# Patient Record
Sex: MALE | Race: WHITE | Employment: FULL TIME | URBAN - METROPOLITAN AREA
[De-identification: names, ages, dates, MRNs, and addresses within clinical notes are randomized per-mention and may not be internally consistent; named-entity substitution may affect disease eponyms.]

---

## 2017-01-03 ENCOUNTER — OFFICE VISIT (OUTPATIENT)
Dept: INTERNAL MEDICINE CLINIC | Age: 52
End: 2017-01-03

## 2017-01-03 VITALS
WEIGHT: 246 LBS | BODY MASS INDEX: 29.05 KG/M2 | HEIGHT: 77 IN | SYSTOLIC BLOOD PRESSURE: 130 MMHG | HEART RATE: 125 BPM | DIASTOLIC BLOOD PRESSURE: 90 MMHG | OXYGEN SATURATION: 97 %

## 2017-01-03 DIAGNOSIS — R07.9 CHEST PAIN, UNSPECIFIED TYPE: Primary | ICD-10-CM

## 2017-01-03 DIAGNOSIS — F51.01 PRIMARY INSOMNIA: ICD-10-CM

## 2017-01-03 PROCEDURE — 99213 OFFICE O/P EST LOW 20 MIN: CPT | Performed by: INTERNAL MEDICINE

## 2017-01-03 ASSESSMENT — ENCOUNTER SYMPTOMS
SHORTNESS OF BREATH: 1
COUGH: 1

## 2017-01-13 ENCOUNTER — OFFICE VISIT (OUTPATIENT)
Dept: INTERNAL MEDICINE CLINIC | Age: 52
End: 2017-01-13

## 2017-01-13 VITALS
BODY MASS INDEX: 28.69 KG/M2 | HEIGHT: 77 IN | OXYGEN SATURATION: 98 % | DIASTOLIC BLOOD PRESSURE: 80 MMHG | SYSTOLIC BLOOD PRESSURE: 120 MMHG | WEIGHT: 243 LBS | HEART RATE: 92 BPM

## 2017-01-13 DIAGNOSIS — I26.90 ACUTE SEPTIC PULMONARY EMBOLISM WITHOUT ACUTE COR PULMONALE (HCC): Primary | ICD-10-CM

## 2017-01-13 DIAGNOSIS — Z12.5 SCREENING PSA (PROSTATE SPECIFIC ANTIGEN): ICD-10-CM

## 2017-01-13 DIAGNOSIS — J18.9 PNEUMONIA DUE TO INFECTIOUS ORGANISM, UNSPECIFIED LATERALITY, UNSPECIFIED PART OF LUNG: ICD-10-CM

## 2017-01-13 DIAGNOSIS — F51.01 PRIMARY INSOMNIA: ICD-10-CM

## 2017-01-13 DIAGNOSIS — R91.1 PULMONARY NODULE: ICD-10-CM

## 2017-01-13 LAB
BASOPHILS ABSOLUTE: 0.1 K/UL (ref 0–0.2)
BASOPHILS RELATIVE PERCENT: 0.8 %
EOSINOPHILS ABSOLUTE: 0.5 K/UL (ref 0–0.6)
EOSINOPHILS RELATIVE PERCENT: 4.3 %
HCT VFR BLD CALC: 45.3 % (ref 40.5–52.5)
HEMOGLOBIN: 15.4 G/DL (ref 13.5–17.5)
LYMPHOCYTES ABSOLUTE: 2.1 K/UL (ref 1–5.1)
LYMPHOCYTES RELATIVE PERCENT: 19.6 %
MCH RBC QN AUTO: 29.6 PG (ref 26–34)
MCHC RBC AUTO-ENTMCNC: 34 G/DL (ref 31–36)
MCV RBC AUTO: 87.1 FL (ref 80–100)
MONOCYTES ABSOLUTE: 0.5 K/UL (ref 0–1.3)
MONOCYTES RELATIVE PERCENT: 4.8 %
NEUTROPHILS ABSOLUTE: 7.7 K/UL (ref 1.7–7.7)
NEUTROPHILS RELATIVE PERCENT: 70.5 %
PDW BLD-RTO: 12.6 % (ref 12.4–15.4)
PLATELET # BLD: 403 K/UL (ref 135–450)
PMV BLD AUTO: 7.9 FL (ref 5–10.5)
PROSTATE SPECIFIC ANTIGEN: 0.46 NG/ML (ref 0–4)
RBC # BLD: 5.2 M/UL (ref 4.2–5.9)
WBC # BLD: 10.9 K/UL (ref 4–11)

## 2017-01-13 PROCEDURE — 99214 OFFICE O/P EST MOD 30 MIN: CPT | Performed by: INTERNAL MEDICINE

## 2017-01-13 ASSESSMENT — ENCOUNTER SYMPTOMS
ALLERGIC/IMMUNOLOGIC NEGATIVE: 1
RESPIRATORY NEGATIVE: 1
EYES NEGATIVE: 1

## 2017-01-16 ENCOUNTER — TELEPHONE (OUTPATIENT)
Dept: INTERNAL MEDICINE CLINIC | Age: 52
End: 2017-01-16

## 2017-01-16 DIAGNOSIS — I26.90 ACUTE SEPTIC PULMONARY EMBOLISM WITHOUT ACUTE COR PULMONALE (HCC): Primary | ICD-10-CM

## 2017-01-30 ENCOUNTER — TELEPHONE (OUTPATIENT)
Dept: INTERNAL MEDICINE CLINIC | Age: 52
End: 2017-01-30

## 2017-01-30 DIAGNOSIS — I26.90 ACUTE SEPTIC PULMONARY EMBOLISM WITHOUT ACUTE COR PULMONALE (HCC): Primary | ICD-10-CM

## 2017-01-30 DIAGNOSIS — R93.89 ABNORMAL CXR (CHEST X-RAY): Primary | ICD-10-CM

## 2017-01-31 ENCOUNTER — TELEPHONE (OUTPATIENT)
Dept: PULMONOLOGY | Age: 52
End: 2017-01-31

## 2017-02-07 ENCOUNTER — TELEPHONE (OUTPATIENT)
Dept: INTERNAL MEDICINE CLINIC | Age: 52
End: 2017-02-07

## 2017-02-10 ENCOUNTER — TELEPHONE (OUTPATIENT)
Dept: INTERNAL MEDICINE CLINIC | Age: 52
End: 2017-02-10

## 2017-02-14 ENCOUNTER — OFFICE VISIT (OUTPATIENT)
Dept: PULMONOLOGY | Age: 52
End: 2017-02-14

## 2017-02-14 VITALS
BODY MASS INDEX: 28.64 KG/M2 | HEIGHT: 77 IN | RESPIRATION RATE: 16 BRPM | SYSTOLIC BLOOD PRESSURE: 110 MMHG | DIASTOLIC BLOOD PRESSURE: 84 MMHG | OXYGEN SATURATION: 97 % | TEMPERATURE: 98.6 F | HEART RATE: 109 BPM | WEIGHT: 242.6 LBS

## 2017-02-14 DIAGNOSIS — I26.99 OTHER ACUTE PULMONARY EMBOLISM WITHOUT ACUTE COR PULMONALE (HCC): ICD-10-CM

## 2017-02-14 DIAGNOSIS — R93.89 ABNORMAL CHEST CT: Primary | ICD-10-CM

## 2017-02-14 PROCEDURE — 99205 OFFICE O/P NEW HI 60 MIN: CPT | Performed by: INTERNAL MEDICINE

## 2017-03-15 ENCOUNTER — HOSPITAL ENCOUNTER (OUTPATIENT)
Dept: NON INVASIVE DIAGNOSTICS | Age: 52
Discharge: HOME | End: 2017-03-15
Attending: INTERNAL MEDICINE | Admitting: INTERNAL MEDICINE

## 2017-03-15 DIAGNOSIS — I26.99 OTHER PULMONARY EMBOLISM WITHOUT ACUTE COR PULMONALE (HCC): ICD-10-CM

## 2017-03-15 LAB
LV EF: 58 %
LVEF MODALITY: NORMAL

## 2017-03-16 ENCOUNTER — TELEPHONE (OUTPATIENT)
Dept: PULMONOLOGY | Age: 52
End: 2017-03-16

## 2017-03-16 DIAGNOSIS — R93.89 ABNORMAL CHEST CT: Primary | ICD-10-CM

## 2017-05-08 ENCOUNTER — HOSPITAL ENCOUNTER (OUTPATIENT)
Dept: CT IMAGING | Age: 52
Discharge: HOME | End: 2017-05-08
Attending: INTERNAL MEDICINE | Admitting: INTERNAL MEDICINE

## 2017-05-08 DIAGNOSIS — R93.89 ABNORMAL CHEST CT: ICD-10-CM

## 2017-05-08 DIAGNOSIS — R93.89 ABNORMAL FINDINGS ON DIAGNOSTIC IMAGING OF OTHER SPECIFIED BODY STRUCTURES: ICD-10-CM

## 2017-05-16 ENCOUNTER — TELEPHONE (OUTPATIENT)
Dept: PULMONOLOGY | Age: 52
End: 2017-05-16

## 2017-05-17 ENCOUNTER — OFFICE VISIT (OUTPATIENT)
Dept: PULMONOLOGY | Age: 52
End: 2017-05-17

## 2017-05-17 VITALS
DIASTOLIC BLOOD PRESSURE: 82 MMHG | RESPIRATION RATE: 16 BRPM | BODY MASS INDEX: 29.28 KG/M2 | HEIGHT: 77 IN | OXYGEN SATURATION: 96 % | TEMPERATURE: 98 F | HEART RATE: 70 BPM | SYSTOLIC BLOOD PRESSURE: 132 MMHG | WEIGHT: 248 LBS

## 2017-05-17 DIAGNOSIS — R93.89 ABNORMAL CHEST CT: Primary | ICD-10-CM

## 2017-05-17 PROCEDURE — 99213 OFFICE O/P EST LOW 20 MIN: CPT | Performed by: INTERNAL MEDICINE

## 2017-12-18 ENCOUNTER — HOSPITAL ENCOUNTER (OUTPATIENT)
Dept: CT IMAGING | Age: 52
Discharge: HOME | End: 2017-12-18
Attending: INTERNAL MEDICINE | Admitting: INTERNAL MEDICINE

## 2017-12-18 DIAGNOSIS — R93.89 ABNORMAL FINDINGS ON DIAGNOSTIC IMAGING OF OTHER SPECIFIED BODY STRUCTURES: ICD-10-CM

## 2017-12-18 DIAGNOSIS — R93.89 ABNORMAL CHEST CT: ICD-10-CM

## 2017-12-20 ENCOUNTER — OFFICE VISIT (OUTPATIENT)
Dept: PULMONOLOGY | Age: 52
End: 2017-12-20

## 2017-12-20 VITALS
RESPIRATION RATE: 16 BRPM | SYSTOLIC BLOOD PRESSURE: 122 MMHG | BODY MASS INDEX: 29.16 KG/M2 | DIASTOLIC BLOOD PRESSURE: 78 MMHG | HEIGHT: 77 IN | HEART RATE: 98 BPM | WEIGHT: 247 LBS | OXYGEN SATURATION: 97 %

## 2017-12-20 DIAGNOSIS — R93.89 ABNORMAL CHEST CT: Primary | ICD-10-CM

## 2017-12-20 PROCEDURE — 3017F COLORECTAL CA SCREEN DOC REV: CPT | Performed by: INTERNAL MEDICINE

## 2017-12-20 PROCEDURE — 1036F TOBACCO NON-USER: CPT | Performed by: INTERNAL MEDICINE

## 2017-12-20 PROCEDURE — G8482 FLU IMMUNIZE ORDER/ADMIN: HCPCS | Performed by: INTERNAL MEDICINE

## 2017-12-20 PROCEDURE — G8419 CALC BMI OUT NRM PARAM NOF/U: HCPCS | Performed by: INTERNAL MEDICINE

## 2017-12-20 PROCEDURE — 99212 OFFICE O/P EST SF 10 MIN: CPT | Performed by: INTERNAL MEDICINE

## 2017-12-20 PROCEDURE — G8427 DOCREV CUR MEDS BY ELIG CLIN: HCPCS | Performed by: INTERNAL MEDICINE

## 2017-12-20 NOTE — PROGRESS NOTES
Chief complaint  This is a 47y.o. year old male  who presents with a chief complaint of   Chief Complaint   Patient presents with    Results     ct chest       HPI  58-year-old man presents for follow-up of abnormal chest CT. He has no complaints. He denies cough or shortness of breath. He continues to play volleyball. Past history:  He was diagnosed with bilateral pulmonary emboli in January 2017. He was admitted to [facility] for 2 days. Chest CT at that time showed bilateral areas of consolidation. Past Medical History:   Diagnosis Date    Hemochromatosis     Insomnia     Pulmonary emboli (HCC)        Past Surgical History:   Procedure Laterality Date    APPENDECTOMY      COLONOSCOPY  6/1/2015    dr Jag Cevallos       Current Outpatient Prescriptions   Medication Sig Dispense Refill    amitriptyline (ELAVIL) 10 MG tablet TAKE TWO TABLETS BY MOUTH ONCE NIGHTLY 60 tablet 1    apixaban (ELIQUIS) 5 MG TABS tablet Take 1 tablet by mouth 2 times daily 60 tablet 4    Multiple Vitamin (MULTI VITAMIN PO) Take by mouth nightly       No current facility-administered medications for this visit. Allergies   Allergen Reactions    Codeine        Family History   Problem Relation Age of Onset    Asthma Mother     High Blood Pressure Father     Bleeding Prob Father     Anxiety Disorder Other     Learning Disabilities Other      Non Verbal   Father had blood clots in his 35s and 62s    Social History     Social History    Marital status:      Spouse name: N/A    Number of children: N/A    Years of education: N/A     Occupational History    Not on file.      Social History Main Topics    Smoking status: Never Smoker    Smokeless tobacco: Never Used    Alcohol use Yes      Comment: socially    Drug use: No    Sexual activity: Yes     Partners: Female     Other Topics Concern    Not on file     Social History Narrative    No narrative on file       Immunization History Administered Date(s) Administered    Influenza Virus Vaccine 11/01/2017    Td 10/01/2003    Tdap (Boostrix, Adacel) 01/09/2015       ROS:  GENERAL:  No fevers, no chills, no weight loss  RESPIRATORY:  No shortness of breath, no wheezing, no cough      PHYSICAL EXAM:  Vitals:    12/20/17 1402   BP: 122/78   Pulse: 98   Resp: 16   SpO2: 97%   on RA    Gen: Well developed; well nourished  Eyes: No scleral icterus. No conjunctival injection. ENT:  Oropharynx clear. External appearance of ears and nose normal.  Neck: Trachea midline. No obvious mass. No visible thyroid enlargement    Respiratory: Clear to auscultation bilaterally, no accessory muscle use  Cardiovascular: Regular rate and rhythm, no appreciable murmurs. No edema. Gastrointestinal: Soft, non-tender. No hernia  Skin: Warm and dry. No rashes or ulcers on visible areas. Normal texture and turgor  Lymphatic: No cervical LAD. No supraclavicular LAD. Musculoskeletal: No cyanosis, clubbing or joint deformity. Psychiatric: Normal mood and affect; exhibits normal insight and judgement     Images and reports of chest imaging were reviewed by me. My interpretation is:  CXR (10/2/15): Clear lung fields  Chest CT (2/9/17- CD from [place] [id number]): No LAD; bi-apical scarring; atelectasis at the right base; consolidation at the left base; atelectasis in the lingula and left lower lobe  Chest CT (5/8/17): ; Bi-apical scarring; atelectasis at the right base; atelectasis in the lingula; left base nodular density (compared to the studies in January and February 2017 there has been progressive improvement in bilateral lower lobe and lingular consolidation)  Chest CT (12/18/17):  No LAD; bi-apical scarring; bibasilar scarring; atelectasis in the lingula (studies have shown progressive improvement in lower lobe areas of consolidation)     ECHO (3/15/17)  Summary   Left ventricle size is normal.   Normal left ventricular wall thickness.   Ejection fraction is visually estimated to be 55-60 %.   Trivial mitral regurgitation is present.   Trivial aortic regurgitation is present.  Eugenia Wright is trivial tricuspid regurgitation with RVSP estimated at 26 mmHg. Lab Results   Component Value Date    WBC 9.6 01/23/2017    HGB 15.9 01/23/2017    HCT 48.6 01/23/2017    MCV 92.2 01/23/2017     01/23/2017       No results found for: BNP    Lab Results   Component Value Date    CREATININE 1.2 01/23/2017    BUN 16 01/23/2017     01/23/2017    K 4.7 01/23/2017     01/23/2017    CO2 30 01/23/2017         Assessment/Plan:47y.o. year old male presents for follow up. Abnormal chest CT- Chest CT scans have shown progressive improvement in the areas of consolidation in the lower lobes. He now has small areas of scarring at the bases. No further follow-up is needed for this. Pulmonary emboli- Currently on Eliquis and follows with hematology. He tells me he will complete his one year of anticoagulation in a few weeks. Discussed right eighth rib abnormality with patient. Advised that he discuss this with his primary physician. He may return for follow-up as needed.       Estephanie Cruz MD  [facility] Pulmonology, Critical Care and Sleep

## 2017-12-29 ENCOUNTER — OFFICE VISIT (OUTPATIENT)
Dept: INTERNAL MEDICINE CLINIC | Age: 52
End: 2017-12-29

## 2017-12-29 VITALS
SYSTOLIC BLOOD PRESSURE: 136 MMHG | TEMPERATURE: 97.9 F | BODY MASS INDEX: 29.99 KG/M2 | HEIGHT: 77 IN | RESPIRATION RATE: 16 BRPM | HEART RATE: 82 BPM | DIASTOLIC BLOOD PRESSURE: 88 MMHG | OXYGEN SATURATION: 99 % | WEIGHT: 254 LBS

## 2017-12-29 DIAGNOSIS — Z12.5 SCREENING PSA (PROSTATE SPECIFIC ANTIGEN): ICD-10-CM

## 2017-12-29 DIAGNOSIS — I26.90 ACUTE SEPTIC PULMONARY EMBOLISM WITHOUT ACUTE COR PULMONALE (HCC): Primary | ICD-10-CM

## 2017-12-29 DIAGNOSIS — M89.9 BONE LESION: ICD-10-CM

## 2017-12-29 DIAGNOSIS — Z13.220 SCREENING, LIPID: ICD-10-CM

## 2017-12-29 LAB
A/G RATIO: 2.1 (ref 1.1–2.2)
ALBUMIN SERPL-MCNC: 4.7 G/DL (ref 3.4–5)
ALP BLD-CCNC: 48 U/L (ref 40–129)
ALT SERPL-CCNC: 27 U/L (ref 10–40)
ANION GAP SERPL CALCULATED.3IONS-SCNC: 14 MMOL/L (ref 3–16)
AST SERPL-CCNC: 21 U/L (ref 15–37)
BILIRUB SERPL-MCNC: 0.9 MG/DL (ref 0–1)
BUN BLDV-MCNC: 19 MG/DL (ref 7–20)
CALCIUM SERPL-MCNC: 9.6 MG/DL (ref 8.3–10.6)
CHLORIDE BLD-SCNC: 102 MMOL/L (ref 99–110)
CHOLESTEROL, TOTAL: 137 MG/DL (ref 0–199)
CO2: 26 MMOL/L (ref 21–32)
CREAT SERPL-MCNC: 1.1 MG/DL (ref 0.9–1.3)
GFR AFRICAN AMERICAN: >60
GFR NON-AFRICAN AMERICAN: >60
GLOBULIN: 2.2 G/DL
GLUCOSE BLD-MCNC: 97 MG/DL (ref 70–99)
HDLC SERPL-MCNC: 59 MG/DL (ref 40–60)
HEPATITIS C ANTIBODY INTERPRETATION: NORMAL
LDL CHOLESTEROL CALCULATED: 62 MG/DL
POTASSIUM SERPL-SCNC: 4.1 MMOL/L (ref 3.5–5.1)
PROSTATE SPECIFIC ANTIGEN: 0.34 NG/ML (ref 0–4)
PROTEIN PROTEIN: 0.04 G/DL
PROTEIN PROTEIN: 40 MG/DL
SEDIMENTATION RATE, ERYTHROCYTE: 7 MM/HR (ref 0–20)
SODIUM BLD-SCNC: 142 MMOL/L (ref 136–145)
TOTAL PROTEIN: 6.9 G/DL (ref 6.4–8.2)
TRIGL SERPL-MCNC: 80 MG/DL (ref 0–150)
VLDLC SERPL CALC-MCNC: 16 MG/DL

## 2017-12-29 PROCEDURE — G8482 FLU IMMUNIZE ORDER/ADMIN: HCPCS | Performed by: INTERNAL MEDICINE

## 2017-12-29 PROCEDURE — 3017F COLORECTAL CA SCREEN DOC REV: CPT | Performed by: INTERNAL MEDICINE

## 2017-12-29 PROCEDURE — G8417 CALC BMI ABV UP PARAM F/U: HCPCS | Performed by: INTERNAL MEDICINE

## 2017-12-29 PROCEDURE — 99213 OFFICE O/P EST LOW 20 MIN: CPT | Performed by: INTERNAL MEDICINE

## 2017-12-29 PROCEDURE — 1036F TOBACCO NON-USER: CPT | Performed by: INTERNAL MEDICINE

## 2017-12-29 PROCEDURE — G8427 DOCREV CUR MEDS BY ELIG CLIN: HCPCS | Performed by: INTERNAL MEDICINE

## 2017-12-29 ASSESSMENT — ENCOUNTER SYMPTOMS
EYES NEGATIVE: 1
GASTROINTESTINAL NEGATIVE: 1
RESPIRATORY NEGATIVE: 1

## 2017-12-29 NOTE — PROGRESS NOTES
Subjective:      Patient ID: Carolina Palm is a 47 y.o. male. HPI  Was due last year    Stopped eliquis    No longer seeing  Dr Trevor Zimmerman       No pain      He does not want to pursue    Wants a lot of screen          Review of Systems   Constitutional: Negative. Eyes: Negative. Respiratory: Negative. Cardiovascular: Negative. Gastrointestinal: Negative. Endocrine: Negative. Genitourinary: Negative. Musculoskeletal: Negative. Neurological: Negative. Objective:   Physical Exam   Constitutional: He appears well-developed and well-nourished. HENT:   Head: Normocephalic and atraumatic. Eyes: Conjunctivae and EOM are normal.   Cardiovascular: Normal rate, regular rhythm and normal heart sounds. Pulmonary/Chest: Breath sounds normal. No respiratory distress. He has no wheezes. He has no rales. Musculoskeletal: He exhibits no edema. Vitals reviewed. Assessment:      Janae Freedman was seen today for lesion(s). Diagnoses and all orders for this visit:    Acute septic pulmonary embolism without acute cor pulmonale (HCC)    DONE WITH  Ohc/pULM  Off eliquis   Screening PSA (prostate specific antigen)    NO S/S  Will do screen  Bone lesion    AS ABOVE SEE PHILIPPE  Refuses  Bone scan will see  Ortho first  Screening, lipid    DO LABS   psa and hep c  Has had hiv done        Plan: Yuly Calderón

## 2018-01-01 LAB
ALBUMIN SERPL-MCNC: 4 G/DL (ref 3.1–4.9)
ALPHA-1-GLOBULIN: 0.2 G/DL (ref 0.2–0.4)
ALPHA-2-GLOBULIN: 0.6 G/DL (ref 0.4–1.1)
BETA GLOBULIN: 1.1 G/DL (ref 0.9–1.6)
GAMMA GLOBULIN: 1 G/DL (ref 0.6–1.8)

## 2018-01-02 ENCOUNTER — TELEPHONE (OUTPATIENT)
Dept: INTERNAL MEDICINE CLINIC | Age: 53
End: 2018-01-02

## 2018-01-02 LAB
SPE/IFE INTERPRETATION: NORMAL
URINE ELECTROPHORESIS INTERP: NORMAL

## 2018-01-05 ENCOUNTER — TELEPHONE (OUTPATIENT)
Dept: INTERNAL MEDICINE CLINIC | Age: 53
End: 2018-01-05

## 2018-01-05 DIAGNOSIS — M89.9 BONE LESION: Primary | ICD-10-CM

## 2018-06-11 DIAGNOSIS — F51.01 PRIMARY INSOMNIA: ICD-10-CM

## 2018-06-11 RX ORDER — AMITRIPTYLINE HYDROCHLORIDE 10 MG/1
TABLET, FILM COATED ORAL
Qty: 60 TABLET | Refills: 2 | Status: SHIPPED | OUTPATIENT
Start: 2018-06-11 | End: 2018-09-09 | Stop reason: SDUPTHER

## 2018-07-06 ENCOUNTER — HOSPITAL ENCOUNTER (OUTPATIENT)
Dept: VASCULAR LAB | Age: 53
Discharge: HOME | End: 2018-07-06
Attending: INTERNAL MEDICINE | Admitting: INTERNAL MEDICINE

## 2018-07-06 ENCOUNTER — OFFICE VISIT (OUTPATIENT)
Dept: INTERNAL MEDICINE CLINIC | Age: 53
End: 2018-07-06

## 2018-07-06 VITALS
SYSTOLIC BLOOD PRESSURE: 130 MMHG | BODY MASS INDEX: 29.64 KG/M2 | WEIGHT: 251 LBS | HEIGHT: 77 IN | HEART RATE: 98 BPM | DIASTOLIC BLOOD PRESSURE: 70 MMHG | RESPIRATION RATE: 16 BRPM

## 2018-07-06 DIAGNOSIS — M79.605 LEFT LEG PAIN: Primary | ICD-10-CM

## 2018-07-06 DIAGNOSIS — I82.4Z2 ACUTE DEEP VEIN THROMBOSIS (DVT) OF DISTAL VEIN OF LEFT LOWER EXTREMITY (HCC): ICD-10-CM

## 2018-07-06 DIAGNOSIS — M79.605 PAIN OF LEFT LEG: ICD-10-CM

## 2018-07-06 LAB
ANION GAP SERPL CALCULATED.3IONS-SCNC: 13 MMOL/L (ref 3–16)
BASOPHILS ABSOLUTE: 0.1 K/UL (ref 0–0.2)
BASOPHILS RELATIVE PERCENT: 0.5 %
BUN BLDV-MCNC: 15 MG/DL (ref 7–20)
CALCIUM SERPL-MCNC: 9.8 MG/DL (ref 8.3–10.6)
CHLORIDE BLD-SCNC: 103 MMOL/L (ref 99–110)
CO2: 26 MMOL/L (ref 21–32)
CREAT SERPL-MCNC: 1.2 MG/DL (ref 0.9–1.3)
EOSINOPHILS ABSOLUTE: 0.4 K/UL (ref 0–0.6)
EOSINOPHILS RELATIVE PERCENT: 4.3 %
GFR AFRICAN AMERICAN: >60
GFR NON-AFRICAN AMERICAN: >60
GLUCOSE BLD-MCNC: 105 MG/DL (ref 70–99)
HCT VFR BLD CALC: 46.2 % (ref 40.5–52.5)
HEMOGLOBIN: 16.4 G/DL (ref 13.5–17.5)
LYMPHOCYTES ABSOLUTE: 1.9 K/UL (ref 1–5.1)
LYMPHOCYTES RELATIVE PERCENT: 19.5 %
MCH RBC QN AUTO: 32.1 PG (ref 26–34)
MCHC RBC AUTO-ENTMCNC: 35.6 G/DL (ref 31–36)
MCV RBC AUTO: 90.2 FL (ref 80–100)
MONOCYTES ABSOLUTE: 0.4 K/UL (ref 0–1.3)
MONOCYTES RELATIVE PERCENT: 4.2 %
NEUTROPHILS ABSOLUTE: 7 K/UL (ref 1.7–7.7)
NEUTROPHILS RELATIVE PERCENT: 71.5 %
PDW BLD-RTO: 13.4 % (ref 12.4–15.4)
PLATELET # BLD: 224 K/UL (ref 135–450)
PMV BLD AUTO: 8.8 FL (ref 5–10.5)
POTASSIUM SERPL-SCNC: 4.5 MMOL/L (ref 3.5–5.1)
RBC # BLD: 5.12 M/UL (ref 4.2–5.9)
SODIUM BLD-SCNC: 142 MMOL/L (ref 136–145)
WBC # BLD: 9.9 K/UL (ref 4–11)

## 2018-07-06 PROCEDURE — 99213 OFFICE O/P EST LOW 20 MIN: CPT | Performed by: INTERNAL MEDICINE

## 2018-07-06 PROCEDURE — G8417 CALC BMI ABV UP PARAM F/U: HCPCS | Performed by: INTERNAL MEDICINE

## 2018-07-06 PROCEDURE — 3017F COLORECTAL CA SCREEN DOC REV: CPT | Performed by: INTERNAL MEDICINE

## 2018-07-06 PROCEDURE — G8427 DOCREV CUR MEDS BY ELIG CLIN: HCPCS | Performed by: INTERNAL MEDICINE

## 2018-07-06 PROCEDURE — 1036F TOBACCO NON-USER: CPT | Performed by: INTERNAL MEDICINE

## 2018-07-06 RX ORDER — NAPROXEN 500 MG/1
500 TABLET ORAL 2 TIMES DAILY WITH MEALS
Qty: 30 TABLET | Refills: 1 | Status: SHIPPED | OUTPATIENT
Start: 2018-07-06 | End: 2018-07-06

## 2018-07-06 ASSESSMENT — PATIENT HEALTH QUESTIONNAIRE - PHQ9
SUM OF ALL RESPONSES TO PHQ QUESTIONS 1-9: 0
2. FEELING DOWN, DEPRESSED OR HOPELESS: 0
1. LITTLE INTEREST OR PLEASURE IN DOING THINGS: 0
SUM OF ALL RESPONSES TO PHQ9 QUESTIONS 1 & 2: 0

## 2018-07-06 NOTE — PROGRESS NOTES
Subjective:      Patient ID: Laurie Martinez is a 46 y.o. male. HPI  Here for evaluation of left leg pain. This has been ongoing over the last 2-3 days. Pain is better with movement. Dull aches, better in the day. Denies numbness and tingling. 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 The leg feels normal strength    Remote history of a PE, 2-3 years ago. Current Outpatient Prescriptions on File Prior to Visit   Medication Sig Dispense Refill    amitriptyline (ELAVIL) 10 MG tablet TAKE TWO TABLETS BY MOUTH ONCE NIGHTLY 60 tablet 2    Multiple Vitamin (MULTI VITAMIN PO) Take by mouth nightly       No current facility-administered medications on file prior to visit. Review of Systems    Objective:   Physical Exam  Normal leg strength in both legs, although may be subtle hamstring weakness on the left compared to the right. Bilateral patellar reflexes 2+. The right Achilles reflexes 1-2+, and absent on the left side. There is no palpable cord in the left calf although mild tenderness with palpation. Assessment:      Left leg pain-  Naproxen 500 mg twice daily for 10 days. This may represent a lumbar radiculopathy given the loss of the left Achilles reflex. Additionally, given his history of DVT/PE, we'll obtain left leg venous Doppler today. Plan:      As above, follow-up next week with Dr. Arnaldo Schaffer as previously scheduled. Addendum:  The lower extremity Doppler showed DVT in the calf. The official report is still pending, but given the fact that this is a recurrent event without any risk factors for acute DVT, we'll place the patient back on Eliquis on full anticoagulation dose. 10 mg twice daily for 7 days then 5 mg twice a day. Then he will follow-up with Dr. Liza Almanzar.

## 2018-07-10 ENCOUNTER — OFFICE VISIT (OUTPATIENT)
Dept: INTERNAL MEDICINE CLINIC | Age: 53
End: 2018-07-10

## 2018-07-10 VITALS
RESPIRATION RATE: 12 BRPM | HEART RATE: 78 BPM | DIASTOLIC BLOOD PRESSURE: 84 MMHG | BODY MASS INDEX: 29.52 KG/M2 | SYSTOLIC BLOOD PRESSURE: 134 MMHG | OXYGEN SATURATION: 97 % | WEIGHT: 250 LBS | HEIGHT: 77 IN

## 2018-07-10 DIAGNOSIS — F51.01 PRIMARY INSOMNIA: ICD-10-CM

## 2018-07-10 DIAGNOSIS — I82.4Z2 ACUTE DEEP VEIN THROMBOSIS (DVT) OF DISTAL VEIN OF LEFT LOWER EXTREMITY (HCC): Primary | ICD-10-CM

## 2018-07-10 DIAGNOSIS — R91.1 PULMONARY NODULE: ICD-10-CM

## 2018-07-10 PROCEDURE — 3017F COLORECTAL CA SCREEN DOC REV: CPT | Performed by: INTERNAL MEDICINE

## 2018-07-10 PROCEDURE — 1036F TOBACCO NON-USER: CPT | Performed by: INTERNAL MEDICINE

## 2018-07-10 PROCEDURE — G8427 DOCREV CUR MEDS BY ELIG CLIN: HCPCS | Performed by: INTERNAL MEDICINE

## 2018-07-10 PROCEDURE — G8417 CALC BMI ABV UP PARAM F/U: HCPCS | Performed by: INTERNAL MEDICINE

## 2018-07-10 PROCEDURE — 99213 OFFICE O/P EST LOW 20 MIN: CPT | Performed by: INTERNAL MEDICINE

## 2018-07-10 ASSESSMENT — ENCOUNTER SYMPTOMS
EYES NEGATIVE: 1
ABDOMINAL DISTENTION: 0
SHORTNESS OF BREATH: 0
EYE REDNESS: 0
DIARRHEA: 0
BACK PAIN: 0
RESPIRATORY NEGATIVE: 1
CHEST TIGHTNESS: 0
WHEEZING: 0
SINUS PAIN: 0
COUGH: 0
ABDOMINAL PAIN: 0
VOMITING: 0
SORE THROAT: 0

## 2018-07-10 NOTE — PROGRESS NOTES
distal vein of left lower extremity ([address]    To see leudarryler  Protein C  Primary insomnia    stable  Pulmonary nodule     See simran and check with schuyler  Re rib spot           Plan: Yeimy Disla

## 2018-09-09 DIAGNOSIS — F51.01 PRIMARY INSOMNIA: ICD-10-CM

## 2018-09-09 RX ORDER — AMITRIPTYLINE HYDROCHLORIDE 10 MG/1
TABLET, FILM COATED ORAL
Qty: 60 TABLET | Refills: 1 | Status: SHIPPED | OUTPATIENT
Start: 2018-09-09 | End: 2018-11-11 | Stop reason: SDUPTHER

## 2018-11-26 RX ORDER — APIXABAN 5 MG/1
TABLET, FILM COATED ORAL
Qty: 60 TABLET | Refills: 2 | Status: SHIPPED | OUTPATIENT
Start: 2018-11-26 | End: 2019-02-23 | Stop reason: SDUPTHER

## 2018-12-19 ENCOUNTER — HOSPITAL ENCOUNTER (OUTPATIENT)
Dept: CT IMAGING | Age: 53
Discharge: HOME | End: 2018-12-19
Payer: COMMERCIAL

## 2018-12-19 DIAGNOSIS — Z79.01 LONG TERM (CURRENT) USE OF ANTICOAGULANTS: ICD-10-CM

## 2018-12-19 DIAGNOSIS — J84.112 ACUTE IDIOPATHIC PULMONARY FIBROSIS: ICD-10-CM

## 2018-12-19 PROCEDURE — 6360000004 HC RX CONTRAST MEDICATION: Performed by: INTERNAL MEDICINE

## 2018-12-19 PROCEDURE — 71260 CT THORAX DX C+: CPT

## 2018-12-19 RX ADMIN — IOVERSOL 75 ML: 678 INJECTION INTRA-ARTERIAL; INTRAVENOUS at 07:16

## 2019-12-03 ENCOUNTER — HOSPITAL ENCOUNTER (OUTPATIENT)
Dept: CT IMAGING | Age: 54
Discharge: HOME | End: 2019-12-03
Payer: COMMERCIAL

## 2019-12-03 DIAGNOSIS — M89.9 RIB LESION: ICD-10-CM

## 2019-12-03 DIAGNOSIS — R91.1 LESION OF LUNG: ICD-10-CM

## 2019-12-03 PROCEDURE — 71260 CT THORAX DX C+: CPT

## 2019-12-03 PROCEDURE — 6360000004 HC RX CONTRAST MEDICATION: Performed by: INTERNAL MEDICINE

## 2019-12-03 RX ADMIN — IOPAMIDOL 75 ML: 755 INJECTION, SOLUTION INTRAVENOUS at 07:58

## 2020-08-17 RX ORDER — AMITRIPTYLINE HYDROCHLORIDE 10 MG/1
TABLET, FILM COATED ORAL
Qty: 180 TABLET | Refills: 0 | Status: SHIPPED | OUTPATIENT
Start: 2020-08-17 | End: 2020-11-16

## 2020-12-04 ENCOUNTER — HOSPITAL ENCOUNTER (OUTPATIENT)
Dept: CT IMAGING | Age: 55
Discharge: HOME | End: 2020-12-04
Payer: COMMERCIAL

## 2020-12-04 PROCEDURE — 71250 CT THORAX DX C-: CPT

## 2021-02-04 DIAGNOSIS — R74.01 ELEVATED ALT MEASUREMENT: ICD-10-CM

## 2021-02-04 LAB
ALBUMIN SERPL-MCNC: 4.8 G/DL (ref 3.4–5)
ALP BLD-CCNC: 51 U/L (ref 40–129)
ALT SERPL-CCNC: 47 U/L (ref 10–40)
AST SERPL-CCNC: 25 U/L (ref 15–37)
BILIRUB SERPL-MCNC: 0.7 MG/DL (ref 0–1)
BILIRUBIN DIRECT: <0.2 MG/DL (ref 0–0.3)
BILIRUBIN, INDIRECT: ABNORMAL MG/DL (ref 0–1)
TOTAL PROTEIN: 7.8 G/DL (ref 6.4–8.2)

## 2021-05-16 DIAGNOSIS — F51.01 PRIMARY INSOMNIA: ICD-10-CM

## 2021-05-17 RX ORDER — AMITRIPTYLINE HYDROCHLORIDE 10 MG/1
TABLET, FILM COATED ORAL
Qty: 180 TABLET | Refills: 0 | Status: SHIPPED | OUTPATIENT
Start: 2021-05-17 | End: 2021-08-09

## 2021-12-13 ENCOUNTER — HOSPITAL ENCOUNTER (OUTPATIENT)
Dept: CT IMAGING | Age: 56
Discharge: HOME | End: 2021-12-13
Payer: COMMERCIAL

## 2021-12-13 DIAGNOSIS — R91.1 LESION OF LUNG: ICD-10-CM

## 2021-12-13 DIAGNOSIS — M89.9 RIB LESION: ICD-10-CM

## 2021-12-13 PROCEDURE — 71250 CT THORAX DX C-: CPT

## 2022-05-03 ENCOUNTER — APPOINTMENT (OUTPATIENT)
Dept: CT IMAGING | Age: 57
End: 2022-05-03
Payer: COMMERCIAL

## 2022-05-03 ENCOUNTER — APPOINTMENT (OUTPATIENT)
Dept: GENERAL RADIOLOGY | Age: 57
End: 2022-05-03
Payer: COMMERCIAL

## 2022-05-03 ENCOUNTER — HOSPITAL ENCOUNTER (EMERGENCY)
Age: 57
Discharge: HOME | End: 2022-05-03
Payer: COMMERCIAL

## 2022-05-03 VITALS
HEART RATE: 80 BPM | DIASTOLIC BLOOD PRESSURE: 80 MMHG | RESPIRATION RATE: 18 BRPM | SYSTOLIC BLOOD PRESSURE: 140 MMHG | WEIGHT: 251.32 LBS | OXYGEN SATURATION: 99 % | HEIGHT: 77 IN | TEMPERATURE: 98.5 F | BODY MASS INDEX: 29.68 KG/M2
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
## 2022-05-03 DIAGNOSIS — S42.201A CLOSED FRACTURE OF PROXIMAL END OF RIGHT HUMERUS, UNSPECIFIED FRACTURE MORPHOLOGY, INITIAL ENCOUNTER: Primary | ICD-10-CM

## 2022-05-03 PROCEDURE — 2500000003 HC RX 250 WO HCPCS: Performed by: GENERAL ACUTE CARE HOSPITAL

## 2022-05-03 PROCEDURE — 99284 EMERGENCY DEPT VISIT MOD MDM: CPT

## 2022-05-03 PROCEDURE — 96375 TX/PRO/DX INJ NEW DRUG ADDON: CPT

## 2022-05-03 PROCEDURE — 96374 THER/PROPH/DIAG INJ IV PUSH: CPT

## 2022-05-03 PROCEDURE — 73030 X-RAY EXAM OF SHOULDER: CPT

## 2022-05-03 PROCEDURE — 6360000002 HC RX W HCPCS: Performed by: GENERAL ACUTE CARE HOSPITAL

## 2022-05-03 PROCEDURE — 6370000000 HC RX 637 (ALT 250 FOR IP): Performed by: GENERAL ACUTE CARE HOSPITAL

## 2022-05-03 PROCEDURE — 72125 CT NECK SPINE W/O DYE: CPT

## 2022-05-03 PROCEDURE — 70450 CT HEAD/BRAIN W/O DYE: CPT

## 2022-05-03 RX ORDER — OXYCODONE AND ACETAMINOPHEN 7.5; 325 MG/1; MG/1
1 TABLET ORAL EVERY 6 HOURS PRN
Qty: 15 TABLET | Refills: 0 | Status: ON HOLD | OUTPATIENT
Start: 2022-05-03 | End: 2022-05-06 | Stop reason: HOSPADM

## 2022-05-03 RX ORDER — LIDOCAINE HYDROCHLORIDE 20 MG/ML
5 INJECTION, SOLUTION INFILTRATION; PERINEURAL ONCE
Status: COMPLETED | OUTPATIENT
Start: 2022-05-03 | End: 2022-05-03

## 2022-05-03 RX ORDER — ONDANSETRON 4 MG/1
4 TABLET, ORALLY DISINTEGRATING ORAL ONCE
Status: COMPLETED | OUTPATIENT
Start: 2022-05-03 | End: 2022-05-03

## 2022-05-03 RX ORDER — CYCLOBENZAPRINE HCL 10 MG
10 TABLET ORAL ONCE
Status: COMPLETED | OUTPATIENT
Start: 2022-05-03 | End: 2022-05-03

## 2022-05-03 RX ORDER — ONDANSETRON 4 MG/1
4 TABLET, ORALLY DISINTEGRATING ORAL EVERY 8 HOURS PRN
Qty: 20 TABLET | Refills: 0 | Status: SHIPPED | OUTPATIENT
Start: 2022-05-03 | End: 2022-08-02

## 2022-05-03 RX ORDER — ONDANSETRON 2 MG/ML
4 INJECTION INTRAMUSCULAR; INTRAVENOUS ONCE
Status: COMPLETED | OUTPATIENT
Start: 2022-05-03 | End: 2022-05-03

## 2022-05-03 RX ORDER — CYCLOBENZAPRINE HCL 10 MG
10 TABLET ORAL 3 TIMES DAILY PRN
Qty: 20 TABLET | Refills: 0 | Status: SHIPPED | OUTPATIENT
Start: 2022-05-03 | End: 2022-05-10

## 2022-05-03 RX ORDER — OXYCODONE HYDROCHLORIDE AND ACETAMINOPHEN 5; 325 MG/1; MG/1
1 TABLET ORAL EVERY 6 HOURS PRN
Qty: 15 TABLET | Refills: 0 | Status: SHIPPED | OUTPATIENT
Start: 2022-05-03 | End: 2022-05-03

## 2022-05-03 RX ORDER — BUPIVACAINE HYDROCHLORIDE 5 MG/ML
30 INJECTION, SOLUTION PERINEURAL ONCE
Status: COMPLETED | OUTPATIENT
Start: 2022-05-03 | End: 2022-05-03

## 2022-05-03 RX ORDER — OXYCODONE HYDROCHLORIDE AND ACETAMINOPHEN 5; 325 MG/1; MG/1
2 TABLET ORAL ONCE
Status: COMPLETED | OUTPATIENT
Start: 2022-05-03 | End: 2022-05-03

## 2022-05-03 RX ADMIN — ONDANSETRON 4 MG: 4 TABLET, ORALLY DISINTEGRATING ORAL at 22:09

## 2022-05-03 RX ADMIN — CYCLOBENZAPRINE HYDROCHLORIDE 10 MG: 10 TABLET, FILM COATED ORAL at 23:02

## 2022-05-03 RX ADMIN — OXYCODONE AND ACETAMINOPHEN 2 TABLET: 5; 325 TABLET ORAL at 22:09

## 2022-05-03 RX ADMIN — LIDOCAINE HYDROCHLORIDE 5 ML: 20 INJECTION, SOLUTION INFILTRATION; PERINEURAL at 23:06

## 2022-05-03 RX ADMIN — ONDANSETRON 4 MG: 2 INJECTION INTRAMUSCULAR; INTRAVENOUS at 20:07

## 2022-05-03 RX ADMIN — BUPIVACAINE HYDROCHLORIDE 150 MG: 5 INJECTION, SOLUTION PERINEURAL at 23:05

## 2022-05-03 RX ADMIN — HYDROMORPHONE HYDROCHLORIDE 1 MG: 1 INJECTION, SOLUTION INTRAMUSCULAR; INTRAVENOUS; SUBCUTANEOUS at 20:07

## 2022-05-03 ASSESSMENT — PAIN SCALES - GENERAL: PAINLEVEL_OUTOF10: 7

## 2022-05-03 ASSESSMENT — PAIN DESCRIPTION - LOCATION: LOCATION: ARM

## 2022-05-03 NOTE — Clinical Note
Sreekanth Gaytan was seen and treated in our emergency department on 5/3/2022. He may return to work on 05/10/2022. May return to work when cleared by orthopedic physician     If you have any questions or concerns, please don't hesitate to call.       Emerita Salinas, ANTONIO - CNP

## 2022-05-04 ENCOUNTER — TELEPHONE (OUTPATIENT)
Dept: ORTHOPEDIC SURGERY | Age: 57
End: 2022-05-04

## 2022-05-04 ENCOUNTER — OFFICE VISIT (OUTPATIENT)
Dept: ORTHOPEDIC SURGERY | Age: 57
End: 2022-05-04
Payer: COMMERCIAL

## 2022-05-04 VITALS — WEIGHT: 251 LBS | HEIGHT: 77 IN | BODY MASS INDEX: 29.64 KG/M2

## 2022-05-04 DIAGNOSIS — S42.291A OTHER CLOSED DISPLACED FRACTURE OF PROXIMAL END OF RIGHT HUMERUS, INITIAL ENCOUNTER: Primary | ICD-10-CM

## 2022-05-04 PROCEDURE — 99203 OFFICE O/P NEW LOW 30 MIN: CPT | Performed by: ORTHOPAEDIC SURGERY

## 2022-05-04 RX ORDER — RIVAROXABAN 10 MG/1
10 TABLET, FILM COATED ORAL DAILY
COMMUNITY
Start: 2022-01-30

## 2022-05-04 ASSESSMENT — ENCOUNTER SYMPTOMS
COUGH: 0
NAUSEA: 0
SORE THROAT: 0
ABDOMINAL PAIN: 0
WHEEZING: 0
SHORTNESS OF BREATH: 0
CHEST TIGHTNESS: 0
VOMITING: 0
BACK PAIN: 0

## 2022-05-04 NOTE — PROGRESS NOTES
Frankey Reil  [id number]  May 4, 2022    Chief Complaint   Patient presents with    Shoulder Pain     Right       History: The patient is a 58-year-old gentleman who is here for evaluation of his right shoulder. The patient reportedly fell off a stepladder and landed onto his right shoulder. He immediately noted right shoulder pain and swelling. He is right-hand dominant. 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 This is a consult from Chandra Saunders CNP for right shoulder pain and swelling. The patient's  past medical history, medications, allergies,  family history, social history, and have been reviewed, and dated and are recorded in the chart. Pertinent items are noted in HPI. Review of systems reviewed from Pertinent History Form dated on 5/4/22 and available in the patient's chart under the Media tab. Vitals:  Ht 6' 5\" (1.956 m)   Wt 251 lb (113.9 kg)   BMI 29.76 kg/m²     Physical: On examination today, the patient is alert and oriented x3. The patient has moderate swelling of his right shoulder. He has severe tenderness diffusely about the right shoulder. Light range of motion of the right shoulder was severely painful. He was nontender about his right elbow, wrist or hand. He is neurovascularly intact distally in the right upper extremity. Examination of the skin reveals no lesions or ulcerations. X-rays: 4 views of the right shoulder obtained in the emergency room were extensively reviewed. The x-rays confirm a displaced right proximal humerus fracture. There is mild comminution. Impression: Right displaced proximal humerus for    Plan: At this time the patient will be scheduled for open reduction and internal fixation of the right proximal humerus. All risks including infection, neurovascular injury, malunion, nonunion, hardware failure, post traumatic arthritis and the risks of anesthesia were discussed. The patient understands all risks and benefits and agrees to proceed.

## 2022-05-04 NOTE — ED NOTES
Discharge and education instructions reviewed. Patient verbalized understanding, teach-back successful. Patient denied questions at this time. No acute distress noted. Patient instructed to follow-up as noted - return to emergency department if symptoms worsen. Patient verbalized understanding. Discharged per EDMD with discharge instructions.         Frida Cannon RN  05/03/22 [id number]

## 2022-05-04 NOTE — TELEPHONE ENCOUNTER
CPT: [id number]  BODY PART: right shoulder  STATUS: outpatient  AUTHORIZATION: approved    Per Ermelinda Whaley with [employer]/ [address], approved # [id number]  2/8/10-8/3/95

## 2022-05-04 NOTE — LETTER
[facility]: [phone] F: [phone]  Surgery Scheduling Form:  DEMOGRAPHICS:                                                                                                              .    Patient Name:  Rogers Pradhan    Patient :  1965   Patient SS#:  [id number]      Patient Phone:  [phone] (home)      Patient Address:  [address] [place] [address]    Comment: Dr. Natasha Carlin will use ER note for H&P    Insurance:    Payor/Plan Subscr  Sex Relation Sub. Ins. ID Effective Group Num   1. [facility] CONRO E 1965 Male Self [id number]                                   [address] [address] [facility]     DIAGNOSIS & PROCEDURE:                                                                                            .    Diagnosis:   Displaced proximal humerus fracture- right S42.291A    Operation:  Open reduction internal fixation proximal humerus- right [id number]    [facility]:  [facility]    Surgeon:  Radha Junior    SCHEDULING INFORMATION:                                                                                         .    Requested Date:  22 OR Time:  11:00 am Patient Arrival Time:  9:00 am    OR Time Required:  90  Minutes    Anesthesia:  General    SA Required:  Yes    Equipment:  Kami- proximal humerus locking plate    Mini C-Arm:  No   Standard C-Arm:  Yes    Status:  Outpatient PAT Required:  Yes COVID19 test:  N/A    Latex Allergy:  no Defibrilator or Pacemaker:  no    Isolation Precautions:  no                    Brock Carlin MD     22   BILLING INFORMATION:                                                                                                    .                               CPT Code Modifier  Prior auth:pending                                                Pre-Admission Testing Order Set   In accordance with our formulary system, a generic equivalent drug may be dispensed and administered unless a D.A. W. is written with the medication order  All orders without checkboxes will processed automatically unless crossed out. Orders with checkboxes MUST be checked in order to be carried out. Emely Bates                                                        1965  Date of Procedure/Surgery: 5/6/22  1. H & P for ALL procedure/surgery completed within 30 days, to be updated day of procedure/surgery  2. [ ]Consults: PT/OT evaluation  3. [X ]Defer to Anesthesia for Pre-Admission testing orders  4. Diagnostic Tests:  [ ]EKG (if not completed in last 3 months) IF: (check all that apply)   Other:  [ Abner Golder (if not completed in last 6 months) IF: (check all that apply)   Hx Malignancy   Hx CVS/Thoracic Surg   CVS Disease   Hx Pneumonia last 3 months   Chronic Pulm Disease  Other:  [ ]Radiology   Knee Right Left Standing AP knee, hip to ankle on scoliosis film   Other:  5. Labs  [ ]Basic Metabolic Profile  IF: (check all that apply)  [ ]Albumin    Other:     __________________________________________________________________  [ ]CBC without diff   Pre op exam      __________________________________________________________________  [ ]PT/INR  PTT   Pre op exam         __________________________________________________________________  [ ]Type & Screen   Surg with potiential for signif.  blood loss  [ ]Type & cross match 2 units         __________________________________________________________________  [ ]Urine routine reflex to culture (UAR) If cultures positive, repeat on                  admission [ Nathanael [place] catch urine  [ ]Nasal culture for MRSA   [ ]Nasal MRSA culture  __________________________________________________________________  6. [ ]Other:      TO: ___________________________________________ Date: ____________ Time: _________    Physician Signature:          5/4/2022 11:19 AM                   Pre-operative Physician Prophylaxis Orders      Emely Bates  1965    All orders without checkboxes will be processed automatically unless crossed out. Orders with checkboxes MUST be checked in order to be carried out. 1. Allergies: Codeine    2. Procedure: Open reduction internal fixation proximal humerus- right             Ht Readings from Last 1 Encounters:   05/04/22 6' 5\" (1.956 m)               Wt Readings from Last 1 Encounters:   05/04/22 251 lb (113.9 kg)     4. [ ] DVT Prophylaxis-Contraindication (Do not give)  Allergy to heparin Currently on anticoagulation  Not indicated, low risk patient  Thrombocytopenia Admitted for bleeding diagnosis Surgery or invasive procedure  [ ] Sequential Compression Boots to unaffected or bilateral extremities  [ ] Venous Compression Hose (EAMON hose) to unaffected or bilateral extremities        Knee high  [ ] Heparin 5,000 units subcutaneously 1-2 hours pre op into the thigh opposite of operative site    5.   [ ] Beta Blocker  Patient to take beta blocker the morning of surgery with a sip of water as prescribed at home  Other:    6. Tarron.Krupa ] Antimicrobial Prophylaxis (Consensus Guideline Recommendations) for       S.C.I. P. procedures  All antibiotics to be initiated 30 minutes pre-op (prior to leaving pre-op hold area/ACU) EXCEPT: Vancomycin and Ciprofloxacin. Initiate Vancomycin and Ciprofloxacin 2 hours pre-op. For combination antibiotic orders, start Ciprofloxacin first, then start second antibiotic ordered. In house patients, send pre-op antibiotics with patient to pre-op hold, do not initiate.     Surgical Procedure Routine pre-op Antibiotic  Penicillin or Cephalosporin Allergy  Orthopaedic  X Cefazolin (Ancef) 2 gm IVPB x1 X Clindamycin 900 mg IVPB x1    or     If > 80 kg Cefazolin 2 gm IVPB x1 Vancomycin 1 gm IVPB x1  Approved indications for Vancomycin:  MRSA related chronic wound dialysis  Other antibiotic to be given:    TO: _______________________________________________________ Date: ____________ Time: _______    Physician Signature:           Date: 5/4/2022  Time: 11:19 AM    Faxed to Pharmacy RN Signature: _________________________________ Date: _________ Time: _______

## 2022-05-04 NOTE — ED PROVIDER NOTES
[address] [facility]        Pt Name: Debora Nageotte  MRN: [id number]  [place] 1965  Date of evaluation: 5/3/2022  Provider: ANTONIO Tobias - RAJESH  PCP: Tierra Cooley MD  Note Started: 10:05 PM EDT       RAYMUNDO. I have evaluated this patient. My supervising physician was available for consultation. CHIEF COMPLAINT       Chief Complaint   Patient presents with    Fall     Pt states that he fell off a 3 step ladder directly on his right shoulder pt isin blood thinners       HISTORY OF PRESENT ILLNESS   (Location, Timing/Onset, Context/Setting, Quality, Duration, Modifying Factors, Severity, Associated Signs and Symptoms)  Note limiting factors. Chief Complaint: Fall, right shoulder injury    Debora Nageotte is a 61 y.o. male who presents to the emergency department today via private car for evaluation of a right shoulder injury after mechanical fall which occurred approximately 90 minutes prior to arrival.  Patient states that he was painting and fell off of an approximate 3 foot stepladder. He states that he landed directly on the right shoulder. 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 He states that he does not think that he hit his head. He denies headache, dizziness, blurred vision. He denies having any neck pain. He denies chest pain or trouble breathing. He is right-hand dominant. He currently reports a pain level of 9 out of 10. He describes the pain as constant dull and throbbing with sharp pains that occur with minimal movement. The pain radiates to his neck and down his arm. He has not taken anything for the symptoms. He states that he is otherwise felt well and has been without fever, chills, or other symptoms. Nursing Notes were all reviewed and agreed with or any disagreements were addressed in the HPI.     REVIEW OF SYSTEMS    (2-9 systems for level 4, 10 or more for level 5)     Review of Systems   Constitutional: Negative for chills, fatigue and fever. HENT: Negative for congestion and sore throat. Eyes: Negative for visual disturbance. Respiratory: Negative for cough, chest tightness, shortness of breath and wheezing. Cardiovascular: Negative for chest pain, palpitations and leg swelling. Gastrointestinal: Negative for abdominal pain, nausea and vomiting. Endocrine: Negative for polydipsia and polyuria. Genitourinary: Negative for difficulty urinating, dysuria and flank pain. Musculoskeletal: Positive for arthralgias, joint swelling and myalgias. Negative for back pain, gait problem, neck pain and neck stiffness. Skin: Negative for rash and wound. Allergic/Immunologic: Negative for immunocompromised state. Neurological: Positive for headaches. Negative for dizziness, weakness and light-headedness. Hematological: Does not bruise/bleed easily. Psychiatric/Behavioral: Negative for suicidal ideas. Positives and Pertinent negatives as per HPI. Except as noted above in the ROS, all other systems were reviewed and negative.        PAST MEDICAL HISTORY     Past Medical History:   Diagnosis Date    DVT (deep vein thrombosis) in pregnancy     Hemochromatosis     Insomnia     Liver lesion     Pulmonary emboli (HCC)     Rib lesion          SURGICAL HISTORY     Past Surgical History:   Procedure Laterality Date    APPENDECTOMY      COLONOSCOPY  6/1/2015    dr Alisha Pina       Discharge Medication List as of 5/3/2022 11:03 PM      CONTINUE these medications which have NOT CHANGED    Details   amitriptyline (ELAVIL) 10 MG tablet TAKE TWO TABLETS BY MOUTH ONCE NIGHTLY, Disp-180 tablet, R-3Normal      omeprazole (PRILOSEC) 20 MG delayed release capsule Take 20 mg by mouth dailyHistorical Med      ELIQUIS 2.5 MG TABS tablet TAKE ONE TABLET BY MOUTH TWICE A DAY, Disp-60 tablet, R-5Normal      Multiple Vitamin (MULTI VITAMIN PO) Take by mouth nightly               ALLERGIES     Codeine    FAMILYHISTORY       Family History   Problem Relation Age of Onset    Asthma Mother     High Blood Pressure Father     Bleeding Prob Father     Anxiety Disorder Other     Learning Disabilities Other         Non Verbal          SOCIAL HISTORY       Social History     Tobacco Use    Smoking status: Never Smoker    Smokeless tobacco: Never Used   Substance Use Topics    Alcohol use: Yes     Comment: socially    Drug use: No       SCREENINGS             PHYSICAL EXAM    (up to 7 for level 4, 8 or more for level 5)     ED Triage Vitals   BP Temp Temp Source Pulse Resp SpO2 Height Weight   05/03/22 1938 05/03/22 2115 05/03/22 2115 05/03/22 1938 05/03/22 1938 05/03/22 1938 05/03/22 1938 05/03/22 1938   (!) 155/86 98.5 °F (36.9 °C) Oral 85 18 98 % 6' 5\" (1.956 m) 251 lb 5.2 oz (114 kg)       Physical Exam  Vitals and nursing note reviewed. Constitutional:       General: He is in acute distress. Appearance: Normal appearance. He is normal weight. He is not ill-appearing, toxic-appearing or diaphoretic. HENT:      Head: Normocephalic and atraumatic. Right Ear: External ear normal.      Left Ear: External ear normal.      Nose: Nose normal.      Mouth/Throat:      Mouth: Mucous membranes are dry. Pharynx: Oropharynx is clear. Eyes:      General:         Right eye: No discharge. Left eye: No discharge. Extraocular Movements: Extraocular movements intact. Conjunctiva/sclera: Conjunctivae normal.   Cardiovascular:      Rate and Rhythm: Normal rate and regular rhythm. Pulses: Normal pulses. Heart sounds: Normal heart sounds. Pulmonary:      Effort: Pulmonary effort is normal. No respiratory distress. Breath sounds: Normal breath sounds. Abdominal:      General: Bowel sounds are normal.      Palpations: Abdomen is soft. Tenderness: There is no abdominal tenderness.  There is no right CVA tenderness or left CVA tenderness. Musculoskeletal:      Right shoulder: Swelling, tenderness and bony tenderness present. No crepitus. Decreased range of motion. Decreased strength. Normal pulse. Right upper arm: Swelling, tenderness and bony tenderness present. No lacerations. Arms:       Cervical back: Normal range of motion and neck supple. No rigidity or tenderness. Comments: Right shoulder and upper arm are without erythema or ecchymosis. There is no obvious deformity. Mild edema and exquisite tenderness noted to proximal humerus. Right upper extremity neurovascular status intact. Skin:     General: Skin is warm and dry. Capillary Refill: Capillary refill takes less than 2 seconds. Neurological:      General: No focal deficit present. Mental Status: He is alert and oriented to person, place, and time. Psychiatric:         Mood and Affect: Mood normal.         Behavior: Behavior normal.         Thought Content: Thought content normal.         Judgment: Judgment normal.         DIAGNOSTIC RESULTS   LABS:    Labs Reviewed - No data to display    When ordered only abnormal lab results are displayed. All other labs were within normal range or not returned as of this dictation. EKG: When ordered, EKG's are interpreted by the Emergency Department Physician in the absence of a cardiologist.  Please see their note for interpretation of EKG. RADIOLOGY:   Non-plain film images such as CT, Ultrasound and MRI are read by the radiologist. Plain radiographic images are visualized and preliminarily interpreted by the ED Provider with the below findings:        Interpretation per the Radiologist below, if available at the time of this note:    CT HEAD WO CONTRAST   Final Result   No acute intracranial abnormality.          CT CERVICAL SPINE WO CONTRAST   Final Result   Mild old compression deformities along the superior endplates of C7 and T1   which is unchanged from the prior CT of the chest from 12/13/2021. If the   patient is persistently symptomatic at these levels, suggest MRI or bone scan   correlation. Moderate degenerative disc changes throughout the lower cervical spine no   obvious acute abnormality seen. Moderate disc osteophyte complexes from C4 through C7. XR SHOULDER RIGHT (MIN 2 VIEWS)   Final Result   Displaced comminuted fracture proximal humerus           XR SHOULDER RIGHT (MIN 2 VIEWS)    Result Date: 5/3/2022  EXAMINATION: 4 XRAY VIEWS OF THE RIGHT SHOULDER 5/3/2022 8:12 pm COMPARISON: None. HISTORY: ORDERING SYSTEM PROVIDED HISTORY: Injury TECHNOLOGIST PROVIDED HISTORY: Reason for exam:->Injury Reason for Exam: fell off ladder 3 feet FINDINGS: Displaced comminuted fracture proximal humerus. No dislocation. No other fracture. Visualized lung unremarkable. Displaced comminuted fracture proximal humerus     CT HEAD WO CONTRAST    Result Date: 5/3/2022  EXAMINATION: CT OF THE HEAD WITHOUT CONTRAST  5/3/2022 8:17 pm TECHNIQUE: CT of the head was performed without the administration of intravenous contrast. Dose modulation, iterative reconstruction, and/or weight based adjustment of the mA/kV was utilized to reduce the radiation dose to as low as reasonably achievable. COMPARISON: None. HISTORY: ORDERING SYSTEM PROVIDED HISTORY: fall from ladder/anticoagulated/distracting injury Has a \"code stroke\" or \"stroke alert\" been called? ->No Decision Support Exception - unselect if not a suspected or confirmed emergency medical condition->Emergency Medical Condition (MA) Reason for Exam: fall from ladder/anticoagulated/distracting injury FINDINGS: BRAIN/VENTRICLES: There is no acute intracranial hemorrhage, mass effect or midline shift. No abnormal extra-axial fluid collection. The gray-white differentiation is maintained without evidence of an acute infarct. There is no evidence of hydrocephalus. ORBITS: The visualized portion of the orbits demonstrate no acute abnormality. SINUSES: The visualized paranasal sinuses and mastoid air cells demonstrate no acute abnormality. SOFT TISSUES/SKULL:  No acute abnormality of the visualized skull or soft tissues. No acute intracranial abnormality. CT CERVICAL SPINE WO CONTRAST    Result Date: 5/3/2022  EXAMINATION: CT OF THE CERVICAL SPINE WITHOUT CONTRAST 5/3/2022 8:17 pm TECHNIQUE: CT of the cervical spine was performed without the administration of intravenous contrast. Multiplanar reformatted images are provided for review. Dose modulation, iterative reconstruction, and/or weight based adjustment of the mA/kV was utilized to reduce the radiation dose to as low as reasonably achievable. COMPARISON: CT chest 12/13/2021 HISTORY: ORDERING SYSTEM PROVIDED HISTORY: fall/distracting injury TECHNOLOGIST PROVIDED HISTORY: Reason for exam:->fall/distracting injury Decision Support Exception - unselect if not a suspected or confirmed emergency medical condition->Emergency Medical Condition (MA) Reason for Exam: fell off ladder FINDINGS: BONES/ALIGNMENT: The cervical vertebra are well aligned. There is mild disc space narrowing throughout. There is a mild compression deformity along the superior endplate of C7 and T1 with prominent osteophytes anteriorly which was seen on the prior CT of the chest and is unchanged. No displaced bony fragment is seen. The atlantoaxial joint is intact. DEGENERATIVE CHANGES: The posterior elements are intact. There are moderate disc osteophyte complexes at C4-5, C5-6, and C6-7 causing moderate anterior dural sac effacement. SOFT TISSUES: There is no prevertebral soft tissue swelling. Mild old compression deformities along the superior endplates of C7 and T1 which is unchanged from the prior CT of the chest from 12/13/2021. If the patient is persistently symptomatic at these levels, suggest MRI or bone scan correlation.  Moderate degenerative disc changes throughout the lower cervical spine no obvious acute abnormality seen. Moderate disc osteophyte complexes from C4 through C7. XR Shoulder Right 2 VW    Result Date: 5/3/2022  Site: [facility] #: [id number] #: [id number] #: [de-identified] #: [id number] #: [id number]: XR SHOULDER RIGHT 2VWExam Date/Time: 05/03/2022 06:35 PMAdmitting Diagnosis: fell from ladder - landed on shoulder -- decreased ROMReason for Exam: fell from ladder - landed on shoulder -- decreased ROM Dictated by: Jesús Trujillo DIANA: 05/03/2022 06:42 PMT: This document is confidential medical information. Unauthorized disclosure or use of this information is prohibited by law. If you are not the intended recipient of this document, please advise us by calling immediately [phone]. Impression/Conclusion below HISTORY:  fell from ladder - landed on shoulder -- decreased ROM Pain in right shoulder;Acute pain due to trauma COMPARISON: None NOTE:  If there are questions about the content of this report, please contact [address] [place] [address] radiology by calling [phone] FINDINGS:                 BONES:  Comminuted fracture of the humeral head and neck with mild impaction and displacement of fracture fragments. JOINTS:  Unremarkable. No evidence of significant joint space narrowing, spurring, or malalignment SOFT TISSUES:  Unremarkable OTHER:  None  IMPRESSION:  Comminuted fracture of the humeral head and neck with mild displacement and impaction of fracture fragments. No dislocation. SIGNED BY: Jake Green. Mercedes Arambula MD on 5/3/2022  6:39 PM   [facility] Imaging Report - [address] [facility] [phone] -  [facility] Call Center: [phone]        PROCEDURES   Unless otherwise noted below, none     Ortho Injury    Date/Time: 5/4/2022 11:08 PM  Performed by: ANTONIO Matthews CNP  Authorized by: ANTONIO Matthews CNP   Consent: Verbal consent obtained.   Risks and benefits: risks, benefits and alternatives were discussed  Consent given by: patient  Patient understanding: patient states understanding of the procedure being performed  Imaging studies: imaging studies available  Patient identity confirmed: verbally with patient  Injury location: upper arm  Location details: right upper arm  Injury type: fracture  Pre-procedure distal perfusion: normal  Pre-procedure neurological function: normal  Pre-procedure range of motion: reduced  Anesthesia: hematoma block    Anesthesia:  Local anesthesia used: yes  Local Anesthetic: bupivacaine 0.5% without epinephrine and lidocaine 2% without epinephrine  Anesthetic total: 10 mL    Sedation:  Patient sedated: no    Manipulation performed: no  Immobilization: sling  Post-procedure distal perfusion: normal  Post-procedure neurological function: normal  Post-procedure range of motion: unchanged  Patient tolerance: patient tolerated the procedure well with no immediate complications          CRITICAL CARE TIME   I personally saw the patient and independently provided 24 minutes of non-concurrent critical care time out of the total critical care time provided. This excludes time spent doing separately billable procedures. This includes time at the bedside, data interpretation, medication management, obtaining critical history from collateral sources if the patient is unable to provide it directly, and physician consultation. Specifics of interventions taken and potentially life-threatening diagnostic considerations are listed above in the medical decision making.         CONSULTS:  None      EMERGENCY DEPARTMENT COURSE and DIFFERENTIAL DIAGNOSIS/MDM:   Vitals:    Vitals:    05/03/22 1938 05/03/22 2115 05/03/22 2300   BP: (!) 155/86  (!) 140/80   Pulse: 85  80   Resp: 18  18   Temp:  98.5 °F (36.9 °C)    TempSrc:  Oral    SpO2: 98%  99%   Weight: 251 lb 5.2 oz (114 kg)     Height: 6' 5\" (1.956 m)         Patient was given the following medications:  Medications   HYDROmorphone (DILAUDID) injection 1 mg (1 mg IntraVENous Given 5/3/22 2007)   ondansetron [facility]) injection 4 mg (4 mg IntraVENous Given 5/3/22 2007)   oxyCODONE-acetaminophen (PERCOCET) 5-325 MG per tablet 2 tablet (2 tablets Oral Given 5/3/22 2209)   ondansetron (ZOFRAN-ODT) disintegrating tablet 4 mg (4 mg Oral Given 5/3/22 2209)   bupivacaine (MARCAINE) 0.5 % injection 150 mg (150 mg IntraDERmal Given by Other 5/3/22 2305)   lidocaine 2 % injection 5 mL (5 mLs IntraDERmal Given by Other 5/3/22 2306)   cyclobenzaprine (FLEXERIL) tablet 10 mg (10 mg Oral Given 5/3/22 2302)         Previous records reviewed in order to gain further information regarding patient's PMH as well as his HPI. Nursing notes reviewed. This is a 43-year-old  male who is anticoagulated on Eliquis for history of DVTs and PE who presents for evaluation of a right shoulder injury after mechanical fall. Physical exam complete. Patient is nontoxic, afebrile, hypertensive. GCS is 15. He is without any focal neurologic deficits. He does appear uncomfortable. He is medicated for discomfort. C-spine cannot be cleared due to patient's distracting injury. Patient is anticoagulated and unsure if he hit his head. CT head and neck interpreted by radiologist as above. Right shoulder x-ray interpreted by radiologist does show a comminuted, displaced right proximal humerus fracture, see above report for full details. Sling and swath applied after hematoma block. At this time there is no evidence of any life-threatening or emergent conditions requiring immediate intervention. There is no evidence of compartment syndrome. Patient will be discharged with emphasis on close outpatient follow-up. He agrees to contact on-call orthopedic physician first thing tomorrow morning to arrange for immediate outpatient follow-up. He agrees to hold Eliquis dose tomorrow morning until he speaks with orthopedic physician. Prescriptions for Percocet, Flexeril, and Zofran are provided.   He is advised to apply ice to the affected area for 20 minutes every 3-4 hours. He agrees return to nearest ED for high fever, incessant vomiting, severe pain, any other worsening symptoms. Patient is discharged home in stable condition. FINAL IMPRESSION      1. Closed fracture of proximal end of right humerus, unspecified fracture morphology, initial encounter    2. Fall, initial encounter          DISPOSITION/PLAN   DISPOSITION Decision To Discharge 05/03/2022 10:05:59 PM      PATIENT REFERRED TO:  Mario Mendez MD  [address] [facility]  [address] [place] [address]  [phone]    In 1 day        DISCHARGE MEDICATIONS:  Discharge Medication List as of 5/3/2022 11:03 PM      START taking these medications    Details   ondansetron (ZOFRAN ODT) 4 MG disintegrating tablet Take 1 tablet by mouth every 8 hours as needed for Nausea, Disp-20 tablet, R-0Print      oxyCODONE-acetaminophen (PERCOCET) 5-325 MG per tablet Take 1 tablet by mouth every 6 hours as needed for Pain for up to 4 days. Intended supply: 3 days.  Take lowest dose possible to manage pain, Disp-15 tablet, R-0Print             DISCONTINUED MEDICATIONS:  Discharge Medication List as of 5/3/2022 11:03 PM                 (Please note that portions of this note were completed with a voice recognition program.  Efforts were made to edit the dictations but occasionally words are mis-transcribed.)    ANTONIO Lagos CNP (electronically signed)            ANTONIO Lagos CNP  05/04/22 0016

## 2022-05-05 ENCOUNTER — ANESTHESIA EVENT (OUTPATIENT)
Dept: OPERATING ROOM | Age: 57
End: 2022-05-05
Payer: COMMERCIAL

## 2022-05-06 ENCOUNTER — APPOINTMENT (OUTPATIENT)
Dept: GENERAL RADIOLOGY | Age: 57
End: 2022-05-06
Attending: ORTHOPAEDIC SURGERY
Payer: COMMERCIAL

## 2022-05-06 ENCOUNTER — TELEPHONE (OUTPATIENT)
Dept: ORTHOPEDIC SURGERY | Age: 57
End: 2022-05-06

## 2022-05-06 ENCOUNTER — ANESTHESIA (OUTPATIENT)
Dept: OPERATING ROOM | Age: 57
End: 2022-05-06
Payer: COMMERCIAL

## 2022-05-06 ENCOUNTER — HOSPITAL ENCOUNTER (OUTPATIENT)
Age: 57
Setting detail: OUTPATIENT SURGERY
Discharge: HOME | End: 2022-05-06
Attending: ORTHOPAEDIC SURGERY | Admitting: ORTHOPAEDIC SURGERY
Payer: COMMERCIAL

## 2022-05-06 VITALS
SYSTOLIC BLOOD PRESSURE: 119 MMHG | TEMPERATURE: 97.6 F | WEIGHT: 251 LBS | BODY MASS INDEX: 29.64 KG/M2 | DIASTOLIC BLOOD PRESSURE: 69 MMHG | HEIGHT: 77 IN | OXYGEN SATURATION: 94 % | RESPIRATION RATE: 18 BRPM | HEART RATE: 73 BPM

## 2022-05-06 VITALS
TEMPERATURE: 96.8 F | DIASTOLIC BLOOD PRESSURE: 75 MMHG | OXYGEN SATURATION: 97 % | RESPIRATION RATE: 8 BRPM | SYSTOLIC BLOOD PRESSURE: 118 MMHG

## 2022-05-06 DIAGNOSIS — S42.201A CLOSED FRACTURE OF PROXIMAL END OF RIGHT HUMERUS, UNSPECIFIED FRACTURE MORPHOLOGY, INITIAL ENCOUNTER: Primary | ICD-10-CM

## 2022-05-06 PROCEDURE — 7100000000 HC PACU RECOVERY - FIRST 15 MIN: Performed by: ORTHOPAEDIC SURGERY

## 2022-05-06 PROCEDURE — 2720000010 HC SURG SUPPLY STERILE: Performed by: ORTHOPAEDIC SURGERY

## 2022-05-06 PROCEDURE — 2500000003 HC RX 250 WO HCPCS: Performed by: ORTHOPAEDIC SURGERY

## 2022-05-06 PROCEDURE — 3209999900 FLUORO FOR SURGICAL PROCEDURES

## 2022-05-06 PROCEDURE — 7100000001 HC PACU RECOVERY - ADDTL 15 MIN: Performed by: ORTHOPAEDIC SURGERY

## 2022-05-06 PROCEDURE — C1713 ANCHOR/SCREW BN/BN,TIS/BN: HCPCS | Performed by: ORTHOPAEDIC SURGERY

## 2022-05-06 PROCEDURE — 2580000003 HC RX 258: Performed by: ANESTHESIOLOGY

## 2022-05-06 PROCEDURE — 7100000010 HC PHASE II RECOVERY - FIRST 15 MIN: Performed by: ORTHOPAEDIC SURGERY

## 2022-05-06 PROCEDURE — 3700000000 HC ANESTHESIA ATTENDED CARE: Performed by: ORTHOPAEDIC SURGERY

## 2022-05-06 PROCEDURE — A4217 STERILE WATER/SALINE, 500 ML: HCPCS | Performed by: ORTHOPAEDIC SURGERY

## 2022-05-06 PROCEDURE — 6360000002 HC RX W HCPCS: Performed by: ORTHOPAEDIC SURGERY

## 2022-05-06 PROCEDURE — 2500000003 HC RX 250 WO HCPCS

## 2022-05-06 PROCEDURE — 2580000003 HC RX 258

## 2022-05-06 PROCEDURE — 6360000002 HC RX W HCPCS

## 2022-05-06 PROCEDURE — 64415 NJX AA&/STRD BRCH PLXS IMG: CPT | Performed by: ANESTHESIOLOGY

## 2022-05-06 PROCEDURE — 7100000011 HC PHASE II RECOVERY - ADDTL 15 MIN: Performed by: ORTHOPAEDIC SURGERY

## 2022-05-06 PROCEDURE — 3600000004 HC SURGERY LEVEL 4 BASE: Performed by: ORTHOPAEDIC SURGERY

## 2022-05-06 PROCEDURE — 3600000014 HC SURGERY LEVEL 4 ADDTL 15MIN: Performed by: ORTHOPAEDIC SURGERY

## 2022-05-06 PROCEDURE — 6360000002 HC RX W HCPCS: Performed by: ANESTHESIOLOGY

## 2022-05-06 PROCEDURE — 73030 X-RAY EXAM OF SHOULDER: CPT

## 2022-05-06 PROCEDURE — 2709999900 HC NON-CHARGEABLE SUPPLY: Performed by: ORTHOPAEDIC SURGERY

## 2022-05-06 PROCEDURE — 2580000003 HC RX 258: Performed by: ORTHOPAEDIC SURGERY

## 2022-05-06 PROCEDURE — 3700000001 HC ADD 15 MINUTES (ANESTHESIA): Performed by: ORTHOPAEDIC SURGERY

## 2022-05-06 DEVICE — IMPLANTABLE DEVICE: Type: IMPLANTABLE DEVICE | Site: ARM | Status: FUNCTIONAL

## 2022-05-06 RX ORDER — SODIUM CHLORIDE 0.9 % (FLUSH) 0.9 %
5-40 SYRINGE (ML) INJECTION EVERY 12 HOURS SCHEDULED
Status: DISCONTINUED | OUTPATIENT
Start: 2022-05-06 | End: 2022-05-06 | Stop reason: HOSPADM

## 2022-05-06 RX ORDER — SODIUM CHLORIDE 0.9 % (FLUSH) 0.9 %
5-40 SYRINGE (ML) INJECTION PRN
Status: DISCONTINUED | OUTPATIENT
Start: 2022-05-06 | End: 2022-05-06 | Stop reason: HOSPADM

## 2022-05-06 RX ORDER — FENTANYL CITRATE 50 UG/ML
INJECTION, SOLUTION INTRAMUSCULAR; INTRAVENOUS PRN
Status: DISCONTINUED | OUTPATIENT
Start: 2022-05-06 | End: 2022-05-06 | Stop reason: SDUPTHER

## 2022-05-06 RX ORDER — MAGNESIUM HYDROXIDE 1200 MG/15ML
LIQUID ORAL CONTINUOUS PRN
Status: COMPLETED | OUTPATIENT
Start: 2022-05-06 | End: 2022-05-06

## 2022-05-06 RX ORDER — DEXAMETHASONE SODIUM PHOSPHATE 4 MG/ML
INJECTION, SOLUTION INTRA-ARTICULAR; INTRALESIONAL; INTRAMUSCULAR; INTRAVENOUS; SOFT TISSUE PRN
Status: DISCONTINUED | OUTPATIENT
Start: 2022-05-06 | End: 2022-05-06 | Stop reason: SDUPTHER

## 2022-05-06 RX ORDER — PHENYLEPHRINE HCL IN 0.9% NACL 1 MG/10 ML
SYRINGE (ML) INTRAVENOUS PRN
Status: DISCONTINUED | OUTPATIENT
Start: 2022-05-06 | End: 2022-05-06 | Stop reason: SDUPTHER

## 2022-05-06 RX ORDER — OXYCODONE HYDROCHLORIDE AND ACETAMINOPHEN 5; 325 MG/1; MG/1
1-2 TABLET ORAL EVERY 6 HOURS PRN
Qty: 40 TABLET | Refills: 0 | Status: SHIPPED | OUTPATIENT
Start: 2022-05-06 | End: 2022-05-13 | Stop reason: SDUPTHER

## 2022-05-06 RX ORDER — ONDANSETRON 2 MG/ML
4 INJECTION INTRAMUSCULAR; INTRAVENOUS
Status: DISCONTINUED | OUTPATIENT
Start: 2022-05-06 | End: 2022-05-06 | Stop reason: HOSPADM

## 2022-05-06 RX ORDER — FENTANYL CITRATE 50 UG/ML
25 INJECTION, SOLUTION INTRAMUSCULAR; INTRAVENOUS EVERY 5 MIN PRN
Status: DISCONTINUED | OUTPATIENT
Start: 2022-05-06 | End: 2022-05-06 | Stop reason: HOSPADM

## 2022-05-06 RX ORDER — SUCCINYLCHOLINE/SOD CL,ISO/PF 200MG/10ML
SYRINGE (ML) INTRAVENOUS PRN
Status: DISCONTINUED | OUTPATIENT
Start: 2022-05-06 | End: 2022-05-06 | Stop reason: SDUPTHER

## 2022-05-06 RX ORDER — ONDANSETRON 2 MG/ML
INJECTION INTRAMUSCULAR; INTRAVENOUS PRN
Status: DISCONTINUED | OUTPATIENT
Start: 2022-05-06 | End: 2022-05-06 | Stop reason: SDUPTHER

## 2022-05-06 RX ORDER — BUPIVACAINE HYDROCHLORIDE AND EPINEPHRINE 5; 5 MG/ML; UG/ML
INJECTION, SOLUTION EPIDURAL; INTRACAUDAL; PERINEURAL
Status: COMPLETED | OUTPATIENT
Start: 2022-05-06 | End: 2022-05-06

## 2022-05-06 RX ORDER — ROPIVACAINE HYDROCHLORIDE 5 MG/ML
INJECTION, SOLUTION EPIDURAL; INFILTRATION; PERINEURAL
Status: COMPLETED | OUTPATIENT
Start: 2022-05-06 | End: 2022-05-06

## 2022-05-06 RX ORDER — ROCURONIUM BROMIDE 10 MG/ML
INJECTION, SOLUTION INTRAVENOUS PRN
Status: DISCONTINUED | OUTPATIENT
Start: 2022-05-06 | End: 2022-05-06 | Stop reason: SDUPTHER

## 2022-05-06 RX ORDER — PROPOFOL 10 MG/ML
INJECTION, EMULSION INTRAVENOUS PRN
Status: DISCONTINUED | OUTPATIENT
Start: 2022-05-06 | End: 2022-05-06 | Stop reason: SDUPTHER

## 2022-05-06 RX ORDER — MIDAZOLAM HYDROCHLORIDE 1 MG/ML
INJECTION INTRAMUSCULAR; INTRAVENOUS PRN
Status: DISCONTINUED | OUTPATIENT
Start: 2022-05-06 | End: 2022-05-06 | Stop reason: SDUPTHER

## 2022-05-06 RX ORDER — KETOROLAC TROMETHAMINE 30 MG/ML
INJECTION, SOLUTION INTRAMUSCULAR; INTRAVENOUS PRN
Status: DISCONTINUED | OUTPATIENT
Start: 2022-05-06 | End: 2022-05-06 | Stop reason: SDUPTHER

## 2022-05-06 RX ORDER — LIDOCAINE HYDROCHLORIDE 20 MG/ML
INJECTION, SOLUTION EPIDURAL; INFILTRATION; INTRACAUDAL; PERINEURAL PRN
Status: DISCONTINUED | OUTPATIENT
Start: 2022-05-06 | End: 2022-05-06 | Stop reason: SDUPTHER

## 2022-05-06 RX ORDER — SODIUM CHLORIDE 9 MG/ML
INJECTION, SOLUTION INTRAVENOUS PRN
Status: DISCONTINUED | OUTPATIENT
Start: 2022-05-06 | End: 2022-05-06 | Stop reason: HOSPADM

## 2022-05-06 RX ORDER — DEXMEDETOMIDINE HYDROCHLORIDE 100 UG/ML
INJECTION, SOLUTION INTRAVENOUS PRN
Status: DISCONTINUED | OUTPATIENT
Start: 2022-05-06 | End: 2022-05-06 | Stop reason: SDUPTHER

## 2022-05-06 RX ADMIN — SODIUM CHLORIDE: 9 INJECTION, SOLUTION INTRAVENOUS at 10:52

## 2022-05-06 RX ADMIN — DEXMEDETOMIDINE HYDROCHLORIDE 4 MCG: 100 INJECTION, SOLUTION INTRAVENOUS at 12:58

## 2022-05-06 RX ADMIN — DEXMEDETOMIDINE HYDROCHLORIDE 4 MCG: 100 INJECTION, SOLUTION INTRAVENOUS at 12:19

## 2022-05-06 RX ADMIN — DEXAMETHASONE SODIUM PHOSPHATE 10 MG: 4 INJECTION, SOLUTION INTRAMUSCULAR; INTRAVENOUS at 11:14

## 2022-05-06 RX ADMIN — CEFAZOLIN 2000 MG: 10 INJECTION, POWDER, FOR SOLUTION INTRAVENOUS at 11:09

## 2022-05-06 RX ADMIN — ROPIVACAINE HYDROCHLORIDE 30 ML: 5 INJECTION, SOLUTION EPIDURAL; INFILTRATION; PERINEURAL at 10:28

## 2022-05-06 RX ADMIN — Medication 100 MCG: at 11:49

## 2022-05-06 RX ADMIN — Medication 100 MCG: at 12:42

## 2022-05-06 RX ADMIN — DEXMEDETOMIDINE HYDROCHLORIDE 4 MCG: 100 INJECTION, SOLUTION INTRAVENOUS at 12:51

## 2022-05-06 RX ADMIN — ONDANSETRON 4 MG: 2 INJECTION INTRAMUSCULAR; INTRAVENOUS at 11:14

## 2022-05-06 RX ADMIN — MIDAZOLAM 2 MG: 1 INJECTION INTRAMUSCULAR; INTRAVENOUS at 10:26

## 2022-05-06 RX ADMIN — Medication 100 MCG: at 11:39

## 2022-05-06 RX ADMIN — PHENYLEPHRINE HYDROCHLORIDE 25 MCG/MIN: 10 INJECTION INTRAVENOUS at 11:53

## 2022-05-06 RX ADMIN — Medication 100 MCG: at 11:47

## 2022-05-06 RX ADMIN — DEXMEDETOMIDINE HYDROCHLORIDE 8 MCG: 100 INJECTION, SOLUTION INTRAVENOUS at 11:30

## 2022-05-06 RX ADMIN — PROPOFOL 180 MG: 10 INJECTION, EMULSION INTRAVENOUS at 11:03

## 2022-05-06 RX ADMIN — Medication 100 MCG: at 11:36

## 2022-05-06 RX ADMIN — DEXMEDETOMIDINE HYDROCHLORIDE 4 MCG: 100 INJECTION, SOLUTION INTRAVENOUS at 12:28

## 2022-05-06 RX ADMIN — FENTANYL CITRATE 50 MCG: 50 INJECTION INTRAMUSCULAR; INTRAVENOUS at 10:26

## 2022-05-06 RX ADMIN — Medication 140 MG: at 11:04

## 2022-05-06 RX ADMIN — Medication 100 MCG: at 11:28

## 2022-05-06 RX ADMIN — KETOROLAC TROMETHAMINE 30 MG: 30 INJECTION, SOLUTION INTRAMUSCULAR at 12:42

## 2022-05-06 RX ADMIN — LIDOCAINE HYDROCHLORIDE 60 MG: 20 INJECTION, SOLUTION EPIDURAL; INFILTRATION; INTRACAUDAL; PERINEURAL at 11:03

## 2022-05-06 RX ADMIN — FENTANYL CITRATE 50 MCG: 50 INJECTION INTRAMUSCULAR; INTRAVENOUS at 11:02

## 2022-05-06 RX ADMIN — DEXMEDETOMIDINE HYDROCHLORIDE 4 MCG: 100 INJECTION, SOLUTION INTRAVENOUS at 12:11

## 2022-05-06 RX ADMIN — ROCURONIUM BROMIDE 50 MG: 10 INJECTION INTRAVENOUS at 11:14

## 2022-05-06 ASSESSMENT — PULMONARY FUNCTION TESTS
PIF_VALUE: 3
PIF_VALUE: 15
PIF_VALUE: 16
PIF_VALUE: 15
PIF_VALUE: 0
PIF_VALUE: 15
PIF_VALUE: 16
PIF_VALUE: 4
PIF_VALUE: 16
PIF_VALUE: 14
PIF_VALUE: 16
PIF_VALUE: 3
PIF_VALUE: 15
PIF_VALUE: 15
PIF_VALUE: 16
PIF_VALUE: 3
PIF_VALUE: 14
PIF_VALUE: 3
PIF_VALUE: 12
PIF_VALUE: 15
PIF_VALUE: 3
PIF_VALUE: 15
PIF_VALUE: 6
PIF_VALUE: 15
PIF_VALUE: 0
PIF_VALUE: 15
PIF_VALUE: 15
PIF_VALUE: 1
PIF_VALUE: 15
PIF_VALUE: 16
PIF_VALUE: 3
PIF_VALUE: 27
PIF_VALUE: 2
PIF_VALUE: 3
PIF_VALUE: 27
PIF_VALUE: 15
PIF_VALUE: 14
PIF_VALUE: 3
PIF_VALUE: 15
PIF_VALUE: 15
PIF_VALUE: 6
PIF_VALUE: 16
PIF_VALUE: 15
PIF_VALUE: 15
PIF_VALUE: 18
PIF_VALUE: 3
PIF_VALUE: 16
PIF_VALUE: 16
PIF_VALUE: 18
PIF_VALUE: 15
PIF_VALUE: 3
PIF_VALUE: 3
PIF_VALUE: 15
PIF_VALUE: 16
PIF_VALUE: 15
PIF_VALUE: 16
PIF_VALUE: 15
PIF_VALUE: 15
PIF_VALUE: 14
PIF_VALUE: 15
PIF_VALUE: 16
PIF_VALUE: 15
PIF_VALUE: 15
PIF_VALUE: 28
PIF_VALUE: 16
PIF_VALUE: 12
PIF_VALUE: 9
PIF_VALUE: 3
PIF_VALUE: 16
PIF_VALUE: 15
PIF_VALUE: 3
PIF_VALUE: 3
PIF_VALUE: 16
PIF_VALUE: 4
PIF_VALUE: 15
PIF_VALUE: 0
PIF_VALUE: 3
PIF_VALUE: 15
PIF_VALUE: 0
PIF_VALUE: 15
PIF_VALUE: 16
PIF_VALUE: 3
PIF_VALUE: 15
PIF_VALUE: 16
PIF_VALUE: 3
PIF_VALUE: 71
PIF_VALUE: 16
PIF_VALUE: 3
PIF_VALUE: 15
PIF_VALUE: 2
PIF_VALUE: 3
PIF_VALUE: 3
PIF_VALUE: 15
PIF_VALUE: 7
PIF_VALUE: 15
PIF_VALUE: 16
PIF_VALUE: 16
PIF_VALUE: 3
PIF_VALUE: 15
PIF_VALUE: 3
PIF_VALUE: 3
PIF_VALUE: 15
PIF_VALUE: 14
PIF_VALUE: 16
PIF_VALUE: 3
PIF_VALUE: 7
PIF_VALUE: 3
PIF_VALUE: 16
PIF_VALUE: 16
PIF_VALUE: 15
PIF_VALUE: 5
PIF_VALUE: 16
PIF_VALUE: 15
PIF_VALUE: 3
PIF_VALUE: 14
PIF_VALUE: 15
PIF_VALUE: 15
PIF_VALUE: 16
PIF_VALUE: 3
PIF_VALUE: 17
PIF_VALUE: 12
PIF_VALUE: 3
PIF_VALUE: 3
PIF_VALUE: 16
PIF_VALUE: 15

## 2022-05-06 ASSESSMENT — PAIN - FUNCTIONAL ASSESSMENT
PAIN_FUNCTIONAL_ASSESSMENT: PREVENTS OR INTERFERES SOME ACTIVE ACTIVITIES AND ADLS
PAIN_FUNCTIONAL_ASSESSMENT: 0-10

## 2022-05-06 ASSESSMENT — PAIN SCALES - GENERAL
PAINLEVEL_OUTOF10: 0

## 2022-05-06 ASSESSMENT — PAIN DESCRIPTION - DESCRIPTORS: DESCRIPTORS: ACHING;SORE

## 2022-05-06 ASSESSMENT — ENCOUNTER SYMPTOMS: SHORTNESS OF BREATH: 0

## 2022-05-06 NOTE — ANESTHESIA PROCEDURE NOTES
Peripheral Block    Patient location during procedure: pre-op  Staffing  Performed: anesthesiologist   Anesthesiologist: Sulma Cedillo MD  Preanesthetic Checklist  Completed: patient identified, IV checked, site marked, risks and benefits discussed, surgical consent, monitors and equipment checked, pre-op evaluation, timeout performed, anesthesia consent given, oxygen available and patient being monitored  Peripheral Block  Patient position: sitting  Prep: ChloraPrep  Patient monitoring: cardiac monitor, continuous pulse ox, frequent blood pressure checks and IV access  Block type: Brachial plexus  Laterality: right  Injection technique: single-shot  Guidance: ultrasound guided  Interscalene  Provider prep: mask and sterile gloves  Needle  Needle type: insulated echogenic nerve stimulator needle   Needle gauge: 22 G  Needle length: 2in. Needle localization: ultrasound guidance  Assessment  Injection assessment: negative aspiration for heme, no paresthesia on injection and local visualized surrounding nerve on ultrasound  Paresthesia pain: none  Slow fractionated injection: yes  Hemodynamics: stableOutcomes: patient tolerated procedure well  Additional Notes  Immediately prior to procedure a \"time out\" was called to verify the correct patient, allergies, laterality, procedure and equipment. Time out performed with Oletha Maxim CRNA    Local Anesthetic: 0.5 %  Ropivacaine   Amount: 30 ml  in 5 ml increments after negative aspiration each time. Anterior scalene and middle scalene muscles, upper, middle and lower trunks of the brachial plexus are identified, the tip of the needle and the spread of the local anesthetic around the brachial plexus are visualized. The Brachial plexus appeared to be anatomically normal and there were no abnormal pathologically findings seen.          Medications Administered  Ropivacaine (NAROPIN) injection 0.5%, 30 mL  Reason for block: post-op pain management and at surgeon's request

## 2022-05-06 NOTE — ANESTHESIA PRE PROCEDURE
Department of Anesthesiology  Preprocedure Note       Name:  Coral Garcia   Age:  61 y.o.  :  1965                                          MRN:  [id number]         Date:  2022      Surgeon: Liliana Hurley):  Mendel Ruddle, MD    Procedure: Procedure(s):  OPEN REDUCTION INTERNAL FIXATION PROXIMAL HUMERUS, RIGHT    Medications prior to admission:   Prior to Admission medications    Medication Sig Start Date End Date Taking? Authorizing Provider   rivaroxaban (XARELTO) 10 MG TABS tablet Take 10 mg by mouth daily 22  Yes Historical Provider, MD   ondansetron (ZOFRAN ODT) 4 MG disintegrating tablet Take 1 tablet by mouth every 8 hours as needed for Nausea 5/3/22   ANTONIO Mena CNP   cyclobenzaprine (FLEXERIL) 10 MG tablet Take 1 tablet by mouth 3 times daily as needed for Muscle spasms 5/3/22 5/10/22  ANTONIO Mena CNP   oxyCODONE-acetaminophen (PERCOCET) 7.5-325 MG per tablet Take 1 tablet by mouth every 6 hours as needed for Pain for up to 4 days. 5/3/22 5/7/22  ANTONIO Mena CNP   amitriptyline (ELAVIL) 10 MG tablet TAKE TWO TABLETS BY MOUTH ONCE NIGHTLY 22   Ann Marie Fontana MD   omeprazole (PRILOSEC) 20 MG delayed release capsule Take 20 mg by mouth daily    Historical Provider, MD   Multiple Vitamin (MULTI VITAMIN PO) Take by mouth nightly    Historical Provider, MD       Current medications:    Current Facility-Administered Medications   Medication Dose Route Frequency Provider Last Rate Last Admin    ceFAZolin (ANCEF) 2000 mg in dextrose 5 % 100 mL IVPB  2,000 mg IntraVENous Once Mendel Ruddle, MD        sodium chloride flush 0.9 % injection 5-40 mL  5-40 mL IntraVENous 2 times per day Juan J Tinsley MD        sodium chloride flush 0.9 % injection 5-40 mL  5-40 mL IntraVENous PRN Juan J Tinsley MD        0.9 % sodium chloride infusion   IntraVENous PRN Juan J Tinsley MD           Allergies:     Allergies   Allergen Reactions    Codeine Nausea Only       Problem List:    Patient Active Problem List   Diagnosis Code    Insomnia G47.00    Acute septic pulmonary embolism without acute cor pulmonale (HCC) I26.90    Pulmonary nodule R91.1       Past Medical History:        Diagnosis Date    Hemochromatosis     family history    Hx of blood clots     DVT    Insomnia     Liver lesion     Pulmonary emboli ([address]     Rib lesion        Past Surgical History:        Procedure Laterality Date    APPENDECTOMY      COLONOSCOPY  6/1/2015    dr Padgett Sellar X`s 2    NECK SURGERY      biopsy    TONSILLECTOMY      WISDOM TOOTH EXTRACTION         Social History:    Social History     Tobacco Use    Smoking status: Never Smoker    Smokeless tobacco: Never Used   Substance Use Topics    Alcohol use: Yes     Comment: socially                                Counseling given: Not Answered      Vital Signs (Current):   Vitals:    05/04/22 1546   Weight: 251 lb (113.9 kg)   Height: 6' 5\" (1.956 m)                                              BP Readings from Last 3 Encounters:   05/03/22 (!) 140/80   11/19/21 (!) 138/90   12/11/20 138/86       NPO Status:                            >8hrs                                                       BMI:   Wt Readings from Last 3 Encounters:   05/04/22 251 lb (113.9 kg)   05/04/22 251 lb (113.9 kg)   05/03/22 251 lb 5.2 oz (114 kg)     Body mass index is 29.76 kg/m².     CBC:   Lab Results   Component Value Date    WBC 6.4 11/19/2021    RBC 5.46 11/19/2021    RBC 5.27 01/23/2017    HGB 16.6 11/19/2021    HCT 48.0 11/19/2021    MCV 88.0 11/19/2021    RDW 13.3 11/19/2021     11/19/2021       CMP:   Lab Results   Component Value Date     11/19/2021    K 4.5 11/19/2021     11/19/2021    CO2 29 11/19/2021    BUN 15 11/19/2021    CREATININE 1.2 11/19/2021    GFRAA >60 11/19/2021    AGRATIO 2.0 11/19/2021    LABGLOM >60 11/19/2021    GLUCOSE 104 11/19/2021    GLUCOSE 105 01/23/2017    PROT 7.2 11/19/2021    PROT 8.0 01/23/2017 CALCIUM 9.6 11/19/2021    BILITOT 0.8 11/19/2021    ALKPHOS 55 11/19/2021    AST 18 11/19/2021    ALT 32 11/19/2021       POC Tests: No results for input(s): POCGLU, POCNA, POCK, POCCL, POCBUN, POCHEMO, POCHCT in the last 72 hours. Coags: No results found for: PROTIME, INR, APTT    HCG (If Applicable): No results found for: PREGTESTUR, PREGSERUM, HCG, HCGQUANT     ABGs: No results found for: PHART, PO2ART, EZJ7LWF, ZKQ2SON, BEART, N6HNBVEL     Type & Screen (If Applicable):  No results found for: LABABO, LABRH    Drug/Infectious Status (If Applicable):  No results found for: HIV, HEPCAB    COVID-19 Screening (If Applicable): No results found for: COVID19        Anesthesia Evaluation  Patient summary reviewed no history of anesthetic complications:   Airway: Mallampati: III  TM distance: >3 FB   Neck ROM: full  Mouth opening: > = 3 FB Dental:      Comment: Missing tooth    Pulmonary: breath sounds clear to auscultation      (-) COPD, asthma, shortness of breath, recent URI and sleep apnea                           Cardiovascular:        (-) hypertension (borderline-no meds currently), valvular problems/murmurs, past MI, CAD, CABG/stent, dysrhythmias,  angina,  CHF and no pulmonary hypertension      Rhythm: regular  Rate: normal                    Neuro/Psych:      (-) seizures, neuromuscular disease, TIA, CVA and headaches           GI/Hepatic/Renal:   (+) GERD: well controlled,      (-) PUD, hepatitis, liver disease and no renal disease       Endo/Other:    (+) blood dyscrasia: anticoagulation therapy:., .    (-) diabetes mellitus, hypothyroidism               Abdominal:             Vascular:   + DVT, PE. Other Findings:             Anesthesia Plan      general     ASA 3       Induction: intravenous. MIPS: Prophylactic antiemetics administered. Anesthetic plan and risks discussed with patient and spouse. Plan discussed with CRNA.               This pre-anesthesia assessment may be used as a history and physical.    DOS STAFF ADDENDUM:    Pt seen and examined, chart reviewed (including anesthesia, drug and allergy history). No interval changes to history and physical examination. Anesthetic plan, risks, benefits, alternatives, and personnel involved discussed with patient. Patient verbalized an understanding and agrees to proceed.       Jordi Jimenez MD  May 6, 2022  9:41 AM      Jordi Jimenez MD   5/6/2022

## 2022-05-06 NOTE — PROGRESS NOTES
Incentive spirometry instructed for pt to use at home every hour 10-15 repetitions while awake-- pt demonstrated use with satisfactory results: pts family present for instructions and verbalized no additional question.

## 2022-05-06 NOTE — OP NOTE
Operative Note      Patient: Joe Casarez  YOB: 1965  MRN: [id number]    Date of Procedure: 5/6/2022    Pre-Op Diagnosis: DISPLACED PROXIMAL HUMERUS FRACTURE RIGHT    Post-Op Diagnosis: Same       Procedure(s):  OPEN REDUCTION INTERNAL FIXATION PROXIMAL HUMERUS, RIGHT #2 intraoperative biplanar C arm fluoroscopic evaluation and interpretation. Surgeon(s):  Екатерина Roca MD    Assistant:   Surgical Assistant: Isaura Blackwell    Anesthesia: General and interscalene block    Estimated Blood Loss (mL): 490     Complications: None    Specimens:   * No specimens in log *    Implants:  * No implants in log *      Drains: * No LDAs found *    Findings: Mild comminution noted at the fracture site. Detailed Description of Procedure:   History: The patient is a 50-year-old gentleman who stepped awkwardly off a stepladder and injured his right shoulder. Immediately noted right shoulder pain and swelling. He was seen in my office and found to have a displaced right proximal humerus fracture. The patient was ultimately cleared and scheduled for surgery. Report: The patient was identified preoperatively. The right upper extremity was then marked. Interscalene block was administered per anesthesia. Appropriate antibiotics were administered per SCIP measures. The patient was then taken to the operating room and general anesthesia was administered by anesthesia. The patient was positioned in a semiflexed position in the beachchair. All bony prominences were padded. The right upper extremity was then ChloraPrepped in standard fashion. We then draped out in standard fashion. We then were ready for incision. We incised skin and coagulated all bleeders with Bovie electrocautery. We did infiltrate her incision site with half percent Marcaine with epinephrine. We then dissected down and identified the cephalic vein.   We identified the deltopectoral interval.  We retracted the deltoid and the cephalic vein laterally. We retracted the pectoralis major inferomedially. We then got down onto the clavipectoral fascia and bluntly dissected through the clavipectoral fascia. We retracted the strap muscles medially. We got down onto the fracture site and irrigated out the fracture rather copiously. There was mild comminution at the fracture site. The fracture was shortened and significantly impacted. We did use 2 of the #2 Ethibond sutures and gain control of the proximal humerus/humeral head. We then used a longitudinal traction to disimpact the fracture. We did use intraoperative biplanar C arm fluoroscopy throughout the entire procedure. Using the large C arm, we were quite satisfied with our reduction. We then went ahead and seated a 6-hole Kami proximal humeral periarticular locking plate across the fracture. We provisionally fixated the plate in proper position with K wires. We then went ahead and fixated the plate to the humeral shaft with a cortical screw. We drilled, depth gauge and inserted the appropriate length screw. We then went ahead and inserted locking screws proximally. These were drilled, depth gauge and inserted in appropriate fashion. Again, using biplanar C arm fluoroscopy we were satisfied with the position of the screws and the length of the screws. We then inserted 3 more cortical screws distally to make a total of 4 cortical screws distal to the fracture. These were all drilled, depth gauge and inserted in appropriate fashion. All hardware was in good position. The fracture was well reduced. We irrigated rather copiously. We then were ready for closure. We loosely reapproximated the deltoid back to the pectoralis major with interrupted figure-of-eight #1 Vicryl stitches. We closed subcutaneous tissues with interrupted 2-0 simple Vicryl stitches. We then closed with a strata fix. We then applied a Prineo dressing. Sterile dressings were applied.   The patient was put in a sling. There were no complications.     Electronically signed by Queen Bharath MD on 5/6/2022 at 10:53 AM

## 2022-05-06 NOTE — ANESTHESIA POSTPROCEDURE EVALUATION
Department of Anesthesiology  Postprocedure Note    Patient: Joe Casarez  MRN: [id number]  YOB: 1965  Date of evaluation: 5/6/2022  Time:  2:13 PM     Procedure Summary     Date: 05/06/22 Room / Location: [address] [place] [address] / [facility]    Anesthesia Start: 7913 Anesthesia Stop: 8127    Procedure: OPEN REDUCTION INTERNAL FIXATION PROXIMAL HUMERUS, RIGHT (Right Arm Upper) Diagnosis: (DISPLACED PROXIMAL HUMERUS FRACTURE RIGHT)    Surgeons: Екатерина Roca MD Responsible Provider: April Stallings MD    Anesthesia Type: general ASA Status: 3          Anesthesia Type: No value filed. Scott Phase I: Scott Score: 8    Scott Phase II:      Last vitals: Reviewed and per EMR flowsheets.        Anesthesia Post Evaluation    Patient location during evaluation: PACU  Level of consciousness: awake and alert  Airway patency: patent  Nausea & Vomiting: no nausea and no vomiting  Complications: no  Cardiovascular status: blood pressure returned to baseline  Respiratory status: acceptable  Hydration status: euvolemic  Comments: Postoperative Anesthesia Note    Name:    Joe Casarez  MRN:      [id number]    Patient Vitals in the past 12 hrs:  05/06/22 1345, BP:98/70, Temp:97.3 °F (36.3 °C), Pulse:79, Resp:10, SpO2:94 %  05/06/22 1330, BP:99/66, Pulse:80, Resp:16, SpO2:93 %  05/06/22 1325, BP:100/67, Pulse:83, Resp:16, SpO2:93 %  05/06/22 1320, BP:104/74, Pulse:90, Resp:15, SpO2:(!) 89 %  05/06/22 1315, BP:103/65, Pulse:84, Resp:18, SpO2:91 %  05/06/22 1310, BP:108/68, Pulse:80, Resp:12, SpO2:94 %  05/06/22 1309, BP:108/68, Temp:96.6 °F (35.9 °C), Temp src:Temporal, Pulse:78, Resp:16, SpO2:93 %  05/06/22 0956, BP:(!) 170/91, Temp:97 °F (36.1 °C), Temp src:Temporal, Pulse:94, Resp:16, SpO2:95 %, Height:6' 5\" (1.956 m), Weight:251 lb (113.9 kg)     LABS:    CBC  Lab Results       Component                Value               Date/Time                  WBC                      6.4 11/19/2021 10:19 AM        HGB                      16.6                11/19/2021 10:19 AM        HCT                      48.0                11/19/2021 10:19 AM        PLT                      216                 11/19/2021 10:19 AM   RENAL  Lab Results       Component                Value               Date/Time                  NA                       142                 11/19/2021 10:19 AM        K                        4.5                 11/19/2021 10:19 AM        CL                       103                 11/19/2021 10:19 AM        CO2                      29                  11/19/2021 10:19 AM        BUN                      15                  11/19/2021 10:19 AM        CREATININE               1.2                 11/19/2021 10:19 AM        GLUCOSE                  104 (H)             11/19/2021 10:19 AM        GLUCOSE                  105                 01/23/2017 11:49 AM   COAGS  No results found for: PROTIME, INR, APTT    Intake & Output:  @24HRIO@    Nausea & Vomiting:  No    Level of Consciousness:  Awake    Pain Assessment:  Adequate analgesia    Anesthesia Complications:  No apparent anesthetic complications    SUMMARY      Vital signs stable  OK to discharge from Stage I post anesthesia care.   Care transferred from Anesthesiology department on discharge from perioperative area

## 2022-05-06 NOTE — PROGRESS NOTES
Patient given influenza vaccine left deltoid, tolerated well, no complaints, no reaction noted   Patient to pacu from OR 5, vss on room air, will continue to monitor. 1320: Patient put on 2lnc due to O2 saturation <90%, will continue to monitor.

## 2022-05-06 NOTE — H&P
The patient was interviewed and examined and there have been no changes since the documented History and Physical.  I have presented reasonable alternatives to the patient's proposed care, treatment and services. The discussion I have done encompassed risks, benefits and side effects related to the alternatives and the risks related to not receiving the proposed care, treatment and services.     Electronically signed by Lorenza Norman MD on 5/6/2022 at 10:52 AM

## 2022-05-06 NOTE — PROGRESS NOTES
Advancing hob without compaints  Tolerating Drinking  : Attempted to implement non pharmacological methods  of pain management-repositioned/ ice in place   No ponv  Pt alert and appropriate  Respirations  Easy/ unlabored/ no Chest pain or SOB   Surgical drsg unchanged from initial assessment  Circulation checks on operative limb unchanged from last entry in pacu

## 2022-05-06 NOTE — PROGRESS NOTES
Using IS properly. O2 sat maintained at 94% on room air. Denies discomfort. ROM intact to right hand.

## 2022-05-09 ENCOUNTER — TELEPHONE (OUTPATIENT)
Dept: ORTHOPEDIC SURGERY | Age: 57
End: 2022-05-09

## 2022-05-13 ENCOUNTER — OFFICE VISIT (OUTPATIENT)
Dept: ORTHOPEDIC SURGERY | Age: 57
End: 2022-05-13
Payer: COMMERCIAL

## 2022-05-13 VITALS — HEIGHT: 77 IN | WEIGHT: 251 LBS | BODY MASS INDEX: 29.64 KG/M2

## 2022-05-13 DIAGNOSIS — S42.291A OTHER CLOSED DISPLACED FRACTURE OF PROXIMAL END OF RIGHT HUMERUS, INITIAL ENCOUNTER: Primary | ICD-10-CM

## 2022-05-13 DIAGNOSIS — S42.201A CLOSED FRACTURE OF PROXIMAL END OF RIGHT HUMERUS, UNSPECIFIED FRACTURE MORPHOLOGY, INITIAL ENCOUNTER: ICD-10-CM

## 2022-05-13 PROCEDURE — 99024 POSTOP FOLLOW-UP VISIT: CPT | Performed by: ORTHOPAEDIC SURGERY

## 2022-05-13 RX ORDER — OXYCODONE HYDROCHLORIDE AND ACETAMINOPHEN 5; 325 MG/1; MG/1
1 TABLET ORAL EVERY 8 HOURS PRN
Qty: 21 TABLET | Refills: 0 | Status: SHIPPED | OUTPATIENT
Start: 2022-05-13 | End: 2022-05-20

## 2022-05-13 NOTE — PROGRESS NOTES
Frances Block  [id number]  May 13, 2022    Chief Complaint   Patient presents with    Post-Op Check     OPEN REDUCTION INTERNAL FIXATION PROXIMAL HUMERUS, RIGHT; DOS 5/6/22       History: The patient is here for follow-up regarding his right shoulder. He is now 1 week status post open reduction and internal fixation of the right shoulder. He is having mild pain. He denies any numbness or tingling. The patient's  past medical history, medications, allergies,  family history, social history, and have been reviewed, and dated and are recorded in the chart. Pertinent items are noted in HPI. Review of systems reviewed from Pertinent History Form dated on 5/13/22 and available in the patient's chart under the Media tab. Vitals:  Ht 6' 5\" (1.956 m)   Wt 251 lb (113.9 kg)   BMI 29.76 kg/m²     Physical: On examination today, the patient is alert and oriented x3. The incision is clean and dry. There is no sign of infection. He is neurovascularly intact distally. He extends his right elbow to approximately 5 degrees short of full extension. He flexes the right elbow to 130 degrees. He flexes and extends the right wrist without difficulty. X-rays: 3 views of the right shoulder obtained in the office today were extensively reviewed. The hardware is in good position. The fracture is well aligned. There has been no further displacement. Impression: Status post open reduction and internal fixation of right proximal humerus fracture    Plan: At this time, we will continue our current treatment. The patient will ice the right shoulder is much as possible. We will continue with the Prineo. He was given a refill on the Percocet. He will continue to work on range of motion of the right elbow, wrist and hand. We instructed him on Codman exercises. He will follow-up with me on Tuesday and we will reassess him then.

## 2022-05-17 ENCOUNTER — OFFICE VISIT (OUTPATIENT)
Dept: ORTHOPEDIC SURGERY | Age: 57
End: 2022-05-17

## 2022-05-17 VITALS — BODY MASS INDEX: 29.64 KG/M2 | WEIGHT: 251 LBS | HEIGHT: 77 IN

## 2022-05-17 DIAGNOSIS — S42.201A CLOSED FRACTURE OF PROXIMAL END OF RIGHT HUMERUS, UNSPECIFIED FRACTURE MORPHOLOGY, INITIAL ENCOUNTER: Primary | ICD-10-CM

## 2022-05-17 DIAGNOSIS — Z98.890 S/P ORIF (OPEN REDUCTION INTERNAL FIXATION) FRACTURE: ICD-10-CM

## 2022-05-17 DIAGNOSIS — Z87.81 S/P ORIF (OPEN REDUCTION INTERNAL FIXATION) FRACTURE: ICD-10-CM

## 2022-05-17 PROCEDURE — 99024 POSTOP FOLLOW-UP VISIT: CPT | Performed by: ORTHOPAEDIC SURGERY

## 2022-05-17 NOTE — PROGRESS NOTES
Jennie Lazaro  [id number]  May 17, 2022    Chief Complaint   Patient presents with    Post-Op Check     OPEN REDUCTION INTERNAL FIXATION PROXIMAL HUMERUS, RIGHT; DOS 5/6/22       History: The patient is here for follow-up regarding his right shoulder. He is now 11 days status post open reduction and internal fixation of the right shoulder. He is having mild pain. He denies any numbness or tingling. The patient's  past medical history, medications, allergies,  family history, social history, and have been reviewed, and dated and are recorded in the chart. Pertinent items are noted in HPI. Review of systems reviewed from Pertinent History Form dated on 5/13/22 and available in the patient's chart under the Media tab. Vitals:  Ht 6' 5\" (1.956 m)   Wt 251 lb (113.9 kg)   BMI 29.76 kg/m²     Physical: On examination today, the patient is alert and oriented x3. The incision is clean and dry. There is no sign of infection. He is neurovascularly intact distally. He extends his right elbow to approximately 3 degrees short of full extension. He flexes the right elbow to 140 degrees. He flexes and extends the right wrist without difficulty. We abducted the right shoulder to approximately 70 degrees without much pain. X-rays: 3 views of the right shoulder obtained in the office at last visit were extensively reviewed. The hardware is in good position. The fracture is well aligned. There has been no further displacement. Impression: Status post open reduction and internal fixation of right proximal humerus fracture    Plan: At this time, we will continue our current treatment. Today, the Prineo was removed and Steri-Strips were applied. He will continue to ice the right shoulder is much as possible. He will sleep in a reclined position. The patient will begin a formal therapy program next week. He will work on range of motion of his right elbow wrist and hand.   He will also work on light range of motion of the right shoulder. The patient will follow up with me in approximately 3 weeks and we will reassess him then. At follow-up, 3 views of the right shoulder will be obtained.

## 2022-05-26 ENCOUNTER — HOSPITAL ENCOUNTER (OUTPATIENT)
Dept: PHYSICAL THERAPY | Age: 57
Setting detail: THERAPIES SERIES
Discharge: HOME | End: 2022-05-26
Payer: COMMERCIAL

## 2022-05-26 PROCEDURE — 97110 THERAPEUTIC EXERCISES: CPT | Performed by: PHYSICAL THERAPIST

## 2022-05-26 PROCEDURE — 97161 PT EVAL LOW COMPLEX 20 MIN: CPT | Performed by: PHYSICAL THERAPIST

## 2022-05-26 PROCEDURE — 97530 THERAPEUTIC ACTIVITIES: CPT | Performed by: PHYSICAL THERAPIST

## 2022-05-26 NOTE — FLOWSHEET NOTE
[facility]   Phone: [phone]    Fax: [phone]      Physical Therapy Treatment Note/ Progress Report:         Date:  2022    Patient Name:  Juan M Vazquez    :  1965  MRN: [id number]  Restrictions/Precautions: prox humeral fx 22  Medical/Treatment Diagnosis Information:  · Diagnosis: S42.201A (ICD-10-CM) - Closed fracture of proximal end of right humerus, unspecified fracture morphology, initial encounter; Z98.890, Z87.81 (ICD-10-CM) - S/P ORIF (open reduction internal fixation) fracture      DOS: 22       OPEN REDUCTION INTERNAL FIXATION PROXIMAL HUMERUS, RIGHT   · Treating Diagnosis: dec ROM in R UE, weakness in R UE, pain in R shoulder     Insurance/Certification information:  PT Insurance Information: R  Physician Information:   Dr. Nara Victor  Has the plan of care been signed (Y/N):        []  Yes  [x]  No     Date of Patient follow up with Physician:       Is this a Progress Report:     []  Yes  [x]  No        If Yes:  Date Range for reporting period:  Beginning 22  Ending 22    Progress report will be due (10 Rx or 30 days whichever is less):        Recertification will be due (POC Duration  / 90 days whichever is less): 22         Visit # Insurance Allowable Auth Required   1 60 []  Yes [x]  No        Functional Scale: FOTO 37, DASH 77    Date assessed:  22      Latex Allergy:  [x]NO      []YES  Preferred Language for Healthcare:   [x]English       []other:    Pain level:  3-4/10     SUBJECTIVE:  See eval    Occupation/School: ; [employer];     Living Status/Prior Level of Function: Independent with ADLs and IADLs, unlimited prior to injury in R shoulder;    OBJECTIVE: See eval   Observation:             ROM PROM AROM  Comment     L R L R 2022   Flexion   48 deg w/ table slides         Abduction   68 deg w/ table slides         ER   ~15 deg w/cane         IR           Elbow ext   -12 deg         Other                 5/26/22 deferred due to surg  Strength L R Comment   Flexion     5/26/2022   Abduction         ER         IR         Supraspinatus         Upper Trap         Lower Trap         Mid Trap         Rhomboids         Biceps         Triceps         Horizontal Abduction         Horizontal Adduction         Lats                       Test measurements:      RESTRICTIONS/PRECAUTIONS: Prox humeral fracture on 5/3/22 w surg on 5/6/22- gentle motion of R shoulder    Exercises/Interventions:   Exercises:  Exercise/Equipment Resistance/Repetitions Comments   Cardio/Warm-up     Bike          Stretching/PROM     Wand [employer] 10\"x10    Table Slides Flex and scaption 10\"x10    UE S [place]     Pulleys     Pendulum      Elbow ext 10\"x10         STRENGTH     Isometrics     Retraction          Weight shift     Flexion     Abduction     External Rotation     Internal Rotation     Biceps     Triceps          PRE's     Wrist flex and ext 1# x15 ea    Flexion     Abduction     External Rotation     Internal Rotation     Shrugs x15 In front of mirror   EXT     Reverse Flys     Serratus     Horizontal Abd with ER     Biceps x15    Triceps     Retraction Standing x15         Cable Column/Theraband     External Rotation     Internal Rotation     Shrugs     Lats     Ext     Flex     Scapular Retraction     BIC     TRIC     PNF          Neuromuscular Re-ed     Dynamic Stability                              Ed given on diagnosis, expectations & goals X10'; ed on scar massage                   Manual/Modalities                       Therapeutic Exercise and NMR EXR  [x] (40141) Provided verbal/tactile cueing for activities related to strengthening, flexibility, endurance, ROM  for improvements in scapular, scapulothoracic and UE control with self care, reaching, carrying, lifting, house/yardwork, driving/computer work.    [] (43580) Provided verbal/tactile cueing for activities related to improving care, reaching, carrying, lifting, house/yardwork, driving/computer work      Manual Treatments:  PROM / STM / Oscillations-Mobs:  G-I, II, III, IV (PA's, Inf., Post.)  [] (46608) Provided manual therapy to mobilize soft tissue/joints of cervical/CT, scapular GHJ and UE for the purpose of modulating pain, promoting relaxation,  increasing ROM, reducing/eliminating soft tissue swelling/inflammation/restriction, improving soft tissue extensibility and allowing for proper ROM for normal function with self care, reaching, carrying, lifting, house/yardwork, driving/computer work    Modalities:  CP x10 min to shoulder   [] GAME READY (VASO)- for significant edema, swelling, pain control. Charges:  Timed Code Treatment Minutes: 20   Total Treatment Minutes: 60     [x] EVAL (LOW) 12434 (typically 20 minutes face-to-face)  [] EVAL (MOD) 77303 (typically 30 minutes face-to-face)  [] EVAL (HIGH) 46518 (typically 45 minutes face-to-face)  [] RE-EVAL   [x] GL(41849) 1 x15'     [] IONTO  [] NMR (74226) x     [] VASO  [] Manual (03188) x      [] Other:  [x] TA (25523) 1 x  15'    [] Mech Traction (73213)  [] ES(attended) (49158)      [] ES (un) (93917):     GOALS:  Patient stated goal: regain full strength and function in R shoulder  [] Progressing: [] Met: [] Not Met: [] Adjusted    Therapist goals for Patient:   Short Term Goals: To be achieved in: 2 weeks  1. Independent in HEP and progression per patient tolerance, in order to prevent re-injury. [] Progressing: [] Met: [] Not Met: [] Adjusted  2. Patient will have a decrease in pain to facilitate improvement in movement, function, and ADLs as indicated by Functional Deficits. [] Progressing: [] Met: [] Not Met: [] Adjusted    Long Term Goals: To be achieved in: 12 weeks  1. Disability index score of 67 % or less for the FOTO to assist with reaching prior level of function. [] Progressing: [] Met: [] Not Met: [] Adjusted  2.  Patient will demonstrate increased AROM to 170+ elevation, 90 ER, and reach mid back to allow for proper joint functioning as indicated by patients Functional Deficits. [] Progressing: [] Met: [] Not Met: [] Adjusted  3. Patient will demonstrate an increase in Strength to 5/5 to allow for proper functional mobility as indicated by patients Functional Deficits. [] Progressing: [] Met: [] Not Met: [] Adjusted  4. Patient will return to 90% functional activities without increased symptoms or restriction. [] Progressing: [] Met: [] Not Met: [] Adjusted  5. Pt will be able to return to playing siddhartha ball w/o restrictions.(patient specific functional goal)    [] Progressing: [] Met: [] Not Met: [] Adjusted    Overall Progression Towards Functional goals/ Treatment Progress Update:  [] Patient is progressing as expected towards functional goals listed. [] Progression is slowed due to complexities/Impairments listed. [] Progression has been slowed due to co-morbidities. [x] Plan just implemented, too soon to assess goals progression <30days   [] Goals require adjustment due to lack of progress  [] Patient is not progressing as expected and requires additional follow up with physician  [] Other    ASSESSMENT:  See eval    Treatment/Activity Tolerance:  [] Patient tolerated treatment well [] Patient limited by fatique  [] Patient limited by pain  [] Patient limited by other medical complications  [] Other:     Prognosis: [] Good [] Fair  [] Poor    Patient Requires Follow-up: [x] Yes  [] No    PLAN: See eval  [x] Continue per plan of care [] Alter current plan (see comments)  [x] Plan of care initiated [] Hold pending MD visit [] Discharge    Electronically signed by: Darylene Hopkins, PT MS, OMT-C      Physical Therapist [place] license #[id number]  Physical Therapist [place] license #[id number]     Note: If patient does not return for scheduled/ recommended follow up visits, this note will serve as a discharge from care along with most recent update on progress.

## 2022-06-02 ENCOUNTER — HOSPITAL ENCOUNTER (OUTPATIENT)
Dept: PHYSICAL THERAPY | Age: 57
Setting detail: THERAPIES SERIES
Discharge: HOME | End: 2022-06-02
Payer: COMMERCIAL

## 2022-06-02 PROCEDURE — 97530 THERAPEUTIC ACTIVITIES: CPT

## 2022-06-02 PROCEDURE — 97110 THERAPEUTIC EXERCISES: CPT

## 2022-06-02 NOTE — FLOWSHEET NOTE
[facility]   Phone: [phone]    Fax: [phone]      Physical Therapy Treatment Note/ Progress Report:         Date:  2022    Patient Name:  Whit Manriquez    :  1965  MRN: [id number]  Restrictions/Precautions: prox humeral fx 22  Medical/Treatment Diagnosis Information:  · Diagnosis: S42.201A (ICD-10-CM) - Closed fracture of proximal end of right humerus, unspecified fracture morphology, initial encounter; Z98.890, Z87.81 (ICD-10-CM) - S/P ORIF (open reduction internal fixation) fracture      DOS: 22       OPEN REDUCTION INTERNAL FIXATION PROXIMAL HUMERUS, RIGHT   · Treating Diagnosis: dec ROM in R UE, weakness in R UE, pain in R shoulder     Insurance/Certification information:  PT Insurance Information: [facility]  Physician Information:   Dr. Violet Us  Has the plan of care been signed (Y/N):        []  Yes  [x]  No     Date of Patient follow up with Physician:       Is this a Progress Report:     []  Yes  [x]  No        If Yes:  Date Range for reporting period:  Beginning 22  Ending 22    Progress report will be due (10 Rx or 30 days whichever is less): 3/71/00       Recertification will be due (POC Duration  / 90 days whichever is less): 22         Visit # Insurance Allowable Auth Required   2 60 []  Yes [x]  No        Functional Scale: FOTO 37, DASH 77    Date assessed:  22      Latex Allergy:  [x]NO      []YES  Preferred Language for Healthcare:   [x]English       []other:    Pain level:  2/10     SUBJECTIVE: Pt notes he is doing alright, he has some soreness in shoulder like a dull ache. He has been doing exercises and trying to push his motion more and more without too much pain.      Occupation/School: ; [employer];     Living Status/Prior Level of Function: Independent with ADLs and IADLs, unlimited prior to injury in R shoulder;    OBJECTIVE: See eval   Observation:             ROM PROM AROM improvements in scapular, scapulothoracic and UE control with self care, reaching, carrying, lifting, house/yardwork, driving/computer work.    [] (80407) Provided verbal/tactile cueing for activities related to improving balance, coordination, kinesthetic sense, posture, motor skill, proprioception  to assist with  scapular, scapulothoracic and UE control with self care, reaching, carrying, lifting, house/yardwork, driving/computer work. Therapeutic Activities:    [] (37610 or 31699) Provided verbal/tactile cueing for activities related to improving balance, coordination, kinesthetic sense, posture, motor skill, proprioception and motor activation to allow for proper function of scapular, scapulothoracic and UE control with self care, carrying, lifting, driving/computer work. Home Exercise Program:    [x] (87816) Reviewed/Progressed HEP activities related to strengthening, flexibility, endurance, ROM of scapular, scapulothoracic and UE control with self care, reaching, carrying, lifting, house/yardwork, driving/computer work     Written HEP est    Access Code: [email or web address]  URL: [email or web address]  Date: 06/02/2022  Prepared by: Doris Mcclure    Exercises  Seated Shoulder Flexion Towel Slide at Table Top - 3 x daily - 7 x weekly - 1 sets - 10 reps - 10 hold  Seated Shoulder Abduction Towel Slide at Table Top - 3 x daily - 7 x weekly - 1 sets - 10 reps - 10 hold  Seated Shoulder External Rotation AAROM with Cane and Hand in Neutral - 3 x daily - 7 x weekly - 1 sets - 10 reps - 10 hold  Standing Shoulder Internal Rotation AAROM Behind Back with Towel - 3 x daily - 7 x weekly - 1 sets - 10 reps - 10 hold  Elbow Extension PROM - 3 x daily - 7 x weekly - 1 sets - 10 reps - 10 hold  Standing Elbow Flexion Extension AROM - 1-2 x daily - 7 x weekly - 3 sets - 10 reps  Seated Scapular Retraction - 1-2 x daily - 7 x weekly - 3 sets - 10 reps  Seated Shoulder Shrugs - 1-2 x daily - 7 x weekly - 3 sets - 10 reps  Seated Wrist Flexion with Dumbbell - 1-2 x daily - 7 x weekly - 3 sets - 10 reps  Seated Wrist Extension with Dumbbell - 1-2 x daily - 7 x weekly - 3 sets - 10 reps        [] (11085) Reviewed/Progressed HEP activities related to improving balance, coordination, kinesthetic sense, posture, motor skill, proprioception of scapular, scapulothoracic and UE control with self care, reaching, carrying, lifting, house/yardwork, driving/computer work      Manual Treatments:  PROM / STM / Oscillations-Mobs:  G-I, II, III, IV (PA's, Inf., Post.)  [] (51242) Provided manual therapy to mobilize soft tissue/joints of cervical/CT, scapular GHJ and UE for the purpose of modulating pain, promoting relaxation,  increasing ROM, reducing/eliminating soft tissue swelling/inflammation/restriction, improving soft tissue extensibility and allowing for proper ROM for normal function with self care, reaching, carrying, lifting, house/yardwork, driving/computer work    Modalities:  CP x10 min to shoulder   [] GAME READY (VASO)- for significant edema, swelling, pain control. Charges:  Timed Code Treatment Minutes: 33   Total Treatment Minutes: 35     [] EVAL (LOW) 82992 (typically 20 minutes face-to-face)  [] EVAL (MOD) 20921 (typically 30 minutes face-to-face)  [] EVAL (HIGH) 41158 (typically 45 minutes face-to-face)  [] RE-EVAL   [x] TI(74650) x 1  [] IONTO  [] NMR (42073) x     [] VASO  [] Manual (81121) x      [] Other:  [x] TA (77306) x 1   [] [facility] (03988)  [] ES(attended) (08945)      [] ES (un) (19218):     GOALS:  Patient stated goal: regain full strength and function in R shoulder  [] Progressing: [] Met: [] Not Met: [] Adjusted    Therapist goals for Patient:   Short Term Goals: To be achieved in: 2 weeks  1. Independent in HEP and progression per patient tolerance, in order to prevent re-injury. [] Progressing: [] Met: [] Not Met: [] Adjusted  2.  Patient will have a decrease in pain to facilitate improvement in movement, function, and ADLs as indicated by Functional Deficits. [] Progressing: [] Met: [] Not Met: [] Adjusted    Long Term Goals: To be achieved in: 12 weeks  1. Disability index score of 67 % or less for the FOTO to assist with reaching prior level of function. [] Progressing: [] Met: [] Not Met: [] Adjusted  2. Patient will demonstrate increased AROM to 170+ elevation, 90 ER, and reach mid back to allow for proper joint functioning as indicated by patients Functional Deficits. [] Progressing: [] Met: [] Not Met: [] Adjusted  3. Patient will demonstrate an increase in Strength to 5/5 to allow for proper functional mobility as indicated by patients Functional Deficits. [] Progressing: [] Met: [] Not Met: [] Adjusted  4. Patient will return to 90% functional activities without increased symptoms or restriction. [] Progressing: [] Met: [] Not Met: [] Adjusted  5. Pt will be able to return to playing siddhartha ball w/o restrictions.(patient specific functional goal)    [] Progressing: [] Met: [] Not Met: [] Adjusted    Overall Progression Towards Functional goals/ Treatment Progress Update:  [] Patient is progressing as expected towards functional goals listed. [] Progression is slowed due to complexities/Impairments listed. [] Progression has been slowed due to co-morbidities. [x] Plan just implemented, too soon to assess goals progression <30days   [] Goals require adjustment due to lack of progress  [] Patient is not progressing as expected and requires additional follow up with physician  [] Other    ASSESSMENT:  See eval    Treatment/Activity Tolerance:  [] Patient tolerated treatment well [] Patient limited by fatique  [] Patient limited by pain  [] Patient limited by other medical complications  [] Other: pt shows improved ROM in all planes of shoulder and into elbow ext. He was able to complete light muscle activation activities again today.  Pt sees MD prior to next session, progress POC as advised. Prognosis: [] Good [] Fair  [] Poor    Patient Requires Follow-up: [x] Yes  [] No    PLAN: See eval  [x] Continue per plan of care [] Alter current plan (see comments)  [x] Plan of care initiated [] Hold pending MD visit [] Discharge    Electronically signed by: Chelita Beltran PT   Chelita Beltran PT, DPT, Cert DN      Note: If patient does not return for scheduled/ recommended follow up visits, this note will serve as a discharge from care along with most recent update on progress.

## 2022-06-08 ENCOUNTER — OFFICE VISIT (OUTPATIENT)
Dept: ORTHOPEDIC SURGERY | Age: 57
End: 2022-06-08

## 2022-06-08 VITALS — WEIGHT: 248 LBS | HEIGHT: 77 IN | BODY MASS INDEX: 29.28 KG/M2

## 2022-06-08 DIAGNOSIS — Z87.81 S/P ORIF (OPEN REDUCTION INTERNAL FIXATION) FRACTURE: Primary | ICD-10-CM

## 2022-06-08 DIAGNOSIS — Z98.890 S/P ORIF (OPEN REDUCTION INTERNAL FIXATION) FRACTURE: Primary | ICD-10-CM

## 2022-06-08 PROCEDURE — 99024 POSTOP FOLLOW-UP VISIT: CPT | Performed by: ORTHOPAEDIC SURGERY

## 2022-06-08 NOTE — LETTER
[facility]  [facility] [address] [address]  Phone: [phone]  Fax: [phone]    Fatou Mcarthur MD        June 8, 2022     Patient: Yovani Ko   YOB: 1965   Date of Visit: 6/8/2022       To Whom It May Concern: It is my medical opinion that Jaime Garcia may return to work on 6/20/22 to full duty with no restrictions. If you have any questions or concerns, please don't hesitate to call.     Sincerely,          Fatou Mcarthur MD

## 2022-06-08 NOTE — PROGRESS NOTES
Lisa Snow  [id number]  June 8, 2022    Chief Complaint   Patient presents with    Post-Op Check     OPEN REDUCTION INTERNAL FIXATION PROXIMAL HUMERUS, RIGHT; DOS 5/6/22       History: The patient is here for follow-up regarding his right shoulder. He is now 5 weeks status post open reduction and internal fixation of the right proximal humerus. He is having mild pain. He denies any numbness or tingling. He is participating in physical therapy. The patient's  past medical history, medications, allergies,  family history, social history, and have been reviewed, and dated and are recorded in the chart. Pertinent items are noted in HPI. Review of systems reviewed from Pertinent History Form dated on 5/13/22 and available in the patient's chart under the Media tab. Vitals:  Ht 6' 5\" (1.956 m)   Wt 248 lb (112.5 kg)   BMI 29.41 kg/m²     Physical: On examination today, the patient is alert and oriented x 3. The incision is clean and dry. There is no sign of infection. He is neurovascularly intact distally. He extends his right elbow to approximately 0 degrees short of full extension. He flexes the right elbow to 150 degrees. He flexes and extends the right wrist without difficulty. We abducted the right shoulder to approximately 100 degrees without much pain. X-rays: 3 views of the right shoulder obtained in the office at last visit were extensively reviewed. The hardware is in good position. The fracture is well aligned. There has been no further displacement. There has been a significant amount of callus formation. Impression: Status post open reduction and internal fixation of right proximal humerus fracture    Plan: At this time, we will continue our current treatment. We will progress the patient in therapy. He will continue with aggressive passive range of motion. He will begin active and active assisted range of motion. He will begin strengthening in 3 weeks.   The patient may return to work on 20 June. He will follow-up with me in approximately 4 weeks and we will reassess him then. At follow-up, 3 views of the right shoulder will be obtained. The patient works in IT at [employer].

## 2022-06-09 ENCOUNTER — HOSPITAL ENCOUNTER (OUTPATIENT)
Dept: PHYSICAL THERAPY | Age: 57
Setting detail: THERAPIES SERIES
Discharge: HOME | End: 2022-06-09
Payer: COMMERCIAL

## 2022-06-09 PROCEDURE — 97530 THERAPEUTIC ACTIVITIES: CPT

## 2022-06-09 PROCEDURE — 97110 THERAPEUTIC EXERCISES: CPT

## 2022-06-09 NOTE — FLOWSHEET NOTE
[address] [facility], [facility]    Physical Therapy Daily Treatment Note      Date:  2022    Patient Name:  July Bergman    :  1965  MRN: [id number]  Restrictions/Precautions: prox humeral fx 22  Medical/Treatment Diagnosis Information:  · Diagnosis: S42.201A (ICD-10-CM) - Closed fracture of proximal end of right humerus, unspecified fracture morphology, initial encounter; Z98.890, Z87.81 (ICD-10-CM) - S/P ORIF (open reduction internal fixation) fracture      DOS: 22       OPEN REDUCTION INTERNAL FIXATION PROXIMAL HUMERUS, RIGHT   · Treating Diagnosis: dec ROM in R UE, weakness in R UE, pain in R shoulder     Insurance/Certification information:  PT Insurance Information: R  Physician Information:   Dr. Mallory Cisneros  Has the plan of care been signed (Y/N):        []  Yes  [x]  No     Date of Patient follow up with Physician:       Is this a Progress Report:     []  Yes  [x]  No        If Yes:  Date Range for reporting period:  Beginning 22  Ending 22    Progress report will be due (10 Rx or 30 days whichever is less): 36       Recertification will be due (POC Duration  / 90 days whichever is less): 22         Visit # Insurance Allowable Auth Required   3 60 []  Yes [x]  No        Functional Scale: FOTO 37, DASH 77    Date assessed:  22      Latex Allergy:  [x]NO      []YES  Preferred Language for Healthcare:   [x]English       []other:    Pain level:  1/10     SUBJECTIVE: Reports he is doing very well. No pain at rest. Gets a little achy at night and fatigued during the day. Saw Dr. Mallory Cisneros yesterday and is pleased with progress and can now discontinue the sling.       Occupation/School: ; [employer];     Living Status/Prior Level of Function: Independent with ADLs and IADLs, unlimited prior to injury in R shoulder;     OBJECTIVE:     22  ROM PROM AROM  Comment     L R      L R    Flexion   110 deg w/ table slides         Abduction   103 deg w/ table slides         ER    48 deg w/cane at side seated    44 at 45 deg ABD          IR    mid R buttock with cane behind back          Elbow ext   Lacking 4          Other                 5/26/22 deferred due to surg  Strength L R Comment   Flexion     5/26/2022   Abduction         ER         IR         Supraspinatus         Upper Trap         Lower Trap         Mid Trap         Rhomboids         Biceps         Triceps         Horizontal Abduction         Horizontal Adduction         Lats              RESTRICTIONS/PRECAUTIONS: Prox humeral fracture on 5/3/22 w surg on 5/6/22- Aggressive PROM; begin strengthening ~6/29/22  Exercises/Interventions:   Exercise/Equipment Resistance/Repetitions Comments   Cardio/Warm-up     Bike          Stretching/PROM     Juan [email or web address] 10\"x10 seated with elbow on table   10x10\" at 45°    Table Slides Flex and scaption 10\"x10    IR behind back  10x10 cane - gentle     UE [place]     Pulleys 10x10\" Flexion    Pendulum      Elbow ext 10\"x10  Propped     Wrist flex/ext           STRENGTH     Isometrics     Retraction          Weight shift     Flexion     Abduction     External Rotation     Internal Rotation     Biceps     Triceps          PRE's     Wrist flex and ext 2# 3x10 ea    Flexion     Abduction     External Rotation     Internal Rotation     Shrugs 3x10 In front of mirror   EXT     Reverse Flys     Serratus     Horizontal Abd with ER     Biceps 3x10; 2#    Triceps     Retraction 3x10          Cable Column/Theraband     External Rotation     Internal Rotation     Shrugs     Lats     Ext     Flex     Scapular Retraction     BIC     TRIC     PNF          Neuromuscular Re-ed     Dynamic Stability                                                  Manual/Modalities                       Therapeutic Exercise and NMR EXR  [x] [phone]) Provided verbal/tactile cueing for activities related to strengthening, flexibility, endurance, ROM  for improvements in scapular, scapulothoracic and UE control with self care, reaching, carrying, lifting, house/yardwork, driving/computer work.    [] (84100) Provided verbal/tactile cueing for activities related to improving balance, coordination, kinesthetic sense, posture, motor skill, proprioception  to assist with  scapular, scapulothoracic and UE control with self care, reaching, carrying, lifting, house/yardwork, driving/computer work. Therapeutic Activities:    [x] (05896 or 21214) Provided verbal/tactile cueing for activities related to improving balance, coordination, kinesthetic sense, posture, motor skill, proprioception and motor activation to allow for proper function of scapular, scapulothoracic and UE control with self care, carrying, lifting, driving/computer work. Home Exercise Program:    [x] (30108) Reviewed/Progressed HEP activities related to strengthening, flexibility, endurance, ROM of scapular, scapulothoracic and UE control with self care, reaching, carrying, lifting, house/yardwork, driving/computer work     Written HEP est    Access Code: [email or web address]  URL: [email or web address]  Date: 06/09/2022  Prepared by:  [employer]    Exercises  Seated Shoulder Flexion Towel Slide at Table Top - 3 x daily - 7 x weekly - 1 sets - 10 reps - 10 hold  Seated Shoulder Abduction Towel Slide at Table Top - 3 x daily - 7 x weekly - 1 sets - 10 reps - 10 hold  Seated Shoulder External Rotation AAROM with Cane and Hand in Neutral - 3 x daily - 7 x weekly - 1 sets - 10 reps - 10 hold  Supine Shoulder External Rotation in 45 Degrees Abduction AAROM with Dowel - 3 x daily - 7 x weekly - 10 reps - 10 hold  Standing Shoulder Internal Rotation AAROM Behind Back with Towel - 3 x daily - 7 x weekly - 1 sets - 10 reps - 10 hold  Seated Scapular Retraction - 1-2 x daily - 7 x weekly - 3 sets - 10 reps  Seated Shoulder Shrugs - 1-2 x daily - 7 x weekly - 3 sets - 10 reps  Seated Wrist Flexion with Dumbbell - 1-2 x daily - 7 x weekly - 3 sets - 10 reps  Seated Wrist Extension with Dumbbell - 1-2 x daily - 7 x weekly - 3 sets - 10 reps  Seated Bicep Curls Supinated with Dumbbells - 1-2 x daily - 7 x weekly - 3 sets - 10 reps          [x] (69960) Reviewed/Progressed HEP activities related to improving balance, coordination, kinesthetic sense, posture, motor skill, proprioception of scapular, scapulothoracic and UE control with self care, reaching, carrying, lifting, house/yardwork, driving/computer work      Manual Treatments:  PROM / STM / Oscillations-Mobs:  G-I, II, III, IV (PA's, Inf., Post.)  [x] (75044) Provided manual therapy to mobilize soft tissue/joints of cervical/CT, scapular GHJ and UE for the purpose of modulating pain, promoting relaxation,  increasing ROM, reducing/eliminating soft tissue swelling/inflammation/restriction, improving soft tissue extensibility and allowing for proper ROM for normal function with self care, reaching, carrying, lifting, house/yardwork, driving/computer work    Modalities:  CP x10 min to shoulder   [] GAME READY (VASO)- for significant edema, swelling, pain control. Charges:  Timed Code Treatment Minutes: 40   Total Treatment Minutes: 50     [] EVAL (LOW) 35891 (typically 20 minutes face-to-face)  [] EVAL (MOD) 93988 (typically 30 minutes face-to-face)  [] EVAL (HIGH) 84201 (typically 45 minutes face-to-face)  [] RE-EVAL   [x] XW(49561) x 2  [] IONTO  [] NMR (93065) x     [] VASO  [] Manual (65552) x      [] Other:  [x] TA (01899) x 1   [] [facility] Traction (38847)  [] ES(attended) (10961)      [] ES (un) (40717):     GOALS:  Patient stated goal: regain full strength and function in R shoulder  [] Progressing: [] Met: [] Not Met: [] Adjusted    Therapist goals for Patient:   Short Term Goals: To be achieved in: 2 weeks  1. Independent in HEP and progression per patient tolerance, in order to prevent re-injury. [] Progressing: [] Met: [] Not Met: [] Adjusted  2.  Patient will have a decrease in pain to facilitate improvement in movement, function, and ADLs as indicated by Functional Deficits. [] Progressing: [] Met: [] Not Met: [] Adjusted    Long Term Goals: To be achieved in: 12 weeks  1. Disability index score of 67 % or less for the FOTO to assist with reaching prior level of function. [] Progressing: [] Met: [] Not Met: [] Adjusted  2. Patient will demonstrate increased AROM to 170+ elevation, 90 ER, and reach mid back to allow for proper joint functioning as indicated by patients Functional Deficits. [] Progressing: [] Met: [] Not Met: [] Adjusted  3. Patient will demonstrate an increase in Strength to 5/5 to allow for proper functional mobility as indicated by patients Functional Deficits. [] Progressing: [] Met: [] Not Met: [] Adjusted  4. Patient will return to 90% functional activities without increased symptoms or restriction. [] Progressing: [] Met: [] Not Met: [] Adjusted  5. Pt will be able to return to playing siddhartha ball w/o restrictions.(patient specific functional goal)    [] Progressing: [] Met: [] Not Met: [] Adjusted    Overall Progression Towards Functional goals/ Treatment Progress Update:  [] Patient is progressing as expected towards functional goals listed. [] Progression is slowed due to complexities/Impairments listed. [] Progression has been slowed due to co-morbidities. [x] Plan just implemented, too soon to assess goals progression <30days   [] Goals require adjustment due to lack of progress  [] Patient is not progressing as expected and requires additional follow up with physician  [] Other    ASSESSMENT:      Treatment/Activity Tolerance:  [] Patient tolerated treatment well [] Patient limited by fatique  [] Patient limited by pain  [] Patient limited by other medical complications  [] Other: Tolerated visit well today. ROM improved in all planes with less pain/discomfort noted.  Instructed patient to increase frequency of stretching to 2-3x/day to focus on restoring full PROM in next month. Initiated AAROM with pulleys today which patient had difficulty with secondary to joint stiffness. Continue PT to improve joint mobility and ROM to allow patient to safely return to PLOF. Prognosis: [] Good [] Fair  [] Poor    Patient Requires Follow-up: [x] Yes  [] No    PLAN: See eval  [x] Continue per plan of care [] Alter current plan (see comments)  [x] Plan of care initiated [] Hold pending MD visit [] Discharge    Electronically signed by: Michell Leary, PT, DPT, OCS    Note: If patient does not return for scheduled/ recommended follow up visits, this note will serve as a discharge from care along with most recent update on progress.

## 2022-06-13 ENCOUNTER — HOSPITAL ENCOUNTER (OUTPATIENT)
Dept: PHYSICAL THERAPY | Age: 57
Setting detail: THERAPIES SERIES
Discharge: HOME | End: 2022-06-13
Payer: COMMERCIAL

## 2022-06-13 PROCEDURE — 97530 THERAPEUTIC ACTIVITIES: CPT

## 2022-06-13 PROCEDURE — 97110 THERAPEUTIC EXERCISES: CPT

## 2022-06-13 PROCEDURE — 97140 MANUAL THERAPY 1/> REGIONS: CPT

## 2022-06-13 NOTE — FLOWSHEET NOTE
[address] [facility], [place]    Physical Therapy Daily Treatment Note      Date:  2022    Patient Name:  Landon Heath    :  1965  MRN: [id number]  Restrictions/Precautions: prox humeral fx 22  Medical/Treatment Diagnosis Information:  · Diagnosis: S42.201A (ICD-10-CM) - Closed fracture of proximal end of right humerus, unspecified fracture morphology, initial encounter; Z98.890, Z87.81 (ICD-10-CM) - S/P ORIF (open reduction internal fixation) fracture      DOS: 22       OPEN REDUCTION INTERNAL FIXATION PROXIMAL HUMERUS, RIGHT   · Treating Diagnosis: dec ROM in R UE, weakness in R UE, pain in R shoulder     Insurance/Certification information:  PT Insurance Information: R  Physician Information:   Dr. Ozella Lombard  Has the plan of care been signed (Y/N):        []  Yes  [x]  No     Date of Patient follow up with Physician:       Is this a Progress Report:     []  Yes  [x]  No        If Yes:  Date Range for reporting period:  Beginning 22  Ending 22    Progress report will be due (10 Rx or 30 days whichever is less): 74       Recertification will be due (POC Duration  / 90 days whichever is less): 22         Visit # Insurance Allowable Auth Required   4 60 []  Yes [x]  No        Functional Scale: FOTO 37, DASH 77    Date assessed:  22      Latex Allergy:  [x]NO      []YES  Preferred Language for Healthcare:   [x]English       []other:    Pain level:  1/10     SUBJECTIVE: Doing well today - a little sore / achy. Has been using right arm more now that he is out of the sling so thinks this may be contributing to the soreness.       Occupation/School: ; [employer];     Living Status/Prior Level of Function: Independent with ADLs and IADLs, unlimited prior to injury in R shoulder;     OBJECTIVE:     22  ROM PROM AROM  Comment     L R      L R    Flexion   115 OH Cane         Abduction   103 deg w/ table slides         ER   48 deg w/cane at side seated    54 at 45 deg ABD          IR    mid R buttock with cane behind back          Elbow ext   Lacking 4          Other                 5/26/22 deferred due to surg  Strength L R Comment   Flexion     5/26/2022   Abduction         ER         IR         Supraspinatus         Upper Trap         Lower Trap         Mid Trap         Rhomboids         Biceps         Triceps         Horizontal Abduction         Horizontal Adduction         Lats              RESTRICTIONS/PRECAUTIONS: Prox humeral fracture on 5/3/22 w surg on 5/6/22- Aggressive PROM; begin strengthening ~6/29/22  Exercises/Interventions:   Exercise/Equipment Resistance/Repetitions Comments   Cardio/Warm-up     Bike          Stretching/PROM     Juan Tan@[employer] 10\"x10 seated with elbow on table   10x10\" at 45°    Table Slides Flex and scaption 10\"x10    IR behind back  10x10\" Belt up the back    [employer] Flexion 10x10\"    Pulleys 10x10\" Flexion, scaption    Wall slides 10x10\" Flexion       Wrist flex/ext           STRENGTH     Isometrics     Retraction          Weight shift     Flexion     Abduction     External Rotation     Internal Rotation     Biceps     Triceps          PRE's        Flexion     Abduction     External Rotation     Internal Rotation     Shrugs 3x10; 3# In front of mirror   EXT     Reverse Flys     Serratus     Horizontal Abd with ER     Biceps 3x10; 3#    Triceps             Cable Column/Theraband     External Rotation     Internal Rotation     Shrugs     Lats     Ext     Flex     Scapular Retraction 3x10 Blue    BIC     TRIC     PNF          Neuromuscular Re-ed     Dynamic Stability                                                  Manual/Modalities      PROM 8' [place], IR, ER               Therapeutic Exercise and NMR EXR  [x] (76740) Provided verbal/tactile cueing for activities related to strengthening, flexibility, endurance, ROM  for improvements in scapular, scapulothoracic and UE control with self care, reaching, carrying, lifting, house/yardwork, driving/computer work.    [] (18036) Provided verbal/tactile cueing for activities related to improving balance, coordination, kinesthetic sense, posture, motor skill, proprioception  to assist with  scapular, scapulothoracic and UE control with self care, reaching, carrying, lifting, house/yardwork, driving/computer work. Therapeutic Activities:    [x] (80957 or 78251) Provided verbal/tactile cueing for activities related to improving balance, coordination, kinesthetic sense, posture, motor skill, proprioception and motor activation to allow for proper function of scapular, scapulothoracic and UE control with self care, carrying, lifting, driving/computer work. Home Exercise Program:    [x] (43648) Reviewed/Progressed HEP activities related to strengthening, flexibility, endurance, ROM of scapular, scapulothoracic and UE control with self care, reaching, carrying, lifting, house/yardwork, driving/computer work     Written HEP est    Access Code: [email or web address]  URL: [email or web address]  Date: 06/09/2022  Prepared by:  [employer]    Exercises  Seated Shoulder Flexion Towel Slide at Table Top - 3 x daily - 7 x weekly - 1 sets - 10 reps - 10 hold  Seated Shoulder Abduction Towel Slide at Table Top - 3 x daily - 7 x weekly - 1 sets - 10 reps - 10 hold  Seated Shoulder External Rotation AAROM with Cane and Hand in Neutral - 3 x daily - 7 x weekly - 1 sets - 10 reps - 10 hold  Supine Shoulder External Rotation in 45 Degrees Abduction AAROM with Dowel - 3 x daily - 7 x weekly - 10 reps - 10 hold  Standing Shoulder Internal Rotation AAROM Behind Back with Towel - 3 x daily - 7 x weekly - 1 sets - 10 reps - 10 hold  Seated Scapular Retraction - 1-2 x daily - 7 x weekly - 3 sets - 10 reps  Seated Shoulder Shrugs - 1-2 x daily - 7 x weekly - 3 sets - 10 reps  Seated Wrist Flexion with Dumbbell - 1-2 x daily - 7 x weekly - 3 sets - 10 reps  Seated Wrist Extension with Dumbbell - 1-2 x daily - 7 x weekly - 3 sets - 10 reps  Seated Bicep Curls Supinated with Dumbbells - 1-2 x daily - 7 x weekly - 3 sets - 10 reps          [x] (09392) Reviewed/Progressed HEP activities related to improving balance, coordination, kinesthetic sense, posture, motor skill, proprioception of scapular, scapulothoracic and UE control with self care, reaching, carrying, lifting, house/yardwork, driving/computer work      Manual Treatments:  PROM / STM / Oscillations-Mobs:  G-I, II, III, IV (PA's, Inf., Post.)  [x] (78729) Provided manual therapy to mobilize soft tissue/joints of cervical/CT, scapular GHJ and UE for the purpose of modulating pain, promoting relaxation,  increasing ROM, reducing/eliminating soft tissue swelling/inflammation/restriction, improving soft tissue extensibility and allowing for proper ROM for normal function with self care, reaching, carrying, lifting, house/yardwork, driving/computer work    Modalities:  Declined   [] GAME READY (VASO)- for significant edema, swelling, pain control. Charges:  Timed Code Treatment Minutes: 45   Total Treatment Minutes: 45     [] EVAL (LOW) 21617 (typically 20 minutes face-to-face)  [] EVAL (MOD) 45173 (typically 30 minutes face-to-face)  [] EVAL (HIGH) 73214 (typically 45 minutes face-to-face)  [] RE-EVAL   [x] LX(32114) x 1  [] IONTO  [] NMR (67302) x     [] VASO  [x] Manual (10613) x 1     [] Other:  [x] TA (96906) x 1   [] [facility] Traction (00940)  [] ES(attended) (14136)      [] ES (un) (18233):     GOALS:  Patient stated goal: regain full strength and function in R shoulder  [] Progressing: [] Met: [] Not Met: [] Adjusted    Therapist goals for Patient:   Short Term Goals: To be achieved in: 2 weeks  1. Independent in HEP and progression per patient tolerance, in order to prevent re-injury. [] Progressing: [] Met: [] Not Met: [] Adjusted  2.  Patient will have a decrease in pain to facilitate improvement in movement, function, and ADLs as indicated by Functional Deficits. [] Progressing: [] Met: [] Not Met: [] Adjusted    Long Term Goals: To be achieved in: 12 weeks  1. Disability index score of 67 % or less for the FOTO to assist with reaching prior level of function. [] Progressing: [] Met: [] Not Met: [] Adjusted  2. Patient will demonstrate increased AROM to 170+ elevation, 90 ER, and reach mid back to allow for proper joint functioning as indicated by patients Functional Deficits. [] Progressing: [] Met: [] Not Met: [] Adjusted  3. Patient will demonstrate an increase in Strength to 5/5 to allow for proper functional mobility as indicated by patients Functional Deficits. [] Progressing: [] Met: [] Not Met: [] Adjusted  4. Patient will return to 90% functional activities without increased symptoms or restriction. [] Progressing: [] Met: [] Not Met: [] Adjusted  5. Pt will be able to return to playing siddhartha ball w/o restrictions.(patient specific functional goal)    [] Progressing: [] Met: [] Not Met: [] Adjusted    Overall Progression Towards Functional goals/ Treatment Progress Update:  [] Patient is progressing as expected towards functional goals listed. [] Progression is slowed due to complexities/Impairments listed. [] Progression has been slowed due to co-morbidities. [x] Plan just implemented, too soon to assess goals progression <30days   [] Goals require adjustment due to lack of progress  [] Patient is not progressing as expected and requires additional follow up with physician  [] Other    ASSESSMENT:      Treatment/Activity Tolerance:  [] Patient tolerated treatment well [] Patient limited by fatique  [] Patient limited by pain  [] Patient limited by other medical complications  [] Other: Tolerated visit well today. Continues to have tightness at end ranges of motion. Emphasized need to stretch 3x/day to help with tightness in joint.  Continue PT to improve joint mobility and ROM to allow patient to safely return to [facility]. Prognosis: [] Good [] Fair  [] Poor    Patient Requires Follow-up: [x] Yes  [] No    PLAN: See eval  [x] Continue per plan of care [] Alter current plan (see comments)  [x] Plan of care initiated [] Hold pending MD visit [] Discharge    Electronically signed by: Kimmie Elizalde, PT, DPT, OCS    Note: If patient does not return for scheduled/ recommended follow up visits, this note will serve as a discharge from care along with most recent update on progress.

## 2022-06-16 ENCOUNTER — HOSPITAL ENCOUNTER (OUTPATIENT)
Dept: PHYSICAL THERAPY | Age: 57
Setting detail: THERAPIES SERIES
Discharge: HOME | End: 2022-06-16
Payer: COMMERCIAL

## 2022-06-16 PROCEDURE — 97530 THERAPEUTIC ACTIVITIES: CPT

## 2022-06-16 PROCEDURE — 97110 THERAPEUTIC EXERCISES: CPT

## 2022-06-16 NOTE — FLOWSHEET NOTE
[address] [facility], [place]    Physical Therapy Daily Treatment Note      Date:  2022    Patient Name:  Jd Cyr    :  1965  MRN: [id number]  Restrictions/Precautions: prox humeral fx 22  Medical/Treatment Diagnosis Information:  · Diagnosis: S42.201A (ICD-10-CM) - Closed fracture of proximal end of right humerus, unspecified fracture morphology, initial encounter; Z98.890, Z87.81 (ICD-10-CM) - S/P ORIF (open reduction internal fixation) fracture      DOS: 22       OPEN REDUCTION INTERNAL FIXATION PROXIMAL HUMERUS, RIGHT   · Treating Diagnosis: dec ROM in R UE, weakness in R UE, pain in R shoulder     Insurance/Certification information:  PT Insurance Information: R  Physician Information:   Dr. Candance Long  Has the plan of care been signed (Y/N):        []  Yes  [x]  No     Date of Patient follow up with Physician:       Is this a Progress Report:     []  Yes  [x]  No        If Yes:  Date Range for reporting period:  Beginning 22  Ending 22    Progress report will be due (10 Rx or 30 days whichever is less):        Recertification will be due (POC Duration  / 90 days whichever is less): 22         Visit # Insurance Allowable Auth Required   5 60 []  Yes [x]  No        Functional Scale: FOTO 37, DASH 77    Date assessed:  22      Latex Allergy:  [x]NO      []YES  Preferred Language for Healthcare:   [x]English       []other:    Pain level:  1/10     SUBJECTIVE: Doing about the same today. Shoulder stays constantly \"achy\" and does not seem to change much before/after PT. Managing fine with tylenol - symptoms are never more than mild. Did try driving this week without issue.       Occupation/School: ; [employer];     Living Status/Prior Level of Function: Independent with ADLs and IADLs, unlimited prior to injury in R shoulder;     OBJECTIVE:     22  ROM PROM AROM  Comment     L R      L R    Flexion   115 OH Cane    118 table slides         Abduction   103 deg w/ table slides         ER   48 deg w/cane at side seated    70° at 45 deg ABD          IR    mid R buttock with cane behind back          Elbow ext   Lacking 4          Other                 5/26/22 deferred due to surg  Strength L R Comment   Flexion     5/26/2022   Abduction         ER         IR         Supraspinatus         Upper Trap         Lower Trap         Mid Trap         Rhomboids         Biceps         Triceps         Horizontal Abduction         Horizontal Adduction         Lats              RESTRICTIONS/PRECAUTIONS: Prox humeral fracture on 5/3/22 w surg on 5/6/22- Aggressive PROM; begin strengthening ~6/29/22  Exercises/Interventions:   Exercise/Equipment Resistance/Repetitions Comments   Cardio/Warm-up     Bike          Stretching/PROM     Wand   10x10\" at 45°    Table Slides Flex and scaption 10\"x10    IR behind back  10x10\" Belt up the back    [employer] Flexion 10x10\"    Pulleys 10x10\" Flexion, scaption    Wall slides 10x10\" Flexion       Wrist flex/ext           STRENGTH     Isometrics     Retraction          Weight shift     Flexion     Abduction     External Rotation     Internal Rotation     Biceps     Triceps          PRE's        Flexion     Abduction     External Rotation     Internal Rotation     Shrugs 3x10; 4# In front of mirror   EXT     Reverse Flys     Serratus     Horizontal Abd with ER     Biceps 3x10; 4#    Triceps             Cable Column/Theraband     External Rotation     Internal Rotation     Shrugs     Lats     Ext 3x10 Green    Flex     Scapular Retraction 3x10 Black    BIC     TRIC     PNF          Neuromuscular Re-ed     Dynamic Stability                                                  Manual/Modalities                  Therapeutic Exercise and NMR EXR  [x] [phone]) Provided verbal/tactile cueing for activities related to strengthening, flexibility, endurance, ROM  for improvements in scapular, scapulothoracic and UE control with self care, reaching, carrying, lifting, house/yardwork, driving/computer work.    [] (79463) Provided verbal/tactile cueing for activities related to improving balance, coordination, kinesthetic sense, posture, motor skill, proprioception  to assist with  scapular, scapulothoracic and UE control with self care, reaching, carrying, lifting, house/yardwork, driving/computer work. Therapeutic Activities:    [x] (72261 or 65763) Provided verbal/tactile cueing for activities related to improving balance, coordination, kinesthetic sense, posture, motor skill, proprioception and motor activation to allow for proper function of scapular, scapulothoracic and UE control with self care, carrying, lifting, driving/computer work. Home Exercise Program:    [x] (05226) Reviewed/Progressed HEP activities related to strengthening, flexibility, endurance, ROM of scapular, scapulothoracic and UE control with self care, reaching, carrying, lifting, house/yardwork, driving/computer work     Written HEP est    Access Code: [email or web address]  URL: [email or web address]  Date: 06/09/2022  Prepared by:  [employer]    Exercises  Seated Shoulder Flexion Towel Slide at Table Top - 3 x daily - 7 x weekly - 1 sets - 10 reps - 10 hold  Seated Shoulder Abduction Towel Slide at Table Top - 3 x daily - 7 x weekly - 1 sets - 10 reps - 10 hold  Seated Shoulder External Rotation AAROM with Cane and Hand in Neutral - 3 x daily - 7 x weekly - 1 sets - 10 reps - 10 hold  Supine Shoulder External Rotation in 45 Degrees Abduction AAROM with Dowel - 3 x daily - 7 x weekly - 10 reps - 10 hold  Standing Shoulder Internal Rotation AAROM Behind Back with Towel - 3 x daily - 7 x weekly - 1 sets - 10 reps - 10 hold  Seated Scapular Retraction - 1-2 x daily - 7 x weekly - 3 sets - 10 reps  Seated Shoulder Shrugs - 1-2 x daily - 7 x weekly - 3 sets - 10 reps  Seated Wrist Flexion with Dumbbell - 1-2 x daily - 7 x weekly - 3 improvement in movement, function, and ADLs as indicated by Functional Deficits. [] Progressing: [] Met: [] Not Met: [] Adjusted    Long Term Goals: To be achieved in: 12 weeks  1. Disability index score of 67 % or less for the FOTO to assist with reaching prior level of function. [] Progressing: [] Met: [] Not Met: [] Adjusted  2. Patient will demonstrate increased AROM to 170+ elevation, 90 ER, and reach mid back to allow for proper joint functioning as indicated by patients Functional Deficits. [] Progressing: [] Met: [] Not Met: [] Adjusted  3. Patient will demonstrate an increase in Strength to 5/5 to allow for proper functional mobility as indicated by patients Functional Deficits. [] Progressing: [] Met: [] Not Met: [] Adjusted  4. Patient will return to 90% functional activities without increased symptoms or restriction. [] Progressing: [] Met: [] Not Met: [] Adjusted  5. Pt will be able to return to playing siddhartha ball w/o restrictions.(patient specific functional goal)    [] Progressing: [] Met: [] Not Met: [] Adjusted    Overall Progression Towards Functional goals/ Treatment Progress Update:  [] Patient is progressing as expected towards functional goals listed. [] Progression is slowed due to complexities/Impairments listed. [] Progression has been slowed due to co-morbidities. [x] Plan just implemented, too soon to assess goals progression <30days   [] Goals require adjustment due to lack of progress  [] Patient is not progressing as expected and requires additional follow up with physician  [] Other    ASSESSMENT:      Treatment/Activity Tolerance:  [] Patient tolerated treatment well [] Patient limited by fatique  [] Patient limited by pain  [] Patient limited by other medical complications  [] Other: Tolerated visit well today. ROM improved in all planes showing improving stiffness in shoulder and compliance with HEP.  Did need cueing for posture and decrease trunk extension during shoulder extension exercise. Emphasized need to stretch 3x/day to help with tightness in joint. Continue PT to improve joint mobility and ROM to allow patient to safely return to PLOF. Prognosis: [] Good [] Fair  [] Poor    Patient Requires Follow-up: [x] Yes  [] No    PLAN: See eval  [x] Continue per plan of care [] Alter current plan (see comments)  [x] Plan of care initiated [] Hold pending MD visit [] Discharge    Electronically signed by: Clementine Granger, PT, DPT, OCS    Note: If patient does not return for scheduled/ recommended follow up visits, this note will serve as a discharge from care along with most recent update on progress.

## 2022-06-20 ENCOUNTER — HOSPITAL ENCOUNTER (OUTPATIENT)
Dept: PHYSICAL THERAPY | Age: 57
Setting detail: THERAPIES SERIES
Discharge: HOME | End: 2022-06-20
Payer: COMMERCIAL

## 2022-06-20 PROCEDURE — 97530 THERAPEUTIC ACTIVITIES: CPT

## 2022-06-20 PROCEDURE — 97110 THERAPEUTIC EXERCISES: CPT

## 2022-06-20 PROCEDURE — 97140 MANUAL THERAPY 1/> REGIONS: CPT

## 2022-06-20 NOTE — FLOWSHEET NOTE
[address] [facility], [place]    Physical Therapy Daily Treatment Note      Date:  2022    Patient Name:  Landon Heath    :  1965  MRN: [id number]  Restrictions/Precautions: prox humeral fx 22  Medical/Treatment Diagnosis Information:  · Diagnosis: S42.201A (ICD-10-CM) - Closed fracture of proximal end of right humerus, unspecified fracture morphology, initial encounter; Z98.890, Z87.81 (ICD-10-CM) - S/P ORIF (open reduction internal fixation) fracture      DOS: 22       OPEN REDUCTION INTERNAL FIXATION PROXIMAL HUMERUS, RIGHT   · Treating Diagnosis: dec ROM in R UE, weakness in R UE, pain in R shoulder     Insurance/Certification information:  PT Insurance Information: [facility]  Physician Information:   Dr. Ozella Lombard  Has the plan of care been signed (Y/N):        []  Yes  [x]  No     Date of Patient follow up with Physician:       Is this a Progress Report:     []  Yes  [x]  No        If Yes:  Date Range for reporting period:  Beginning 22  Ending 22    Progress report will be due (10 Rx or 30 days whichever is less):        Recertification will be due (POC Duration  / 90 days whichever is less): 22         Visit # Insurance Allowable Auth Required   6  []  Yes [x]  No        Functional Scale: FOTO 37, DASH 77    Date assessed:  22      Latex Allergy:  [x]NO      []YES  Preferred Language for Healthcare:   [x]English       []other:    Pain level:  1/10     SUBJECTIVE: Reports muscle soreness in R forearm after using R arm more often this past weekend. Reports mild \"achy\" feeling in R shoulder after last session. Also feels \"achy\" when using R shoulder at home.      Occupation/School: ; [employer];     Living Status/Prior Level of Function: Independent with ADLs and IADLs, unlimited prior to injury in R shoulder;     OBJECTIVE:     22  ROM PROM AROM  Comment     L R      L R    Flexion   130 OH Cane             Abduction   103 deg w/ table slides         ER   48 deg w/cane at side seated    60° at 45 deg ABD          IR    mid R buttock with cane behind back          Elbow ext   Lacking 4          Other                 5/26/22 deferred due to surg  Strength L R Comment   Flexion     5/26/2022   Abduction         ER         IR         Supraspinatus         Upper Trap         Lower Trap         Mid Trap         Rhomboids         Biceps         Triceps         Horizontal Abduction         Horizontal Adduction         Lats              RESTRICTIONS/PRECAUTIONS: Prox humeral fracture on 5/3/22 w surg on 5/6/22- Aggressive PROM; begin strengthening ~6/29/22  Exercises/Interventions:   Exercise/Equipment Resistance/Repetitions Comments   Cardio/Warm-up     Bike          Stretching/PROM     Wand   10x10\" at 45°    Table Slides Flex and scaption 10\"x10    IR behind back  10x10\" Belt up the back    Sleeper Stretch 10x10\"    OH Cane Flexion 10x10\"    Pulleys 10x10\" Flexion, scaption    Wall slides 10x10\" Flexion       Wrist flex/ext           STRENGTH     Isometrics     Retraction          Weight shift     Flexion     Abduction     External Rotation     Internal Rotation     Biceps     Triceps          PRE's        Flexion     Abduction     External Rotation     Internal Rotation     Shrugs 3x10; 5# In front of mirror   EXT     Reverse Flys     Serratus     Horizontal Abd with ER     Biceps 3x10; 5#    Triceps             Cable Column/Theraband     External Rotation     Internal Rotation     Shrugs     Lats     Ext 3x10 Green    Flex     Scapular Retraction 3x10 Black    BIC     TRIC     PNF          Neuromuscular Re-ed     Dynamic Stability                                                  Manual/Modalities      PROM 8' [place], IR, ER, abduction               Therapeutic Exercise and NMR EXR  [x] (46308) Provided verbal/tactile cueing for activities related to strengthening, flexibility, endurance, ROM  for improvements in scapular, scapulothoracic and UE control with self care, reaching, carrying, lifting, house/yardwork, driving/computer work.    [] (29753) Provided verbal/tactile cueing for activities related to improving balance, coordination, kinesthetic sense, posture, motor skill, proprioception  to assist with  scapular, scapulothoracic and UE control with self care, reaching, carrying, lifting, house/yardwork, driving/computer work. Therapeutic Activities:    [x] (32237 or 04986) Provided verbal/tactile cueing for activities related to improving balance, coordination, kinesthetic sense, posture, motor skill, proprioception and motor activation to allow for proper function of scapular, scapulothoracic and UE control with self care, carrying, lifting, driving/computer work. Home Exercise Program:    [x] (74579) Reviewed/Progressed HEP activities related to strengthening, flexibility, endurance, ROM of scapular, scapulothoracic and UE control with self care, reaching, carrying, lifting, house/yardwork, driving/computer work     Written HEP est    Access Code: [email or web address]  URL: [email or web address]  Date: 06/09/2022  Prepared by:  [employer]    Exercises  Seated Shoulder Flexion Towel Slide at Table Top - 3 x daily - 7 x weekly - 1 sets - 10 reps - 10 hold  Seated Shoulder Abduction Towel Slide at Table Top - 3 x daily - 7 x weekly - 1 sets - 10 reps - 10 hold  Seated Shoulder External Rotation AAROM with Cane and Hand in Neutral - 3 x daily - 7 x weekly - 1 sets - 10 reps - 10 hold  Supine Shoulder External Rotation in 45 Degrees Abduction AAROM with Dowel - 3 x daily - 7 x weekly - 10 reps - 10 hold  Standing Shoulder Internal Rotation AAROM Behind Back with Towel - 3 x daily - 7 x weekly - 1 sets - 10 reps - 10 hold  Seated Scapular Retraction - 1-2 x daily - 7 x weekly - 3 sets - 10 reps  Seated Shoulder Shrugs - 1-2 x daily - 7 x weekly - 3 sets - 10 reps  Seated Wrist Flexion with Dumbbell - 1-2 x daily - 7 x weekly - 3 sets - 10 reps  Seated Wrist Extension with Dumbbell - 1-2 x daily - 7 x weekly - 3 sets - 10 reps  Seated Bicep Curls Supinated with Dumbbells - 1-2 x daily - 7 x weekly - 3 sets - 10 reps          [x] (62126) Reviewed/Progressed HEP activities related to improving balance, coordination, kinesthetic sense, posture, motor skill, proprioception of scapular, scapulothoracic and UE control with self care, reaching, carrying, lifting, house/yardwork, driving/computer work      Manual Treatments:  PROM / STM / Oscillations-Mobs:  G-I, II, III, IV (PA's, Inf., Post.)  [x] (45096) Provided manual therapy to mobilize soft tissue/joints of cervical/CT, scapular GHJ and UE for the purpose of modulating pain, promoting relaxation,  increasing ROM, reducing/eliminating soft tissue swelling/inflammation/restriction, improving soft tissue extensibility and allowing for proper ROM for normal function with self care, reaching, carrying, lifting, house/yardwork, driving/computer work    Modalities:  CP x 15'     [] GAME READY (VASO)- for significant edema, swelling, pain control. Charges:  Timed Code Treatment Minutes: 45   Total Treatment Minutes: 60   7:50-9:02    [] EVAL (LOW) 76933 (typically 20 minutes face-to-face)  [] EVAL (MOD) 84019 (typically 30 minutes face-to-face)  [] EVAL (HIGH) 96016 (typically 45 minutes face-to-face)  [] RE-EVAL   [x] OM(71408) x 1  [] IONTO  [] NMR (19478) x     [] VASO  [x] Manual (36082) x 1     [] Other:  [x] TA (97565) x 1   [] Mech Traction (75955)  [] ES(attended) (06947)      [] ES (un) (25478):     GOALS:  Patient stated goal: regain full strength and function in R shoulder  [] Progressing: [] Met: [] Not Met: [] Adjusted    Therapist goals for Patient:   Short Term Goals: To be achieved in: 2 weeks  1. Independent in HEP and progression per patient tolerance, in order to prevent re-injury. [] Progressing: [] Met: [] Not Met: [] Adjusted  2.  Patient will have a decrease in pain to facilitate improvement in movement, function, and ADLs as indicated by Functional Deficits. [] Progressing: [] Met: [] Not Met: [] Adjusted    Long Term Goals: To be achieved in: 12 weeks  1. Disability index score of 67 % or less for the FOTO to assist with reaching prior level of function. [] Progressing: [] Met: [] Not Met: [] Adjusted  2. Patient will demonstrate increased AROM to 170+ elevation, 90 ER, and reach mid back to allow for proper joint functioning as indicated by patients Functional Deficits. [] Progressing: [] Met: [] Not Met: [] Adjusted  3. Patient will demonstrate an increase in Strength to 5/5 to allow for proper functional mobility as indicated by patients Functional Deficits. [] Progressing: [] Met: [] Not Met: [] Adjusted  4. Patient will return to 90% functional activities without increased symptoms or restriction. [] Progressing: [] Met: [] Not Met: [] Adjusted  5. Pt will be able to return to playing siddhartha ball w/o restrictions.(patient specific functional goal)    [] Progressing: [] Met: [] Not Met: [] Adjusted    Overall Progression Towards Functional goals/ Treatment Progress Update:  [] Patient is progressing as expected towards functional goals listed. [] Progression is slowed due to complexities/Impairments listed. [] Progression has been slowed due to co-morbidities. [x] Plan just implemented, too soon to assess goals progression <30days   [] Goals require adjustment due to lack of progress  [] Patient is not progressing as expected and requires additional follow up with physician  [] Other    ASSESSMENT:      Treatment/Activity Tolerance:  [] Patient tolerated treatment well [] Patient limited by fatique  [] Patient limited by pain  [] Patient limited by other medical complications  [] Other: Slight decrease in his tolerance to PT today.  Had increased muscle soreness at baseline in forearm and wrist due to returning to work (computer, phone, etc.). He had increased muscle guarding today with manual PROM which did prevent PT from reaching end feel during manual stretching. He had most tightness noted today into ER with loss of 10 degrees from last visit. Encouraged proper setup for computer work at home to help with muscle relaxation. Continue PT to improve joint mobility and ROM to allow patient to safely return to PLOF. Prognosis: [] Good [] Fair  [] Poor    Patient Requires Follow-up: [x] Yes  [] No    PLAN: See eval  [x] Continue per plan of care [] Alter current plan (see comments)  [x] Plan of care initiated [] Hold pending MD visit [] Discharge    Electronically signed by: Raul Santamaria, PT, DPT, OCS    Note: If patient does not return for scheduled/ recommended follow up visits, this note will serve as a discharge from care along with most recent update on progress.

## 2022-06-23 ENCOUNTER — HOSPITAL ENCOUNTER (OUTPATIENT)
Dept: PHYSICAL THERAPY | Age: 57
Setting detail: THERAPIES SERIES
Discharge: HOME | End: 2022-06-23
Payer: COMMERCIAL

## 2022-06-23 PROCEDURE — 97140 MANUAL THERAPY 1/> REGIONS: CPT

## 2022-06-23 PROCEDURE — 97530 THERAPEUTIC ACTIVITIES: CPT

## 2022-06-23 PROCEDURE — 97110 THERAPEUTIC EXERCISES: CPT

## 2022-06-23 NOTE — FLOWSHEET NOTE
[address] [facility], [place]    Physical Therapy Daily Treatment Note      Date:  2022    Patient Name:  Robe Del Rosario    :  1965  MRN: [id number]  Restrictions/Precautions: prox humeral fx 22  Medical/Treatment Diagnosis Information:  · Diagnosis: S42.201A (ICD-10-CM) - Closed fracture of proximal end of right humerus, unspecified fracture morphology, initial encounter; Z98.890, Z87.81 (ICD-10-CM) - S/P ORIF (open reduction internal fixation) fracture      DOS: 22       OPEN REDUCTION INTERNAL FIXATION PROXIMAL HUMERUS, RIGHT   · Treating Diagnosis: dec ROM in R UE, weakness in R UE, pain in R shoulder     Insurance/Certification information:  PT Insurance Information: R  Physician Information:   Dr. Eli Grimes  Has the plan of care been signed (Y/N):        []  Yes  [x]  No     Date of Patient follow up with Physician:       Is this a Progress Report:     []  Yes  [x]  No        If Yes:  Date Range for reporting period:  Beginnin22  Endin22    Progress report will be due (10 Rx or 30 days whichever is less):        Recertification will be due (POC Duration  / 90 days whichever is less): 22         Visit # Insurance Allowable Auth Required   7 60 []  Yes [x]  No        Functional Scale: FOTO 37, DASH 77    Date assessed:  22      Latex Allergy:  [x]NO      []YES  Preferred Language for Healthcare:   [x]English       []other:    Pain level:  1/10     SUBJECTIVE: Reports he feels \"pretty good\" today and denies pain or soreness after last visit. Reports his HEP has been going well and he does not feel much of a stretch with tables slides anymore.      Occupation/School: ; [employer];     Living Status/Prior Level of Function: Independent with ADLs and IADLs, unlimited prior to injury in R shoulder;     OBJECTIVE:     22  ROM PROM AROM  Comment     L R      L R    Flexion   130 OH Cane             Abduction   103 deg w/ table slides         ER     66° at 45 deg ABD          IR    mid R buttock with cane behind back          Elbow ext   Lacking 4          Other                 5/26/22 deferred due to surg  Strength L R Comment   Flexion     5/26/2022   Abduction         ER         IR         Supraspinatus         Upper Trap         Lower Trap         Mid Trap         Rhomboids         Biceps         Triceps         Horizontal Abduction         Horizontal Adduction         Lats              RESTRICTIONS/PRECAUTIONS: Prox humeral fracture on 5/3/22 w surg on 5/6/22- Aggressive PROM; begin strengthening ~6/29/22  Exercises/Interventions:   Exercise/Equipment Resistance/Repetitions Comments   Cardio/Warm-up     Bike          Stretching/PROM     Wand   10x10\" at 45°       IR behind back  10x10\" Belt up the back    Sleeper Stretch 10x10\"    Cross Body Adduction 10x10\" Supine    OH Cane Flexion 10x10\"    Pulleys 10x10\" Flexion, scaption    Wall slides 10x10\" Flexion, scaption       Pec Stretch Doorway 3x30\"          Wrist flex/ext           STRENGTH     Isometrics     Retraction          Weight shift     Flexion     Abduction     External Rotation     Internal Rotation     Biceps     Triceps          PRE's        Flexion     Abduction     External Rotation     Internal Rotation     Shrugs 3x10; 5# In front of mirror   EXT     Reverse Flys     Serratus     Horizontal Abd with ER     Biceps 3x10; 5#    Triceps             Cable Column/Theraband     External Rotation     Internal Rotation     Shrugs     Lats     Ext 3x10 Blue    Flex     Scapular Retraction 3x10 Silver    BIC     TRIC 3x10, Black    PNF          Neuromuscular Re-ed     Dynamic Stability                                                  Manual/Modalities      PROM 8' Fleixon, IR, ER, abduction               Therapeutic Exercise and NMR EXR  [x] (28634) Provided verbal/tactile cueing for activities related to strengthening, flexibility, endurance, ROM  for improvements in scapular, scapulothoracic and UE control with self care, reaching, carrying, lifting, house/yardwork, driving/computer work.    [] (27841) Provided verbal/tactile cueing for activities related to improving balance, coordination, kinesthetic sense, posture, motor skill, proprioception  to assist with  scapular, scapulothoracic and UE control with self care, reaching, carrying, lifting, house/yardwork, driving/computer work. Therapeutic Activities:    [x] (84100 or 00055) Provided verbal/tactile cueing for activities related to improving balance, coordination, kinesthetic sense, posture, motor skill, proprioception and motor activation to allow for proper function of scapular, scapulothoracic and UE control with self care, carrying, lifting, driving/computer work. Home Exercise Program:    [x] (68542) Reviewed/Progressed HEP activities related to strengthening, flexibility, endurance, ROM of scapular, scapulothoracic and UE control with self care, reaching, carrying, lifting, house/yardwork, driving/computer work     Written HEP est    Access Code: [email or web address]  URL: [email or web address]  Date: 06/23/2022  Prepared by:  Sundar Solorio    Exercises  Seated Shoulder External Rotation AAROM with Cane and Hand in Neutral - 3 x daily - 7 x weekly - 1 sets - 10 reps - 10 hold  Supine Shoulder External Rotation in 45 Degrees Abduction AAROM with Dowel - 3 x daily - 7 x weekly - 10 reps - 10 hold  Standing Shoulder Internal Rotation AAROM Behind Back with Towel - 3 x daily - 7 x weekly - 1 sets - 10 reps - 10 hold  Seated Scapular Retraction - 1-2 x daily - 7 x weekly - 3 sets - 10 reps  Seated Shoulder Shrugs - 1-2 x daily - 7 x weekly - 3 sets - 10 reps  Seated Wrist Flexion with Dumbbell - 1-2 x daily - 7 x weekly - 3 sets - 10 reps  Seated Wrist Extension with Dumbbell - 1-2 x daily - 7 x weekly - 3 sets - 10 reps  Seated Bicep Curls Supinated with Dumbbells - 1-2 x daily - 7 x weekly - 3 sets - 10 reps  Doorway Pec Stretch at 90 Degrees Abduction - 1 x daily - 7 x weekly - 3 sets - 30\" hold  Supine Cross Body Shoulder Stretch - 1 x daily - 7 x weekly - 3 reps - 30\" hold  Standing Elbow Extension with Anchored Resistance - 1 x daily - 7 x weekly - 3 sets - 10 reps  Scaption Wall Slide with Towel - 1 x daily - 7 x weekly - 10 sets - 10\" hold  Standing Single Shoulder Flexion Wall Slide with Palm Up - 1 x daily - 7 x weekly - 10 sets - 10\" hold            [x] (40807) Reviewed/Progressed HEP activities related to improving balance, coordination, kinesthetic sense, posture, motor skill, proprioception of scapular, scapulothoracic and UE control with self care, reaching, carrying, lifting, house/yardwork, driving/computer work      Manual Treatments:  PROM / STM / Oscillations-Mobs:  G-I, II, III, IV (PA's, Inf., Post.)  [x] (02501) Provided manual therapy to mobilize soft tissue/joints of cervical/CT, scapular GHJ and UE for the purpose of modulating pain, promoting relaxation,  increasing ROM, reducing/eliminating soft tissue swelling/inflammation/restriction, improving soft tissue extensibility and allowing for proper ROM for normal function with self care, reaching, carrying, lifting, house/yardwork, driving/computer work    Modalities:       [] GAME READY (VASO)- for significant edema, swelling, pain control.        Charges:  Timed Code Treatment Minutes: 45   Total Treatment Minutes: 45   932-10:35    [] EVAL (LOW) 60480 (typically 20 minutes face-to-face)  [] EVAL (MOD) 33127 (typically 30 minutes face-to-face)  [] EVAL (HIGH) 73004 (typically 45 minutes face-to-face)  [] RE-EVAL   [x] ST(92374) x 1  [] IONTO  [] NMR (90389) x     [] VASO  [x] Manual (08995) x 1     [] Other:  [x] TA (85028) x 1   [] Mech Traction (29918)  [] ES(attended) (45775)      [] ES (un) (75490):     GOALS:  Patient stated goal: regain full strength and function in R shoulder  [] Progressing: [] Met: [] Not Met: [] Adjusted    Therapist goals for Patient:   Short Term Goals: To be achieved in: 2 weeks  1. Independent in HEP and progression per patient tolerance, in order to prevent re-injury. [] Progressing: [] Met: [] Not Met: [] Adjusted  2. Patient will have a decrease in pain to facilitate improvement in movement, function, and ADLs as indicated by Functional Deficits. [] Progressing: [] Met: [] Not Met: [] Adjusted    Long Term Goals: To be achieved in: 12 weeks  1. Disability index score of 67 % or less for the FOTO to assist with reaching prior level of function. [] Progressing: [] Met: [] Not Met: [] Adjusted  2. Patient will demonstrate increased AROM to 170+ elevation, 90 ER, and reach mid back to allow for proper joint functioning as indicated by patients Functional Deficits. [] Progressing: [] Met: [] Not Met: [] Adjusted  3. Patient will demonstrate an increase in Strength to 5/5 to allow for proper functional mobility as indicated by patients Functional Deficits. [] Progressing: [] Met: [] Not Met: [] Adjusted  4. Patient will return to 90% functional activities without increased symptoms or restriction. [] Progressing: [] Met: [] Not Met: [] Adjusted  5. Pt will be able to return to playing [employer] ball w/o restrictions.(patient specific functional goal)    [] Progressing: [] Met: [] Not Met: [] Adjusted    Overall Progression Towards Functional goals/ Treatment Progress Update:  [] Patient is progressing as expected towards functional goals listed. [] Progression is slowed due to complexities/Impairments listed. [] Progression has been slowed due to co-morbidities.   [x] Plan just implemented, too soon to assess goals progression <30days   [] Goals require adjustment due to lack of progress  [] Patient is not progressing as expected and requires additional follow up with physician  [] Other    ASSESSMENT:      Treatment/Activity Tolerance:  [x] Patient tolerated treatment well [] Patient limited by fatique  [] Patient limited by pain  [] Patient limited by other medical complications  [x] Other: Tolerated treatment well today but still exhibits tightness with shoulder ER/IR and flexion. Gave verbal cue to avoid leaning trunk backwards during scapular retractions and to focus on isolating the motion to the scapular muscles. Updated HEP to remove table slides and add wall slides and a few stretches. Continued to emphasize frequency of HEP/stretching needs to be minimum 3x/day. Patient continues to benefit from skilled PT to progress ROM, strengthening and functional activities in order to allow for return to PLOF. Prognosis: [] Good [] Fair  [] Poor    Patient Requires Follow-up: [x] Yes  [] No    PLAN: See eval  [x] Continue per plan of care [] Alter current plan (see comments)  [] Plan of care initiated [] Hold pending MD visit [] Discharge    Electronically signed by: Maykel Barton, PT, DPT, OCS    Ora Stubbs, SPT    Therapist was present, directed the patient's care, made skilled judgement, and was responsible for assessment, treatment, and progression of the patient. 6/23/2022       Note: If patient does not return for scheduled/ recommended follow up visits, this note will serve as a discharge from care along with most recent update on progress.

## 2022-06-27 ENCOUNTER — HOSPITAL ENCOUNTER (OUTPATIENT)
Dept: PHYSICAL THERAPY | Age: 57
Setting detail: THERAPIES SERIES
Discharge: HOME | End: 2022-06-27
Payer: COMMERCIAL

## 2022-06-27 PROCEDURE — 97530 THERAPEUTIC ACTIVITIES: CPT

## 2022-06-27 PROCEDURE — 97110 THERAPEUTIC EXERCISES: CPT

## 2022-06-27 NOTE — PLAN OF CARE
[address]      Physical Therapy Re-Certification Plan of Care    Dear  Dr. Daniel King,    We had the pleasure of treating the following patient for physical therapy services at [address] [place] [address]. A summary of our findings can be found in the updated assessment below. This includes our plan of care. If you have any questions or concerns regarding these findings, please do not hesitate to contact me at [phone]. Thank you for the referral.     Physician Signature:________________________________Date:__________________  By signing above (or electronic signature), therapists plan is approved by physician      Overall Response to Treatment:   [x]Patient is responding well to treatment and improvement is noted with regards  to goals   []Patient should continue to improve in reasonable time if they continue HEP   []Patient has plateaued and is no longer responding to skilled PT intervention    []Patient is getting worse and would benefit from return to referring MD   []Patient unable to adhere to initial POC   []Other: Patient is now 7 weeks PO ORIF and is progressing well towards goals. He continues to have limited R shoulder A/PROM with flexion, abduction, IR and ER. He also demonstrates  R posterior shoulder capsule tightness compared to L side. Patient was able to activate shoulder musculature today to meet PT's resistance without pain demonstrating that he is ready to begin light strengthening. He reports mild pain and \"aching\" after PT sessions but reports his overall pain level has decreased. Due to his continued deficits, PT is recommended to continue twice per week for 4 weeks in order to increase R shoulder PROM, AROM, and R shoulder strength in order to allow pt to return to PLOF.      Date range of Visits: 5/26/22-6/27/22  Total Visits: 8    Physical Therapy Daily Treatment Note      Date:  6/27/2022    Patient Name:  Kimberly Salas Yolanda Murray    :  1965  MRN: [id number]  Restrictions/Precautions: prox humeral fx 22  Medical/Treatment Diagnosis Information:  · Diagnosis: S42.201A (ICD-10-CM) - Closed fracture of proximal end of right humerus, unspecified fracture morphology, initial encounter; Z98.890, Z87.81 (ICD-10-CM) - S/P ORIF (open reduction internal fixation) fracture      DOS: 22       OPEN REDUCTION INTERNAL FIXATION PROXIMAL HUMERUS, RIGHT   · Treating Diagnosis: dec ROM in R UE, weakness in R UE, pain in R shoulder     Insurance/Certification information:  PT Insurance Information: R  Physician Information:   Dr. Violet Us  Has the plan of care been signed (Y/N):        [x]  Yes  []  No     Date of Patient follow up with Physician: 22      Is this a Progress Report:     [x]  Yes  []  No        If Yes:  Date Range for reporting period:  Beginnin22  Endin22    Progress report will be due (10 Rx or 30 days whichever is less): 2/98/10      Recertification will be due (POC Duration  / 90 days whichever is less): 22        Visit # Insurance Allowable Auth Required   8 60 []  Yes [x]  No        Functional Scale:    OUTCOME MEASURE DATE SCORE   FOTO Shoulder 22 37   FOTO Shoulder 22 55         Latex Allergy:  [x]NO      []YES  Preferred Language for Healthcare:   [x]English       []other:    Pain level:  0/10     SUBJECTIVE: Patient reports shoulder was \"achy\" over the weekend at the end of the day and after performing exercises on HEP. Patient reports his shoulder felt tired after last visit but denies pain.         · Occupation/School: ; [employer];  · Living Status/Prior Level of Function: Independent with ADLs and IADLs, unlimited prior to injury in R shoulder;        OBJECTIVE:     22  ROM PROM AROM  Comment     L R      L R    Flexion   140°        160°  135°     Abduction   105°      175°  85°     ER     68° at 45 deg ABD   75°  68° at 45 deg ABD     IR    52°  85°  50° at 45 deg ABD     Elbow ext        0°     Other                 6/27/22--formal MMT deferred  Strength L R Comment   Flexion Can activate with light manual resistance without pain Can activate with light manual resistance without pain    Abduction  Can activate with light manual resistance without pain  Can activate with light manual resistance without pain     ER  Can activate with light manual resistance without pain  Can activate with light manual resistance without pain     IR  Can activate with light manual resistance without pain  Can activate with light manual resistance without pain     Supraspinatus         Upper Trap         Lower Trap         Mid Trap         Rhomboids         Biceps         Triceps         Horizontal Abduction         Horizontal Adduction         Lats              RESTRICTIONS/PRECAUTIONS: Prox humeral fracture on 5/3/22 w surg on 5/6/22- Aggressive PROM; begin strengthening ~6/29/22  Exercises/Interventions:   Exercise/Equipment Resistance/Repetitions Comments   Cardio/Warm-up     Bike          Stretching/PROM     Wand   10x10\" at 45°       IR behind back  10x10\" Belt up the back    Add back NPV   Cross Body Adduction 10x10\" Supine    OH Cane Flexion 10x10\"    Pulleys 10x10\" Flexion, scaption    Wall slides 10x10\" Flexion, scaption       Pec Stretch Doorway 3x30\"          Wrist flex/ext           STRENGTH     Isometrics     Retraction          Weight shift     Flexion NPV    Abduction NPV    External Rotation NPV w band     Internal Rotation NPV w band    Biceps     Triceps          PRE's        Flexion     Abduction     External Rotation     Internal Rotation     In front of mirror add back NPV   EXT     Reverse Flys     Serratus     Horizontal Abd with ER     Add back NPV   Triceps             Cable Column/Theraband     External Rotation     Internal Rotation     Shrugs     Lats     Ext  Add back NPV   Flex     Add back NPV   BIC     Add back NPV   PNF Neuromuscular Re-ed     Dynamic Stability                                                  Manual/Modalities      Add back NPV             Therapeutic Exercise and NMR EXR  [x] (73196) Provided verbal/tactile cueing for activities related to strengthening, flexibility, endurance, ROM  for improvements in scapular, scapulothoracic and UE control with self care, reaching, carrying, lifting, house/yardwork, driving/computer work.    [] (01537) Provided verbal/tactile cueing for activities related to improving balance, coordination, kinesthetic sense, posture, motor skill, proprioception  to assist with  scapular, scapulothoracic and UE control with self care, reaching, carrying, lifting, house/yardwork, driving/computer work. Therapeutic Activities:    [x] (80766 or 97509) Provided verbal/tactile cueing for activities related to improving balance, coordination, kinesthetic sense, posture, motor skill, proprioception and motor activation to allow for proper function of scapular, scapulothoracic and UE control with self care, carrying, lifting, driving/computer work. Home Exercise Program:    [x] (17053) Reviewed/Progressed HEP activities related to strengthening, flexibility, endurance, ROM of scapular, scapulothoracic and UE control with self care, reaching, carrying, lifting, house/yardwork, driving/computer work     Written HEP est    Access Code: [email or web address]  URL: [email or web address]  Date: 06/23/2022  Prepared by:  Sundar Solorio    Exercises  Seated Shoulder External Rotation AAROM with Cane and Hand in Neutral - 3 x daily - 7 x weekly - 1 sets - 10 reps - 10 hold  Supine Shoulder External Rotation in 45 Degrees Abduction AAROM with Dowel - 3 x daily - 7 x weekly - 10 reps - 10 hold  Standing Shoulder Internal Rotation AAROM Behind Back with Towel - 3 x daily - 7 x weekly - 1 sets - 10 reps - 10 hold  Seated Scapular Retraction - 1-2 x daily - 7 x weekly - 3 sets - 10 reps  Seated Shoulder Shrugs - 1-2 x daily - 7 x weekly - 3 sets - 10 reps  Seated Wrist Flexion with Dumbbell - 1-2 x daily - 7 x weekly - 3 sets - 10 reps  Seated Wrist Extension with Dumbbell - 1-2 x daily - 7 x weekly - 3 sets - 10 reps  Seated Bicep Curls Supinated with Dumbbells - 1-2 x daily - 7 x weekly - 3 sets - 10 reps  Doorway Pec Stretch at 90 Degrees Abduction - 1 x daily - 7 x weekly - 3 sets - 30\" hold  Supine Cross Body Shoulder Stretch - 1 x daily - 7 x weekly - 3 reps - 30\" hold  Standing Elbow Extension with Anchored Resistance - 1 x daily - 7 x weekly - 3 sets - 10 reps  Scaption Wall Slide with Towel - 1 x daily - 7 x weekly - 10 sets - 10\" hold  Standing Single Shoulder Flexion Wall Slide with Palm Up - 1 x daily - 7 x weekly - 10 sets - 10\" hold        [x] (02358) Reviewed/Progressed HEP activities related to improving balance, coordination, kinesthetic sense, posture, motor skill, proprioception of scapular, scapulothoracic and UE control with self care, reaching, carrying, lifting, house/yardwork, driving/computer work      Manual Treatments:  PROM / STM / Oscillations-Mobs:  G-I, II, III, IV (PA's, Inf., Post.)  [x] (06807) Provided manual therapy to mobilize soft tissue/joints of cervical/CT, scapular GHJ and UE for the purpose of modulating pain, promoting relaxation,  increasing ROM, reducing/eliminating soft tissue swelling/inflammation/restriction, improving soft tissue extensibility and allowing for proper ROM for normal function with self care, reaching, carrying, lifting, house/yardwork, driving/computer work    Modalities:  CP 15'     [] GAME READY (VASO)- for significant edema, swelling, pain control.        Charges:  Timed Code Treatment Minutes: 30   Total Treatment Minutes: 23   [id number]     [] EVAL (LOW) 25967 (typically 20 minutes face-to-face)  [] EVAL (MOD) 93559 (typically 30 minutes face-to-face)  [] EVAL (HIGH) 72488 (typically 45 minutes face-to-face)  [] RE-EVAL   [x] ZG(39015) x 1  [] IONTO  [] NMR (15092) x     [] VASO  [] Manual (71584) x      [] Other:  [x] TA (70814) x 1   [] Mech Traction (36975)  [] ES(attended) (84092)      [] ES (un) (37900):     GOALS:  Patient stated goal: regain full strength and function in R shoulder  [x] Progressing: [] Met: [] Not Met: [] Adjusted    Therapist goals for Patient:   Short Term Goals: To be achieved in: 2 weeks  1. Independent in HEP and progression per patient tolerance, in order to prevent re-injury. [] Progressing: [x] Met: [] Not Met: [] Adjusted  2. Patient will have a decrease in pain to facilitate improvement in movement, function, and ADLs as indicated by Functional Deficits. [] Progressing: [x] Met: [] Not Met: [] Adjusted    Long Term Goals: To be achieved in: 12 weeks  1. Disability index score of 67 or more for the FOTO to assist with reaching prior level of function. [x] Progressing: [] Met: [] Not Met: [] Adjusted  2. Patient will demonstrate increased AROM to 170+ elevation, 90 ER, and reach mid back to allow for proper joint functioning as indicated by patients Functional Deficits. [x] Progressing: [] Met: [] Not Met: [] Adjusted  3. Patient will demonstrate an increase in Strength to 5/5 to allow for proper functional mobility as indicated by patients Functional Deficits. [x] Progressing: [] Met: [] Not Met: [] Adjusted  4. Patient will return to 90% functional activities without increased symptoms or restriction. [x] Progressing: [] Met: [] Not Met: [] Adjusted  5. Pt will be able to return to playing siddhartha ball w/o restrictions.(patient specific functional goal)    [x] Progressing: [] Met: [] Not Met: [] Adjusted    Overall Progression Towards Functional goals/ Treatment Progress Update:  [x] Patient is progressing as expected towards functional goals listed. [] Progression is slowed due to complexities/Impairments listed. [] Progression has been slowed due to co-morbidities.   [] Plan just implemented, too soon to assess goals progression <30days   [] Goals require adjustment due to lack of progress  [] Patient is not progressing as expected and requires additional follow up with physician  [] Other    ASSESSMENT:      Treatment/Activity Tolerance:  [x] Patient tolerated treatment well [] Patient limited by fatique  [] Patient limited by pain  [] Patient limited by other medical complications  [x] Other: Patient tolerated treatment well today. Continues to exhibit limited R shoulder flexion, abduction, IR and ER AROM due to feeling \"tight\". Patient reported mild pain and \"shakiness\" during test of shoulder strength by meeting PT's resistance. Discussed initiating light strengthening exercises next visit and emphasized importance of continued adherence to HEP, particluarly stretching exercises. Continue PT to increase R shoulder ROM, begin to safely strengthen R shoulder and allow for return to PLOF. Prognosis: [x] Good [] Fair  [] Poor    Patient Requires Follow-up: [x] Yes  [] No    PLAN: See eval  [x] Continue per plan of care [] Alter current plan (see comments)  [] Plan of care initiated [] Hold pending MD visit [] Discharge    Electronically signed by: Kellie Hodgkin, PT, DPT, OCS    Bobbye Lundborg, SPT    Therapist was present, directed the patient's care, made skilled judgement, and was responsible for assessment, treatment, and progression of the patient. 6/27/2022       Note: If patient does not return for scheduled/ recommended follow up visits, this note will serve as a discharge from care along with most recent update on progress.

## 2022-06-30 ENCOUNTER — HOSPITAL ENCOUNTER (OUTPATIENT)
Dept: PHYSICAL THERAPY | Age: 57
Setting detail: THERAPIES SERIES
Discharge: HOME | End: 2022-06-30
Payer: COMMERCIAL

## 2022-06-30 PROCEDURE — 97140 MANUAL THERAPY 1/> REGIONS: CPT

## 2022-06-30 PROCEDURE — 97530 THERAPEUTIC ACTIVITIES: CPT

## 2022-06-30 PROCEDURE — 97110 THERAPEUTIC EXERCISES: CPT

## 2022-06-30 NOTE — FLOWSHEET NOTE
[address] [facility], [place]         Physical Therapy Daily Treatment Note      Date:  2022    Patient Name:  Karlos Sales    :  1965  MRN: [id number]  Restrictions/Precautions: prox humeral fx 22  Medical/Treatment Diagnosis Information:  · Diagnosis: S42.201A (ICD-10-CM) - Closed fracture of proximal end of right humerus, unspecified fracture morphology, initial encounter; Z98.890, Z87.81 (ICD-10-CM) - S/P ORIF (open reduction internal fixation) fracture      DOS: 22       OPEN REDUCTION INTERNAL FIXATION PROXIMAL HUMERUS, RIGHT   · Treating Diagnosis: dec ROM in R UE, weakness in R UE, pain in R shoulder     Insurance/Certification information:  PT Insurance Information: [facility]  Physician Information:   Dr. Masood Horvath  Has the plan of care been signed (Y/N):        [x]  Yes  []  No     Date of Patient follow up with Physician: 22      Is this a Progress Report:     []  Yes  [x]  No        If Yes:  Date Range for reporting period:  Beginnin22  Ending:     Progress report will be due (10 Rx or 30 days whichever is less):       Recertification will be due (POC Duration  / 90 days whichever is less): 22        Visit # Insurance Allowable Auth Required   9 60 []  Yes [x]  No        Functional Scale:    OUTCOME MEASURE DATE SCORE   FOTO Shoulder 22 37   FOTO Shoulder 22 55         Latex Allergy:  [x]NO      []YES  Preferred Language for Healthcare:   [x]English       []other:    Pain level:  0-1/10     SUBJECTIVE: Patient reports he was \"sore\" in anterior shoulder for 2 days after last session. Today the soreness is reduced and pain is about a 1/10. 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        · Occupation/School: ; [employer];  · Living Status/Prior Level of Function: Independent with ADLs and IADLs, unlimited prior to injury in R shoulder;        OBJECTIVE: 5#    Triceps     Retraction 3x10          Cable Column/Theraband     External Rotation     Internal Rotation     Shrugs     Lats     Ext 3x10 Blue    Flex     Scapular Retraction 3x10 Silver    BIC     TRIC 3x10, Black    PNF          Neuromuscular Re-ed     Dynamic Stability                                                  Manual/Modalities      PROM 8' Flexion, IR, ER, abduction               Therapeutic Exercise and NMR EXR  [x] (74389) Provided verbal/tactile cueing for activities related to strengthening, flexibility, endurance, ROM  for improvements in scapular, scapulothoracic and UE control with self care, reaching, carrying, lifting, house/yardwork, driving/computer work.    [] (57794) Provided verbal/tactile cueing for activities related to improving balance, coordination, kinesthetic sense, posture, motor skill, proprioception  to assist with  scapular, scapulothoracic and UE control with self care, reaching, carrying, lifting, house/yardwork, driving/computer work. Therapeutic Activities:    [x] (53494 or 81650) Provided verbal/tactile cueing for activities related to improving balance, coordination, kinesthetic sense, posture, motor skill, proprioception and motor activation to allow for proper function of scapular, scapulothoracic and UE control with self care, carrying, lifting, driving/computer work. Home Exercise Program:    [x] (76628) Reviewed/Progressed HEP activities related to strengthening, flexibility, endurance, ROM of scapular, scapulothoracic and UE control with self care, reaching, carrying, lifting, house/yardwork, driving/computer work     Written HEP est    Access Code: [email or web address]  URL: [email or web address]  Date: 06/30/2022   Prepared by:  Sundar Soolrio    Exercises  Seated Shoulder External Rotation AAROM with Cane and Hand in Neutral - 3 x daily - 7 x weekly - 1 sets - 10 reps - 10 hold  Supine Shoulder External Rotation in 45 Degrees Abduction AAROM with Dowel - 3 x daily - 7 x weekly - 10 reps - 10 hold  Standing Shoulder Internal Rotation AAROM Behind Back with Towel - 3 x daily - 7 x weekly - 1 sets - 10 reps - 10 hold  Seated Scapular Retraction - 1-2 x daily - 7 x weekly - 3 sets - 10 reps  Seated Shoulder Shrugs - 1-2 x daily - 7 x weekly - 3 sets - 10 reps  Seated Bicep Curls Supinated with Dumbbells - 1-2 x daily - 7 x weekly - 3 sets - 10 reps  Doorway Pec Stretch at 90 Degrees Abduction - 1 x daily - 7 x weekly - 3 sets - 30\" hold  Supine Cross Body Shoulder Stretch - 1 x daily - 7 x weekly - 3 reps - 30\" hold  Standing Elbow Extension with Anchored Resistance - 1 x daily - 7 x weekly - 3 sets - 10 reps  Scaption Wall Slide with Towel - 1 x daily - 7 x weekly - 10 sets - 10\" hold  Standing Single Shoulder Flexion Wall Slide with Palm Up - 1 x daily - 7 x weekly - 10 sets - 10\" hold  Standing Isometric Shoulder Abduction with Doorway - Arm Bent - 1 x daily - 7 x weekly - 10 reps - 5-10\" hold  Standing Isometric Shoulder External Rotation with Doorway - 1 x daily - 7 x weekly - 10 reps - 5-10\" hold  Standing Isometric Shoulder Internal Rotation with Towel Roll at Doorway - 1 x daily - 7 x weekly - 10 reps - 5-10\" hold  Standing Isometric Shoulder Flexion with Doorway - Arm Bent - 1 x daily - 7 x weekly - 10 reps - 5-10\" hold          [x] (56828) Reviewed/Progressed HEP activities related to improving balance, coordination, kinesthetic sense, posture, motor skill, proprioception of scapular, scapulothoracic and UE control with self care, reaching, carrying, lifting, house/yardwork, driving/computer work      Manual Treatments:  PROM / STM / Oscillations-Mobs:  G-I, II, III, IV (PA's, Inf., Post.)  [x] (06032) Provided manual therapy to mobilize soft tissue/joints of cervical/CT, scapular GHJ and UE for the purpose of modulating pain, promoting relaxation,  increasing ROM, reducing/eliminating soft tissue swelling/inflammation/restriction, improving soft tissue extensibility and allowing for proper ROM for normal function with self care, reaching, carrying, lifting, house/yardwork, driving/computer work    Modalities:  CP 15'     [] GAME READY (VASO)- for significant edema, swelling, pain control. Charges:  Timed Code Treatment Minutes: 55   Total Treatment Minutes: 93   484- 601    [] EVAL (LOW) 83501 (typically 20 minutes face-to-face)  [] EVAL (MOD) 76233 (typically 30 minutes face-to-face)  [] EVAL (HIGH) 94630 (typically 45 minutes face-to-face)  [] RE-EVAL   [x] WK(67093) x 2  [] IONTO  [] NMR (52467) x     [] VASO  [x] Manual (30572) x  1   [] Other:  [x] TA (61253) x 1   [] Mech Traction (96509)  [] ES(attended) (68751)      [] ES (un) (52086):     GOALS:  Patient stated goal: regain full strength and function in R shoulder  [x] Progressing: [] Met: [] Not Met: [] Adjusted    Therapist goals for Patient:   Short Term Goals: To be achieved in: 2 weeks  1. Independent in HEP and progression per patient tolerance, in order to prevent re-injury. [] Progressing: [x] Met: [] Not Met: [] Adjusted  2. Patient will have a decrease in pain to facilitate improvement in movement, function, and ADLs as indicated by Functional Deficits. [] Progressing: [x] Met: [] Not Met: [] Adjusted    Long Term Goals: To be achieved in: 12 weeks  1. Disability index score of 67 or more for the FOTO to assist with reaching prior level of function. [x] Progressing: [] Met: [] Not Met: [] Adjusted  2. Patient will demonstrate increased AROM to 170+ elevation, 90 ER, and reach mid back to allow for proper joint functioning as indicated by patients Functional Deficits. [x] Progressing: [] Met: [] Not Met: [] Adjusted  3. Patient will demonstrate an increase in Strength to 5/5 to allow for proper functional mobility as indicated by patients Functional Deficits. [x] Progressing: [] Met: [] Not Met: [] Adjusted  4.  Patient will return to 90% functional activities without increased symptoms or restriction. [x] Progressing: [] Met: [] Not Met: [] Adjusted  5. Pt will be able to return to playing siddhartha ball w/o restrictions.(patient specific functional goal)    [x] Progressing: [] Met: [] Not Met: [] Adjusted    Overall Progression Towards Functional goals/ Treatment Progress Update:  [x] Patient is progressing as expected towards functional goals listed. [] Progression is slowed due to complexities/Impairments listed. [] Progression has been slowed due to co-morbidities. [] Plan just implemented, too soon to assess goals progression <30days   [] Goals require adjustment due to lack of progress  [] Patient is not progressing as expected and requires additional follow up with physician  [] Other    ASSESSMENT:      Treatment/Activity Tolerance:  [x] Patient tolerated treatment well [] Patient limited by fatique  [] Patient limited by pain  [] Patient limited by other medical complications  [x] Other: Patient tolerated treatment well today. Reports mild pain in R anterior shoulder with supine cross body stretch. Initiated isometric shoulder strengthening today. Patient denied pain and reported muscle fatigue by the end of the exercises, demonstrating muscle weakness. Patient required verbal cueing to retract scapulae before performance of row exercise. Updated HEP to include isometrics. Continue PT to increase R shoulder A/PROM and R UE strength in order to allow pt to return to PLOF. Prognosis: [x] Good [] Fair  [] Poor    Patient Requires Follow-up: [x] Yes  [] No    PLAN: See eval  [x] Continue per plan of care [] Alter current plan (see comments)  [] Plan of care initiated [] Hold pending MD visit [] Discharge    Electronically signed by: Juliana Parikh, PT, DPT, OCS    DEONTE Pacheco    Therapist was present, directed the patient's care, made skilled judgement, and was responsible for assessment, treatment, and progression of the patient.    6/30/2022       Note: If patient does not return for scheduled/ recommended follow up visits, this note will serve as a discharge from care along with most recent update on progress.

## 2022-07-06 ENCOUNTER — OFFICE VISIT (OUTPATIENT)
Dept: ORTHOPEDIC SURGERY | Age: 57
End: 2022-07-06

## 2022-07-06 VITALS — BODY MASS INDEX: 29.28 KG/M2 | WEIGHT: 248 LBS | HEIGHT: 77 IN

## 2022-07-06 DIAGNOSIS — Z87.81 S/P ORIF (OPEN REDUCTION INTERNAL FIXATION) FRACTURE: Primary | ICD-10-CM

## 2022-07-06 DIAGNOSIS — Z98.890 S/P ORIF (OPEN REDUCTION INTERNAL FIXATION) FRACTURE: Primary | ICD-10-CM

## 2022-07-06 PROCEDURE — 99024 POSTOP FOLLOW-UP VISIT: CPT | Performed by: ORTHOPAEDIC SURGERY

## 2022-07-06 RX ORDER — ACETAMINOPHEN 500 MG
500 TABLET ORAL EVERY 6 HOURS PRN
COMMUNITY

## 2022-07-06 NOTE — PROGRESS NOTES
Delores Erazo  [id number]  July 6, 2022    Chief Complaint   Patient presents with    Post-Op Check     OPEN REDUCTION INTERNAL FIXATION PROXIMAL HUMERUS, RIGHT; DOS 5/6/22       History: The patient is here for follow-up regarding his right shoulder. He is now 2 months status post open reduction and internal fixation of the right proximal humerus. He is having mild pain. He denies any numbness or tingling. He is participating in physical therapy. The patient's  past medical history, medications, allergies,  family history, social history, and have been reviewed, and dated and are recorded in the chart. Pertinent items are noted in HPI. Review of systems reviewed from Pertinent History Form dated on 5/13/22 and available in the patient's chart under the Media tab. Vitals:  Ht 6' 5\" (1.956 m)   Wt 248 lb (112.5 kg)   BMI 29.41 kg/m²     Physical: On examination today, the patient is alert and oriented x 3. The incision is clean and dry. There is no sign of infection. He is neurovascularly intact distally. He extends his right elbow to approximately 0 degrees short of full extension. He flexes the right elbow to 150 degrees. He flexes and extends the right wrist without difficulty. We abducted the right shoulder to approximately 160 degrees without much pain. He forward flexes the right shoulder to 165 degrees. X-rays: 3 views of the right shoulder obtained in the office today were extensively reviewed. The hardware is in good position. The fracture is well aligned. There has been no further displacement. There has been a significant amount of callus formation. Impression: Status post open reduction and internal fixation of right proximal humerus fracture    Plan: At this time, we will continue our current treatment. We will progress the patient in therapy. He will continue with aggressive passive range of motion.   He will also continue with active and active assisted range of motion. He can continue strengthening the right shoulder. He will avoid weightbearing through the right upper extremity for the next month. At follow-up, 3 views of the right shoulder will be obtained. The patient works in IT at [employer].

## 2022-07-07 ENCOUNTER — HOSPITAL ENCOUNTER (OUTPATIENT)
Dept: PHYSICAL THERAPY | Age: 57
Setting detail: THERAPIES SERIES
Discharge: HOME | End: 2022-07-07
Payer: COMMERCIAL

## 2022-07-07 PROCEDURE — 97530 THERAPEUTIC ACTIVITIES: CPT

## 2022-07-07 PROCEDURE — 97140 MANUAL THERAPY 1/> REGIONS: CPT

## 2022-07-07 PROCEDURE — 97112 NEUROMUSCULAR REEDUCATION: CPT

## 2022-07-07 PROCEDURE — 97110 THERAPEUTIC EXERCISES: CPT

## 2022-07-07 NOTE — FLOWSHEET NOTE
[facility], [facility]         Physical Therapy Daily Treatment Note      Date:  2022    Patient Name:  Ifeoma Mancilla    :  1965  MRN: [id number]  Restrictions/Precautions: prox humeral fx 22  Medical/Treatment Diagnosis Information:  · Diagnosis: S42.201A (ICD-10-CM) - Closed fracture of proximal end of right humerus, unspecified fracture morphology, initial encounter; Z98.890, Z87.81 (ICD-10-CM) - S/P ORIF (open reduction internal fixation) fracture      DOS: 22       OPEN REDUCTION INTERNAL FIXATION PROXIMAL HUMERUS, RIGHT   · Treating Diagnosis: dec ROM in R UE, weakness in R UE, pain in R shoulder     Insurance/Certification information:  PT Insurance Information: R  Physician Information:   Dr. Lisa Grey  Has the plan of care been signed (Y/N):        [x]  Yes  []  No     Date of Patient follow up with Physician: 22      Is this a Progress Report:     []  Yes  [x]  No        If Yes:  Date Range for reporting period:  Beginnin22  Ending:     Progress report will be due (10 Rx or 30 days whichever is less): 7/10/71      Recertification will be due (POC Duration  / 90 days whichever is less): 22        Visit # Insurance Allowable Auth Required   10 60 []  Yes [x]  No        Functional Scale:    OUTCOME MEASURE DATE SCORE   FOTO Shoulder 22 37   FOTO Shoulder 22 55         Latex Allergy:  [x]NO      []YES  Preferred Language for Healthcare:   [x]English       []other:    Pain level:  0-1/10     SUBJECTIVE: Patient reports he was \"pretty sore\" after last session. Reports HEP has been going well and he performs his isometrics every other day due to muscle soreness.     · Occupation/School: ; [employer];  · Living Status/Prior Level of Function: Independent with ADLs and IADLs, unlimited prior to injury in R shoulder;        OBJECTIVE:     22  ROM PROM AROM  Comment     L R      L R    Flexion   140°        160°  135°     Abduction   105°      175°  85°     ER     68° at 45 deg ABD   75°  68° at 45 deg ABD     IR    52°   85°  50° at 45 deg ABD      Elbow ext        0°     Other                 6/27/22--formal MMT deferred  Strength L R Comment   Flexion Can activate with light manual resistance without pain Can activate with light manual resistance without pain    Abduction  Can activate with light manual resistance without pain  Can activate with light manual resistance without pain     ER  Can activate with light manual resistance without pain  Can activate with light manual resistance without pain     IR  Can activate with light manual resistance without pain  Can activate with light manual resistance without pain     Supraspinatus         Upper Trap         Lower Trap         Mid Trap         Rhomboids         Biceps         Triceps         Horizontal Abduction         Horizontal Adduction         Lats              RESTRICTIONS/PRECAUTIONS: Prox humeral fracture on 5/3/22 w surg on 5/6/22- Aggressive PROM; begin strengthening ~6/29/22  Exercises/Interventions:   Exercise/Equipment Resistance/Repetitions Comments   Cardio/Warm-up     Bike          Stretching/PROM     Wand   10x10\" at 45°       IR behind back  10x10\" Belt up the back    Sleeper Stretch 10x10\"    Cross Body Adduction 10x10\" Supine    OH Cane Flexion 10x10\"    Pulleys 10x10\" Flexion, scaption    Wall slides 10x10\" Flexion, scaption       Pec Stretch Doorway 3x30\"          Wrist flex/ext           STRENGTH     Isometrics     Retraction          Weight shift     Flexion 10x10\"    Abduction 10x10\"    External Rotation 10x10\" blue band    Internal Rotation 10x10\" blue` band     Biceps     Triceps          PRE's        Flexion 3x10 no weight 0-90; supine   Abduction sidelying no wt NPV    External Rotation     Internal Rotation     Shrugs 3x10; 5# In front of mirror    EXT     Reverse Flys     Serratus NPV    Horizontal Abd with ER Biceps 3x10; 6#    Triceps             Cable Column/Theraband     External Rotation     Internal Rotation     Shrugs     Lats     Ext 3x10 Blue    Flex     Scapular Retraction 3x10 Silver    BIC     TRIC 3x10, Black    PNF Try D2 NPV         Neuromuscular Re-ed     Dynamic Stability                                                  Manual/Modalities      PROM 8' Flexion, IR, ER, abduction               Therapeutic Exercise and NMR EXR  [x] (40716) Provided verbal/tactile cueing for activities related to strengthening, flexibility, endurance, ROM  for improvements in scapular, scapulothoracic and UE control with self care, reaching, carrying, lifting, house/yardwork, driving/computer work.    [] (61291) Provided verbal/tactile cueing for activities related to improving balance, coordination, kinesthetic sense, posture, motor skill, proprioception  to assist with  scapular, scapulothoracic and UE control with self care, reaching, carrying, lifting, house/yardwork, driving/computer work. Therapeutic Activities:    [x] (15603 or 57806) Provided verbal/tactile cueing for activities related to improving balance, coordination, kinesthetic sense, posture, motor skill, proprioception and motor activation to allow for proper function of scapular, scapulothoracic and UE control with self care, carrying, lifting, driving/computer work. Home Exercise Program:    [x] (49050) Reviewed/Progressed HEP activities related to strengthening, flexibility, endurance, ROM of scapular, scapulothoracic and UE control with self care, reaching, carrying, lifting, house/yardwork, driving/computer work     Written HEP est      Access Code: [email or web address]  URL: [email or web address]  Date: 07/07/2022  Prepared by:  Sundar Solorio    Exercises  Seated Shoulder External Rotation AAROM with Cane and Hand in Neutral - 3 x daily - 7 x weekly - 1 sets - 10 reps - 10 hold  Supine Shoulder External Rotation in 45 Degrees Abduction AAROM with Dowel - 3 x daily - 7 x weekly - 10 reps - 10 hold  Standing Shoulder Internal Rotation AAROM Behind Back with Towel - 3 x daily - 7 x weekly - 1 sets - 10 reps - 10 hold  Seated Scapular Retraction - 1-2 x daily - 7 x weekly - 3 sets - 10 reps  Seated Shoulder Shrugs - 1-2 x daily - 7 x weekly - 3 sets - 10 reps  Seated Bicep Curls Supinated with Dumbbells - 1-2 x daily - 7 x weekly - 3 sets - 10 reps  Doorway Pec Stretch at 90 Degrees Abduction - 1 x daily - 7 x weekly - 3 sets - 30\" hold  Supine Cross Body Shoulder Stretch - 1 x daily - 7 x weekly - 3 reps - 30\" hold  Standing Elbow Extension with Anchored Resistance - 1 x daily - 7 x weekly - 3 sets - 10 reps  Scaption Wall Slide with Towel - 1 x daily - 7 x weekly - 10 sets - 10\" hold  Standing Single Shoulder Flexion Wall Slide with Palm Up - 1 x daily - 7 x weekly - 10 sets - 10\" hold  Standing Isometric Shoulder Abduction with Doorway - Arm Bent - 1 x daily - 7 x weekly - 10 reps - 5-10\" hold  Standing Isometric Shoulder External Rotation with Doorway - 1 x daily - 7 x weekly - 10 reps - 5-10\" hold  Shoulder External Rotation Reactive Isometrics - 1 x daily - 3-5 x weekly - 10 reps - 10\" hold  Shoulder Internal Rotation Reactive Isometrics - 1 x daily - 3-5 x weekly - 10 reps - 10\" hold  Supine Shoulder Flexion Extension Full Range AROM - 1 x daily - 3-5 x weekly - 3 sets - 10 reps        [x] (54140) Reviewed/Progressed HEP activities related to improving balance, coordination, kinesthetic sense, posture, motor skill, proprioception of scapular, scapulothoracic and UE control with self care, reaching, carrying, lifting, house/yardwork, driving/computer work      Manual Treatments:  PROM / STM / Oscillations-Mobs:  G-I, II, III, IV (PA's, Inf., Post.)  [x] (14956) Provided manual therapy to mobilize soft tissue/joints of cervical/CT, scapular GHJ and UE for the purpose of modulating pain, promoting relaxation,  increasing ROM, reducing/eliminating soft tissue return to 90% functional activities without increased symptoms or restriction. [x] Progressing: [] Met: [] Not Met: [] Adjusted  5. Pt will be able to return to playing siddhartha ball w/o restrictions.(patient specific functional goal)    [x] Progressing: [] Met: [] Not Met: [] Adjusted    Overall Progression Towards Functional goals/ Treatment Progress Update:  [x] Patient is progressing as expected towards functional goals listed. [] Progression is slowed due to complexities/Impairments listed. [] Progression has been slowed due to co-morbidities. [] Plan just implemented, too soon to assess goals progression <30days   [] Goals require adjustment due to lack of progress  [] Patient is not progressing as expected and requires additional follow up with physician  [] Other    ASSESSMENT:      Treatment/Activity Tolerance:  [x] Patient tolerated treatment well [] Patient limited by fatique  [] Patient limited by pain  [] Patient limited by other medical complications  [x] Other: Patient tolerated visit well today. Added band isometric for shoulder IR/ER. Patient reported muscle fatigue and burning, demonstrating rotator cuff weakness, but was able to perform with no pain. Added supine shoulder flexion and patient performed well with no pain. Patient reports \" intense stretch\" feeling with PROM shoulder flexion, abduction, IR and ER but denies pain. PT gave verbal cueing for pt to maintain scapular retraction and avoid rounded shoulder posture with performance of banded shoulder extension. Patient was then able to self-correct. Updated HEP and educated patient to perform strengthening exercises at least 3 times a week and performing more often based on symptoms. Emphasized importance of completing stretches at least once a day, ideally multiple times each day. Continue PT to progress R shoulder A/PROM and R shoulder rotator cuff and parascapular strength in order to allow pt to return to PLOF.     Prognosis: [x] Good [] Fair  [] Poor    Patient Requires Follow-up: [x] Yes  [] No    PLAN: See eval  [x] Continue per plan of care [] Alter current plan (see comments)  [] Plan of care initiated [] Hold pending MD visit [] Discharge    Electronically signed by: Kenneth Dye, PT, DPT, OCS    Gerlean Bamberger, SPT    Therapist was present, directed the patient's care, made skilled judgement, and was responsible for assessment, treatment, and progression of the patient. 7/7/2022       Note: If patient does not return for scheduled/ recommended follow up visits, this note will serve as a discharge from care along with most recent update on progress.

## 2022-07-11 ENCOUNTER — HOSPITAL ENCOUNTER (OUTPATIENT)
Dept: PHYSICAL THERAPY | Age: 57
Setting detail: THERAPIES SERIES
Discharge: HOME | End: 2022-07-11
Payer: COMMERCIAL

## 2022-07-11 PROCEDURE — 97530 THERAPEUTIC ACTIVITIES: CPT

## 2022-07-11 PROCEDURE — 97140 MANUAL THERAPY 1/> REGIONS: CPT

## 2022-07-11 PROCEDURE — 97112 NEUROMUSCULAR REEDUCATION: CPT

## 2022-07-11 PROCEDURE — 97110 THERAPEUTIC EXERCISES: CPT

## 2022-07-11 NOTE — FLOWSHEET NOTE
[address] [facility], [place]         Physical Therapy Daily Treatment Note      Date:  2022    Patient Name:  Sandra Burnett    :  1965  MRN: [id number]  Restrictions/Precautions: prox humeral fx 22  Medical/Treatment Diagnosis Information:  · Diagnosis: S42.201A (ICD-10-CM) - Closed fracture of proximal end of right humerus, unspecified fracture morphology, initial encounter; Z98.890, Z87.81 (ICD-10-CM) - S/P ORIF (open reduction internal fixation) fracture      DOS: 22       OPEN REDUCTION INTERNAL FIXATION PROXIMAL HUMERUS, RIGHT   · Treating Diagnosis: dec ROM in R UE, weakness in R UE, pain in R shoulder     Insurance/Certification information:  PT Insurance Information: R  Physician Information:   Dr. Sixto Quintana  Has the plan of care been signed (Y/N):        [x]  Yes  []  No     Date of Patient follow up with Physician: 22      Is this a Progress Report:     []  Yes  [x]  No        If Yes:  Date Range for reporting period:  Beginnin22  Ending:     Progress report will be due (10 Rx or 30 days whichever is less):       Recertification will be due (POC Duration  / 90 days whichever is less): 22        Visit # Insurance Allowable Auth Required   11 60 []  Yes [x]  No        Functional Scale:    OUTCOME MEASURE DATE SCORE   FOTO Shoulder 22 37   FOTO Shoulder 22 55   FOTO Shoulder 22 64         Latex Allergy:  [x]NO      []YES  Preferred Language for Healthcare:   [x]English       []other:     Pain level:  0-1/10     SUBJECTIVE: Patient reports mild muscle soreness after last visit. Reports he did yard work on Saturday and had no issues with his shoulder. Took rest breaks as needed. Reports cross-body adduction is \"really tight\" `1when he stretches it at home.     · Occupation/School: ; [employer] and walking;  · Living Status/Prior Level of Function: Independent with ADLs and IADLs, unlimited prior to injury in R shoulder;    OBJECTIVE:     6/27/22  ROM PROM AROM  Comment     L R      L R    Flexion   140°        160°  135°     Abduction   105°      175°  85°     ER     68° at 45 deg ABD   75°  68° at 45 deg ABD     IR    52°   85°  50° at 45 deg ABD      Elbow ext        0°     Other                 6/27/22--formal MMT deferred  Strength L R Comment   Flexion Can activate with light manual resistance without pain Can activate with light manual resistance without pain    Abduction  Can activate with light manual resistance without pain  Can activate with light manual resistance without pain     ER  Can activate with light manual resistance without pain  Can activate with light manual resistance without pain     IR  Can activate with light manual resistance without pain  Can activate with light manual resistance without pain     Supraspinatus         Upper Trap         Lower Trap         Mid Trap         Rhomboids         Biceps         Triceps         Horizontal Abduction         Horizontal Adduction         Lats              RESTRICTIONS/PRECAUTIONS: Prox humeral fracture on 5/3/22 w surg on 5/6/22- Aggressive PROM; begin strengthening ~6/29/22  Exercises/Interventions:   Exercise/Equipment Resistance/Repetitions Comments   Cardio/Warm-up     Bike          Stretching/PROM     Wand   10x10\" at 45°       IR behind back  10x10\" Belt up the back    Sleeper Stretch 10x10\"    Cross Body Adduction 10x10\" R sidelying     OH Cane Flexion 10x10\"    Pulleys 10x10\" Flexion, scaption    Wall slides 10x10\" Flexion, scaption       Pec Stretch Doorway 3x30\"          Wrist flex/ext           STRENGTH     Isometrics     Retraction          Weight shift     HEP   HEP   External Rotation 10x10\" blue band    Internal Rotation 10x10\" blue` band     Biceps     Triceps          PRE's        Flexion 3x10 no weight 0-90; supine   Abduction 3x10 no weight 0-90; sidelying        External Rotation sidelying NPV    Internal Rotation Shrugs 3x10; 6# In front of mirror    Push up plus NPV    EXT     Bent over rows  NPV    Reverse Flys     Supine horizontal abduction red band NPV    Prone T's 3x10, R    Serratus punch 3x10 supine    Horizontal Abd with ER     Biceps 3x10; 6#    Triceps             Cable Column/Theraband     External Rotation     Internal Rotation     Shrugs     Lats     Ext 3x10 Black    Flex     Scapular Retraction 3x10 Silver    BIC     TRIC 3x10, Black    PNF          Neuromuscular Re-ed     Dynamic Stability                                                  Manual/Modalities      PROM 8' Flexion, IR, ER, abduction               Therapeutic Exercise and NMR EXR  [x] (40519) Provided verbal/tactile cueing for activities related to strengthening, flexibility, endurance, ROM  for improvements in scapular, scapulothoracic and UE control with self care, reaching, carrying, lifting, house/yardwork, driving/computer work.    [] (33268) Provided verbal/tactile cueing for activities related to improving balance, coordination, kinesthetic sense, posture, motor skill, proprioception  to assist with  scapular, scapulothoracic and UE control with self care, reaching, carrying, lifting, house/yardwork, driving/computer work. Therapeutic Activities:    [x] (20161 or 42726) Provided verbal/tactile cueing for activities related to improving balance, coordination, kinesthetic sense, posture, motor skill, proprioception and motor activation to allow for proper function of scapular, scapulothoracic and UE control with self care, carrying, lifting, driving/computer work. Home Exercise Program:    [x] (23238) Reviewed/Progressed HEP activities related to strengthening, flexibility, endurance, ROM of scapular, scapulothoracic and UE control with self care, reaching, carrying, lifting, house/yardwork, driving/computer work     Written HEP est    Access Code: [email or web address]  URL: [email or web address]  Date: 07/11/2022  Prepared by:  Jacob Gingras    Exercises  Seated Shoulder External Rotation AAROM with Cane and Hand in Neutral - 3 x daily - 7 x weekly - 1 sets - 10 reps - 10 hold  Supine Shoulder External Rotation in 45 Degrees Abduction AAROM with Dowel - 3 x daily - 7 x weekly - 10 reps - 10 hold  Standing Shoulder Internal Rotation AAROM Behind Back with Towel - 3 x daily - 7 x weekly - 1 sets - 10 reps - 10 hold  Seated Scapular Retraction - 1-2 x daily - 7 x weekly - 3 sets - 10 reps  Seated Shoulder Shrugs - 1-2 x daily - 7 x weekly - 3 sets - 10 reps  Seated Bicep Curls Supinated with Dumbbells - 1-2 x daily - 7 x weekly - 3 sets - 10 reps  Doorway Pec Stretch at 90 Degrees Abduction - 1 x daily - 7 x weekly - 3 sets - 30\" hold  Supine Cross Body Shoulder Stretch - 1 x daily - 7 x weekly - 3 reps - 30\" hold  Standing Elbow Extension with Anchored Resistance - 1 x daily - 7 x weekly - 3 sets - 10 reps  Scaption Wall Slide with Towel - 1 x daily - 7 x weekly - 10 sets - 10\" hold  Standing Single Shoulder Flexion Wall Slide with Palm Up - 1 x daily - 7 x weekly - 10 sets - 10\" hold  Standing Isometric Shoulder Abduction with Doorway - Arm Bent - 1 x daily - 7 x weekly - 10 reps - 5-10\" hold  Standing Isometric Shoulder External Rotation with Doorway - 1 x daily - 7 x weekly - 10 reps - 5-10\" hold  Shoulder External Rotation Reactive Isometrics - 1 x daily - 3-5 x weekly - 10 reps - 10\" hold  Shoulder Internal Rotation Reactive Isometrics - 1 x daily - 3-5 x weekly - 10 reps - 10\" hold  Supine Shoulder Flexion Extension Full Range AROM - 1 x daily - 3-5 x weekly - 3 sets - 10 reps  Supine Scapular Protraction in Flexion with Dumbbells - 1 x daily - 3-5 x weekly - 3 sets - 10 reps  Prone Shoulder Horizontal Abduction - 1 x daily - 3-5 x weekly - 3 sets - 10 reps  Sidelying Shoulder Abduction Palm Forward - 1 x daily - 7 x weekly - 3 sets - 10 reps        [x] (57910) Reviewed/Progressed HEP activities related to improving balance, coordination, kinesthetic sense, posture, motor skill, proprioception of scapular, scapulothoracic and UE control with self care, reaching, carrying, lifting, house/yardwork, driving/computer work      Manual Treatments:  PROM / STM / Oscillations-Mobs:  G-I, II, III, IV (PA's, Inf., Post.)  [x] (76746) Provided manual therapy to mobilize soft tissue/joints of cervical/CT, scapular GHJ and UE for the purpose of modulating pain, promoting relaxation,  increasing ROM, reducing/eliminating soft tissue swelling/inflammation/restriction, improving soft tissue extensibility and allowing for proper ROM for normal function with self care, reaching, carrying, lifting, house/yardwork, driving/computer work    Modalities:  CP 15'     [] GAME READY (VASO)- for significant edema, swelling, pain control. Charges:  Timed Code Treatment Minutes: 55   Total Treatment Minutes: 70   [phone]    [] EVAL (LOW) 98722 (typically 20 minutes face-to-face)  [] EVAL (MOD) 17510 (typically 30 minutes face-to-face)  [] EVAL (HIGH) 70012 (typically 45 minutes face-to-face)  [] RE-EVAL   [x] PA(65022) x 1  [] IONTO  [x] NMR (17712) x  1  [] VASO  [x] Manual (30499) x  1   [] Other:  [x] TA (17795) x 1   [] Mech Traction (12902)  [] ES(attended) (97772)      [] ES (un) (84772):     GOALS:  Patient stated goal: regain full strength and function in R shoulder  [x] Progressing: [] Met: [] Not Met: [] Adjusted    Therapist goals for Patient:   Short Term Goals: To be achieved in: 2 weeks  1. Independent in HEP and progression per patient tolerance, in order to prevent re-injury. [] Progressing: [x] Met: [] Not Met: [] Adjusted  2. Patient will have a decrease in pain to facilitate improvement in movement, function, and ADLs as indicated by Functional Deficits. [] Progressing: [x] Met: [] Not Met: [] Adjusted    Long Term Goals: To be achieved in: 12 weeks  1. Disability index score of 67 or more for the FOTO to assist with reaching prior level of function. [x] Progressing: [] Met: [] Not Met: [] Adjusted  2. Patient will demonstrate increased AROM to 170+ elevation, 90 ER, and reach mid back to allow for proper joint functioning as indicated by patients Functional Deficits. [x] Progressing: [] Met: [] Not Met: [] Adjusted  3. Patient will demonstrate an increase in Strength to 5/5 to allow for proper functional mobility as indicated by patients Functional Deficits. [x] Progressing: [] Met: [] Not Met: [] Adjusted  4. Patient will return to 90% functional activities without increased symptoms or restriction. [x] Progressing: [] Met: [] Not Met: [] Adjusted  5. Pt will be able to return to playing siddhartha ball w/o restrictions.(patient specific functional goal)    [x] Progressing: [] Met: [] Not Met: [] Adjusted    Overall Progression Towards Functional goals/ Treatment Progress Update:  [x] Patient is progressing as expected towards functional goals listed. [] Progression is slowed due to complexities/Impairments listed. [] Progression has been slowed due to co-morbidities. [] Plan just implemented, too soon to assess goals progression <30days   [] Goals require adjustment due to lack of progress  [] Patient is not progressing as expected and requires additional follow up with physician  [] Other    ASSESSMENT:      Treatment/Activity Tolerance:  [x] Patient tolerated treatment well [] Patient limited by fatique  [] Patient limited by pain  [] Patient limited by other medical complications  [x] Other: Patient tolerated treatment well today. Added side lying abduction, prone T's and serratus punches this visit. PT gave verbal and tactile cueing for patient to retract and depress scapula before performing shoulder abduction with prone T's. Patient exhibited muscle shakiness and reported fatigue with supine flexion and prone T's, demonstrating rotator cuff weakness.  Patient continues to report \"cramping\" and pain in anterior shoulder with supine cross body adduction stretch. Reported less pain with the stretch performed in side lying and standing. Updated HEP. Continue PT to safely progress pt's R shoulder A/PROM and strength in order to allow for return to PLOF. Prognosis: [x] Good [] Fair  [] Poor    Patient Requires Follow-up: [x] Yes  [] No    PLAN: See eval  [x] Continue per plan of care [] Alter current plan (see comments)  [] Plan of care initiated [] Hold pending MD visit [] Discharge    Electronically signed by: Kenneth Dye, PT, DPT, OCS    Gerlean Bamberger, SPT    Therapist was present, directed the patient's care, made skilled judgement, and was responsible for assessment, treatment, and progression of the patient. 7/11/2022       Note: If patient does not return for scheduled/ recommended follow up visits, this note will serve as a discharge from care along with most recent update on progress.

## 2022-07-14 ENCOUNTER — HOSPITAL ENCOUNTER (OUTPATIENT)
Dept: PHYSICAL THERAPY | Age: 57
Setting detail: THERAPIES SERIES
Discharge: HOME | End: 2022-07-14
Payer: COMMERCIAL

## 2022-07-14 PROCEDURE — 97530 THERAPEUTIC ACTIVITIES: CPT

## 2022-07-14 PROCEDURE — 97110 THERAPEUTIC EXERCISES: CPT

## 2022-07-14 PROCEDURE — 97140 MANUAL THERAPY 1/> REGIONS: CPT

## 2022-07-14 PROCEDURE — 97112 NEUROMUSCULAR REEDUCATION: CPT

## 2022-07-14 NOTE — FLOWSHEET NOTE
[address] [facility], [place]         Physical Therapy Daily Treatment Note      Date:  2022    Patient Name:  Karlos Sales    :  1965  MRN: [id number]  Restrictions/Precautions: prox humeral fx 22  Medical/Treatment Diagnosis Information:  · Diagnosis: S42.201A (ICD-10-CM) - Closed fracture of proximal end of right humerus, unspecified fracture morphology, initial encounter; Z98.890, Z87.81 (ICD-10-CM) - S/P ORIF (open reduction internal fixation) fracture      DOS: 22       OPEN REDUCTION INTERNAL FIXATION PROXIMAL HUMERUS, RIGHT   · Treating Diagnosis: dec ROM in R UE, weakness in R UE, pain in R shoulder     Insurance/Certification information:  PT Insurance Information: R  Physician Information:   Dr. Masood Horvath  Has the plan of care been signed (Y/N):        [x]  Yes  []  No     Date of Patient follow up with Physician: 22      Is this a Progress Report:     []  Yes  [x]  No        If Yes:  Date Range for reporting period:  Beginnin22  Ending:     Progress report will be due (10 Rx or 30 days whichever is less): 28      Recertification will be due (POC Duration  / 90 days whichever is less): 22        Visit # Insurance Allowable Auth Required   12 60 []  Yes [x]  No        Functional Scale:    OUTCOME MEASURE DATE SCORE   FOTO Shoulder 22 37   FOTO Shoulder 22 55   FOTO Shoulder 22 64         Latex Allergy:  [x]NO      []YES  Preferred Language for Healthcare:   [x]English       []other:     Pain level:  0-1/10     SUBJECTIVE: Reports mild soreness in shoulder after last visit. Reports standing cross body adduction stretch with scapula pinned against wall worked better than supine and sidelying.        · Occupation/School: ; [employer] and walking;  · Living Status/Prior Level of Function: Independent with ADLs and IADLs, unlimited prior to injury in R shoulder;    OBJECTIVE:     22  ROM PROM 3x10, red band    Deferred today due to rib soreness add back NPV   Serratus punch 3x10 supine    Horizontal Abd with ER     Biceps 3x10; 6#    Triceps             Cable Column/Theraband     External Rotation     Internal Rotation 3x10, red    Shrugs     Lats     HEP   Flex     HEP   BIC     TRIC 3x10, Black    PNF          Neuromuscular Re-ed     Dynamic Stability                                                  Manual/Modalities      PROM 10' Flexion, IR, ER, abduction               Therapeutic Exercise and NMR EXR  [x] (92471) Provided verbal/tactile cueing for activities related to strengthening, flexibility, endurance, ROM  for improvements in scapular, scapulothoracic and UE control with self care, reaching, carrying, lifting, house/yardwork, driving/computer work.    [] (06171) Provided verbal/tactile cueing for activities related to improving balance, coordination, kinesthetic sense, posture, motor skill, proprioception  to assist with  scapular, scapulothoracic and UE control with self care, reaching, carrying, lifting, house/yardwork, driving/computer work. Therapeutic Activities:    [x] (16532 or 39950) Provided verbal/tactile cueing for activities related to improving balance, coordination, kinesthetic sense, posture, motor skill, proprioception and motor activation to allow for proper function of scapular, scapulothoracic and UE control with self care, carrying, lifting, driving/computer work. Home Exercise Program:    [x] (90417) Reviewed/Progressed HEP activities related to strengthening, flexibility, endurance, ROM of scapular, scapulothoracic and UE control with self care, reaching, carrying, lifting, house/yardwork, driving/computer work     Written HEP est    Access Code: [email or web address]  URL: [email or web address]  Date: 07/14/2022  Prepared by:  Sundar Solorio    Exercises  Supine Shoulder External Rotation in 45 Degrees Abduction AAROM with Dowel - 3 x daily - 7 x weekly - 10 reps - 10 hold  Supine Shoulder External Rotation with Dowel - 3 x daily - 7 x weekly - 3 sets - 10 reps  Sleeper Stretch - 3 x daily - 7 x weekly - 3 sets - 30\" hold  Standing Shoulder Internal Rotation AAROM Behind Back with Towel - 3 x daily - 7 x weekly - 1 sets - 10 reps - 10 hold  Shoulder Flexion Wall Slide with Towel - 2 x daily - 7 x weekly - 10 reps - 10 hold  Scaption Wall Slide with Towel - 2 x daily - 7 x weekly - 10 sets - 10\" hold  Supine Shoulder Flexion Extension AAROM with Dowel - 1 x daily - 7 x weekly - 3 sets - 10 reps  Doorway Pec Stretch at 90 Degrees Abduction - 2 x daily - 7 x weekly - 3 sets - 30\" hold  Standing Cross Body Shoulder Stretch at Wall - 2 x daily - 7 x weekly - 3 sets - 10 reps  Seated Bicep Curls Supinated with Dumbbells - 1-2 x daily - 7 x weekly - 3 sets - 10 reps  Standing Elbow Extension with Anchored Resistance - 1 x daily - 7 x weekly - 3 sets - 10 reps  Supine Shoulder Flexion Extension Full Range AROM - 1 x daily - 3-5 x weekly - 3 sets - 10 reps  Supine Shoulder Horizontal Abduction with Resistance - 1 x daily - 3-5 x weekly - 3 sets - 10 reps  Supine Scapular Protraction in Flexion with Dumbbells - 1 x daily - 3-5 x weekly - 3 sets - 10 reps  Sidelying Shoulder Abduction Palm Forward - 1 x daily - 3-5 x weekly - 3 sets - 10 reps  Standing Shoulder Internal Rotation with Anchored Resistance - 1 x daily - 3-5 x weekly - 3 sets - 10 reps  Sidelying Shoulder External Rotation - 1 x daily - 3-5 x weekly - 3 sets - 10 reps  Prone Shoulder Horizontal Abduction - 1 x daily - 3-5 x weekly - 3 sets - 10 reps      [x] (26595) Reviewed/Progressed HEP activities related to improving balance, coordination, kinesthetic sense, posture, motor skill, proprioception of scapular, scapulothoracic and UE control with self care, reaching, carrying, lifting, house/yardwork, driving/computer work      Manual Treatments:  PROM / STM / Oscillations-Mobs:  G-I, II, III, IV (PA's, Inf., Post.)  [x] (66583) Provided manual therapy to mobilize soft tissue/joints of cervical/CT, scapular GHJ and UE for the purpose of modulating pain, promoting relaxation,  increasing ROM, reducing/eliminating soft tissue swelling/inflammation/restriction, improving soft tissue extensibility and allowing for proper ROM for normal function with self care, reaching, carrying, lifting, house/yardwork, driving/computer work    Modalities:  CP 15'     [] GAME READY (VASO)- for significant edema, swelling, pain control. Charges:  Timed Code Treatment Minutes: 60   Total Treatment Minutes: 75   [phone]    [] EVAL (LOW) 66285 (typically 20 minutes face-to-face)  [] EVAL (MOD) 53019 (typically 30 minutes face-to-face)  [] EVAL (HIGH) 95372 (typically 45 minutes face-to-face)  [] RE-EVAL   [x] DR(79672) x 1  [] IONTO  [x] NMR (55715) x  1  [] VASO  [x] Manual (78137) x  1   [] Other:  [x] TA (84265) x 1   [] Mech Traction (01176)  [] ES(attended) (02742)      [] ES (un) (00408):     GOALS:  Patient stated goal: regain full strength and function in R shoulder  [x] Progressing: [] Met: [] Not Met: [] Adjusted    Therapist goals for Patient:   Short Term Goals: To be achieved in: 2 weeks  1. Independent in HEP and progression per patient tolerance, in order to prevent re-injury. [] Progressing: [x] Met: [] Not Met: [] Adjusted  2. Patient will have a decrease in pain to facilitate improvement in movement, function, and ADLs as indicated by Functional Deficits. [] Progressing: [x] Met: [] Not Met: [] Adjusted    Long Term Goals: To be achieved in: 12 weeks  1. Disability index score of 67 or more for the FOTO to assist with reaching prior level of function. [x] Progressing: [] Met: [] Not Met: [] Adjusted  2. Patient will demonstrate increased AROM to 170+ elevation, 90 ER, and reach mid back to allow for proper joint functioning as indicated by patients Functional Deficits. [x] Progressing: [] Met: [] Not Met: [] Adjusted  3. Patient will demonstrate an increase in Strength to 5/5 to allow for proper functional mobility as indicated by patients Functional Deficits. [x] Progressing: [] Met: [] Not Met: [] Adjusted  4. Patient will return to 90% functional activities without increased symptoms or restriction. [x] Progressing: [] Met: [] Not Met: [] Adjusted  5. Pt will be able to return to playing siddhartha ball w/o restrictions.(patient specific functional goal)    [x] Progressing: [] Met: [] Not Met: [] Adjusted    Overall Progression Towards Functional goals/ Treatment Progress Update:  [x] Patient is progressing as expected towards functional goals listed. [] Progression is slowed due to complexities/Impairments listed. [] Progression has been slowed due to co-morbidities. [] Plan just implemented, too soon to assess goals progression <30days   [] Goals require adjustment due to lack of progress  [] Patient is not progressing as expected and requires additional follow up with physician  [] Other    ASSESSMENT:      Treatment/Activity Tolerance:  [x] Patient tolerated treatment well [] Patient limited by fatique  [] Patient limited by pain  [] Patient limited by other medical complications  [x] Other: Tolerated treatment well today. Progressed ER and IR isometrics to sidelying ER and band resisted IR. Patient tolerated progressions well and denied pain. Patient required verbal and tactile cueing for retraction and depression of the scapula during bent over row but was eventually able to self correct. Patient had difficulty relaxing with shoulder PROM and had moderate muscle guarding. Educated pt to try his best to relax his muscles and avoid fighting the movement. Updated HEP and educated patient on dosage of exercises.  Continue PT to improve R shoulder A/PROM and increase rotator cuff and parascapular musculature     Prognosis: [x] Good [] Fair  [] Poor    Patient Requires Follow-up: [x] Yes  [] No    PLAN: See eval  [x] Continue per plan of care [] Alter current plan (see comments)  [] Plan of care initiated [] Hold pending MD visit [] Discharge    Electronically signed by: Gemma Grande PT, DPT, OCS    DEONTE Renner    Therapist was present, directed the patient's care, made skilled judgement, and was responsible for assessment, treatment, and progression of the patient. 7/14/2022       Note: If patient does not return for scheduled/ recommended follow up visits, this note will serve as a discharge from care along with most recent update on progress.

## 2022-07-18 ENCOUNTER — HOSPITAL ENCOUNTER (OUTPATIENT)
Dept: PHYSICAL THERAPY | Age: 57
Setting detail: THERAPIES SERIES
Discharge: HOME | End: 2022-07-18
Payer: COMMERCIAL

## 2022-07-18 NOTE — FLOWSHEET NOTE
[address]    Physical Therapy  Cancellation/No-show Note  Patient Name:  Virginie Mancilla  :  1965   Date:  2022  Cancelled visits to date:   No-shows to date: 0    For today's appointment patient:  [x]  Cancelled  []  Rescheduled appointment  []  No-show     Reason given by patient:  []  Patient ill  []  Conflicting appointment   []  No transportation    []  Conflict with work  []  No reason given  [x]  Other: Called and reports he strained a muscle during PT last Thursday.      Comments:      Electronically signed by:  Del Mcadams, PT, DPT, OCS

## 2022-07-22 ENCOUNTER — HOSPITAL ENCOUNTER (OUTPATIENT)
Dept: PHYSICAL THERAPY | Age: 57
Setting detail: THERAPIES SERIES
Discharge: HOME | End: 2022-07-22
Payer: COMMERCIAL

## 2022-07-22 PROCEDURE — 97112 NEUROMUSCULAR REEDUCATION: CPT

## 2022-07-22 PROCEDURE — 97140 MANUAL THERAPY 1/> REGIONS: CPT

## 2022-07-22 PROCEDURE — 97110 THERAPEUTIC EXERCISES: CPT

## 2022-07-22 PROCEDURE — 97530 THERAPEUTIC ACTIVITIES: CPT

## 2022-07-22 NOTE — FLOWSHEET NOTE
[address] [facility], [place]         Physical Therapy Daily Treatment Note      Date:  2022    Patient Name:  Virginie Mancilla    :  1965  MRN: [id number]  Restrictions/Precautions: prox humeral fx 22  Medical/Treatment Diagnosis Information:  Diagnosis: U72.462G (ICD-10-CM) - Closed fracture of proximal end of right humerus, unspecified fracture morphology, initial encounter; Z98.890, Z87.81 (ICD-10-CM) - S/P ORIF (open reduction internal fixation) fracture      DOS: 22       OPEN REDUCTION INTERNAL FIXATION PROXIMAL HUMERUS, RIGHT   Treating Diagnosis: dec ROM in R UE, weakness in R UE, pain in R shoulder     Insurance/Certification information:  PT Insurance Information: R  Physician Information:   Dr. Luis Miguel Lau  Has the plan of care been signed (Y/N):        [x]  Yes  []  No     Date of Patient follow up with Physician: 22      Is this a Progress Report:     []  Yes  [x]  No        If Yes:  Date Range for reporting period:  Beginnin22  Ending:     Progress report will be due (10 Rx or 30 days whichever is less): 3/29/10      Recertification will be due (POC Duration  / 90 days whichever is less): 22        Visit # Insurance Allowable Auth Required   13 60 []  Yes [x]  No        Functional Scale:    OUTCOME MEASURE DATE SCORE   FOTO Shoulder 22 37   FOTO Shoulder 22 55   FOTO Shoulder 22 64         Latex Allergy:  [x]NO      []YES  Preferred Language for Healthcare:   [x]English       []other:     Pain level:  0-1/10     SUBJECTIVE: Patient reports he has been sore around his ribs after he rolled on the table last visit and felt a sharp pain. Reports he felt fine after visit on Thursday but was in a lot of pain Thursday night. He has been stretching at home and avoiding strengthening exercises and pain is now improving. Reports he feels like he can move his shoulder \"more normally\" lately.        Occupation/School: IT ; [employer] and walking;  Living Status/Prior Level of Function: Independent with ADLs and IADLs, unlimited prior to injury in R shoulder;    OBJECTIVE:     7/22/22  ROM PROM AROM  Comment     L R      L R    Flexion   141° with OH cane  160°  135     Abduction   105°  175°  85°     ER   68° at 90 deg ABD   75°  68° at 45 deg ABD     IR    Upper R buttock with belt   85°  50° at 45 deg ABD         6/27/22--formal MMT deferred  Strength L R Comment   Flexion Can activate with light manual resistance without pain Can activate with light manual resistance without pain    Abduction  Can activate with light manual resistance without pain  Can activate with light manual resistance without pain     ER  Can activate with light manual resistance without pain  Can activate with light manual resistance without pain     IR  Can activate with light manual resistance without pain  Can activate with light manual resistance without pain     Supraspinatus         Upper Trap         Lower Trap         Mid Trap         Rhomboids         Biceps         Triceps         Horizontal Abduction         Horizontal Adduction         Lats              RESTRICTIONS/PRECAUTIONS: Prox humeral fracture on 5/3/22 w surg on 5/6/22- Aggressive PROM; begin strengthening ~6/29/22  Exercises/Interventions:   Exercise/Equipment Resistance/Repetitions Comments   Cardio/Warm-up     Bike          Stretching/PROM     Wand 10x10\" at 90 degrees       IR behind back  10x10\" Belt up the back    Sleeper Stretch 10x10\"    Cross Body Adduction 10x10\" standing, pin scapula to wall    OH Cane Flexion 10x10\"    Pulleys 10x10\" Flexion, scaption    Wall slides 10x10\" Flexion, scaption       Pec Stretch Doorway 3x30\"          Wrist flex/ext           STRENGTH     Isometrics     Retraction          Weight shift     HEP   HEP         Biceps     Triceps          PRE's        Flexion 3x10 1# 0-90 supine   Abduction 3x10 1# 0-90 sidelying        External Rotation 3x10, 1#    Internal Rotation     IPush up plus     EXT     Bent over rows  3x10, 1#    Reverse Flys     Supine horizontal abduction 3x10, red band    Deferred today due to rib soreness add back NPV   Serratus punch 3x10 supine Add 1# NPV   Horizontal Abd with ER     Biceps 3x10; 6#    Triceps             Cable Column/Theraband     External Rotation     Internal Rotation 3x10, red    Shrugs     Lats     HEP   Flex     HEP   BIC     TRIC 3x10, Black    PNF          Neuromuscular Re-ed     Dynamic Stability     Body blade IR/ER at 0, 3x30\"                                            Manual/Modalities      PROM 10' Flexion, IR, ER, abduction               Therapeutic Exercise and NMR EXR  [x] (84273) Provided verbal/tactile cueing for activities related to strengthening, flexibility, endurance, ROM  for improvements in scapular, scapulothoracic and UE control with self care, reaching, carrying, lifting, house/yardwork, driving/computer work.    [] (60276) Provided verbal/tactile cueing for activities related to improving balance, coordination, kinesthetic sense, posture, motor skill, proprioception  to assist with  scapular, scapulothoracic and UE control with self care, reaching, carrying, lifting, house/yardwork, driving/computer work. Therapeutic Activities:    [x] (90925 or 42251) Provided verbal/tactile cueing for activities related to improving balance, coordination, kinesthetic sense, posture, motor skill, proprioception and motor activation to allow for proper function of scapular, scapulothoracic and UE control with self care, carrying, lifting, driving/computer work. Home Exercise Program:    [x] (41189) Reviewed/Progressed HEP activities related to strengthening, flexibility, endurance, ROM of scapular, scapulothoracic and UE control with self care, reaching, carrying, lifting, house/yardwork, driving/computer work     Written HEP est    Access Code: [email or web address]  URL: [email or web address]  Date: 07/14/2022  Prepared by:  Sundar Solorio    Exercises  Supine Shoulder External Rotation in 45 Degrees Abduction AAROM with Dowel - 3 x daily - 7 x weekly - 10 reps - 10 hold  Supine Shoulder External Rotation with Dowel - 3 x daily - 7 x weekly - 3 sets - 10 reps  Sleeper Stretch - 3 x daily - 7 x weekly - 3 sets - 30\" hold  Standing Shoulder Internal Rotation AAROM Behind Back with Towel - 3 x daily - 7 x weekly - 1 sets - 10 reps - 10 hold  Shoulder Flexion Wall Slide with Towel - 2 x daily - 7 x weekly - 10 reps - 10 hold  Scaption Wall Slide with Towel - 2 x daily - 7 x weekly - 10 sets - 10\" hold  Supine Shoulder Flexion Extension AAROM with Dowel - 1 x daily - 7 x weekly - 3 sets - 10 reps  Doorway Pec Stretch at 90 Degrees Abduction - 2 x daily - 7 x weekly - 3 sets - 30\" hold  Standing Cross Body Shoulder Stretch at Wall - 2 x daily - 7 x weekly - 3 sets - 10 reps  Seated Bicep Curls Supinated with Dumbbells - 1-2 x daily - 7 x weekly - 3 sets - 10 reps  Standing Elbow Extension with Anchored Resistance - 1 x daily - 7 x weekly - 3 sets - 10 reps  Supine Shoulder Flexion Extension Full Range AROM - 1 x daily - 3-5 x weekly - 3 sets - 10 reps  Supine Shoulder Horizontal Abduction with Resistance - 1 x daily - 3-5 x weekly - 3 sets - 10 reps  Supine Scapular Protraction in Flexion with Dumbbells - 1 x daily - 3-5 x weekly - 3 sets - 10 reps  Sidelying Shoulder Abduction Palm Forward - 1 x daily - 3-5 x weekly - 3 sets - 10 reps  Standing Shoulder Internal Rotation with Anchored Resistance - 1 x daily - 3-5 x weekly - 3 sets - 10 reps  Sidelying Shoulder External Rotation - 1 x daily - 3-5 x weekly - 3 sets - 10 reps  Prone Shoulder Horizontal Abduction - 1 x daily - 3-5 x weekly - 3 sets - 10 reps      [x] (50668) Reviewed/Progressed HEP activities related to improving balance, coordination, kinesthetic sense, posture, motor skill, proprioception of scapular, scapulothoracic and UE control with self care, reaching, carrying, lifting, house/yardwork, driving/computer work      Manual Treatments:  PROM / STM / Oscillations-Mobs:  G-I, II, III, IV (PA's, Inf., Post.)  [x] (29134) Provided manual therapy to mobilize soft tissue/joints of cervical/CT, scapular GHJ and UE for the purpose of modulating pain, promoting relaxation,  increasing ROM, reducing/eliminating soft tissue swelling/inflammation/restriction, improving soft tissue extensibility and allowing for proper ROM for normal function with self care, reaching, carrying, lifting, house/yardwork, driving/computer work    Modalities:  CP 15'     [] GAME READY (VASO)- for significant edema, swelling, pain control. Charges:  Timed Code Treatment Minutes: 60   Total Treatment Minutes: 75   [id number]    [] EVAL (LOW) 81543 (typically 20 minutes face-to-face)  [] EVAL (MOD) 24038 (typically 30 minutes face-to-face)  [] EVAL (HIGH) 78376 (typically 45 minutes face-to-face)  [] RE-EVAL   [x] RR(47979) x 1  [] IONTO  [x] NMR (80382) x  1  [] VASO  [x] Manual (58981) x  1   [] Other:  [x] TA (63078) x 1   [] Mech Traction (71209)  [] ES(attended) (70180)      [] ES (un) (28657):     GOALS:  Patient stated goal: regain full strength and function in R shoulder  [x] Progressing: [] Met: [] Not Met: [] Adjusted    Therapist goals for Patient:   Short Term Goals: To be achieved in: 2 weeks  1. Independent in HEP and progression per patient tolerance, in order to prevent re-injury. [] Progressing: [x] Met: [] Not Met: [] Adjusted  2. Patient will have a decrease in pain to facilitate improvement in movement, function, and ADLs as indicated by Functional Deficits. [] Progressing: [x] Met: [] Not Met: [] Adjusted    Long Term Goals: To be achieved in: 12 weeks  1. Disability index score of 67 or more for the FOTO to assist with reaching prior level of function. [x] Progressing: [] Met: [] Not Met: [] Adjusted  2.  Patient will demonstrate increased AROM to 170+ elevation, 90 ER, and reach mid back to allow for proper joint functioning as indicated by patients Functional Deficits. [x] Progressing: [] Met: [] Not Met: [] Adjusted  3. Patient will demonstrate an increase in Strength to 5/5 to allow for proper functional mobility as indicated by patients Functional Deficits. [x] Progressing: [] Met: [] Not Met: [] Adjusted  4. Patient will return to 90% functional activities without increased symptoms or restriction. [x] Progressing: [] Met: [] Not Met: [] Adjusted  5. Pt will be able to return to playing siddhartha ball w/o restrictions.(patient specific functional goal)    [x] Progressing: [] Met: [] Not Met: [] Adjusted    Overall Progression Towards Functional goals/ Treatment Progress Update:  [x] Patient is progressing as expected towards functional goals listed. [] Progression is slowed due to complexities/Impairments listed. [] Progression has been slowed due to co-morbidities. [] Plan just implemented, too soon to assess goals progression <30days   [] Goals require adjustment due to lack of progress  [] Patient is not progressing as expected and requires additional follow up with physician  [] Other    ASSESSMENT:      Treatment/Activity Tolerance:  [x] Patient tolerated treatment well [] Patient limited by fatique  [] Patient limited by pain  [] Patient limited by other medical complications  [x] Other: Patient tolerated treatment well today. A/PROM shoulder flexion, ER and IR are slowly improving and patient understands he needs to perform stretches and AAROM every day. Added body blade IR/ER at 0 degrees today. Patient reported muscle fatigue but denied pain, demonstrating need for further strengthening. Required verbal and tactile cueing during bent over row to perform scapular retraction and depression prior to lifting arm and to avoid upper trap activation. Struggled to self-correct but improved with each set.  Required verbal cue to avoid side--bending during tricep pull down with band resistance and was able to self-correct. Continue PT to increase R shoulder A/PROM and R UE strength in order to allow pt to return to PLOF. Prognosis: [x] Good [] Fair  [] Poor    Patient Requires Follow-up: [x] Yes  [] No    PLAN: See eval  [x] Continue per plan of care [] Alter current plan (see comments)  [] Plan of care initiated [] Hold pending MD visit [] Discharge    Electronically signed by: Gemma Grande PT, DPT, OCS    DEONTE Renner    Therapist was present, directed the patient's care, made skilled judgement, and was responsible for assessment, treatment, and progression of the patient. 7/22/2022       Note: If patient does not return for scheduled/ recommended follow up visits, this note will serve as a discharge from care along with most recent update on progress.

## 2022-07-25 ENCOUNTER — HOSPITAL ENCOUNTER (OUTPATIENT)
Dept: PHYSICAL THERAPY | Age: 57
Setting detail: THERAPIES SERIES
Discharge: HOME | End: 2022-07-25
Payer: COMMERCIAL

## 2022-07-25 PROCEDURE — 97530 THERAPEUTIC ACTIVITIES: CPT

## 2022-07-25 PROCEDURE — 97110 THERAPEUTIC EXERCISES: CPT

## 2022-07-25 PROCEDURE — 97112 NEUROMUSCULAR REEDUCATION: CPT

## 2022-07-25 NOTE — PLAN OF CARE
[address]        Physical Therapy Re-Certification Plan of Care    Dear  Dr. Russell Pires,    We had the pleasure of treating the following patient for physical therapy services at [address] [place] [address]. A summary of our findings can be found in the updated assessment below. This includes our plan of care. If you have any questions or concerns regarding these findings, please do not hesitate to contact me at [phone]. Thank you for the referral.     Physician Signature:________________________________Date:__________________  By signing above (or electronic signature), therapists plan is approved by physician      Overall Response to Treatment:   [x]Patient is responding well to treatment and improvement is noted with regards  to goals   []Patient should continue to improve in reasonable time if they continue HEP   []Patient has plateaued and is no longer responding to skilled PT intervention    []Patient is getting worse and would benefit from return to referring MD   []Patient unable to adhere to initial POC   []Other: Patient is responding well to treatment and is making progress towards established goals. His R shoulder flexion, abduction, ER and IR A/PROM remain limited. Shoulder ER AROM is now within 5 degrees of contralateral side. Flexion, abduction and particularly IR AROM are still significantly less than contralateral side. Patient demonstrated weakness with MMT of R shoulder flexion, abduction, ER and supraspinatus, with mild pain reported during MMT of flexion and IR. Patient demonstrates difficulty holding test position for MMT of shoulder flexion, abduction and supraspinatus without UT activation. Functionally, he reports he is able to perform more ADL's but still feels limited by his shoulder IR AROM and inability to perform overhead tasks.  For these reasons, it is recommended that pt continues PT 2x/week for 4 more weeks in order to increase R shoulder AROM and strengthen R rotator cuff, deltoid and parascapular musculature in order to allow pt to return to PLOF. Date range of Visits: 22-22  Total Visits: 14    Physical Therapy Daily Treatment Note      Date:  2022    Patient Name:  Merna Shaffer    :  1965  MRN: [id number]  Restrictions/Precautions: prox humeral fx 22  Medical/Treatment Diagnosis Information:  Diagnosis: I28.722P (ICD-10-CM) - Closed fracture of proximal end of right humerus, unspecified fracture morphology, initial encounter; Z98.890, Z87.81 (ICD-10-CM) - S/P ORIF (open reduction internal fixation) fracture      DOS: 22       OPEN REDUCTION INTERNAL FIXATION PROXIMAL HUMERUS, RIGHT   Treating Diagnosis: dec ROM in R UE, weakness in R UE, pain in R shoulder     Insurance/Certification information:  PT Insurance Information: [facility]  Physician Information:   Dr. Santos Le  Has the plan of care been signed (Y/N):        [x]  Yes  []  No     Date of Patient follow up with Physician: 22      Is this a Progress Report:     [x]  Yes  []  No        If Yes:  Date Range for reporting period:  Beginnin22  Endin22    Progress report will be due (10 Rx or 30 days whichever is less): 37      Recertification will be due (POC Duration  / 90 days whichever is less): 22        Visit # Insurance Allowable Auth Required   14 60 []  Yes [x]  No        Functional Scale:    OUTCOME MEASURE DATE SCORE   FOTO Shoulder 22 37   FOTO Shoulder 22 55   FOTO Shoulder 22 64   FOTO Shoulder 22 65         Latex Allergy:  [x]NO      []YES  Preferred Language for Healthcare:   [x]English       []other:     Pain level:  0/10     SUBJECTIVE:  Reports he still has on and off pain in his rib. Denies shoulder pain today. Reports HEP is going well.  Bringing R arm behind his back is his most difficult stretch and feels the tightest.       OBJECTIVE:     22  ROM PROM AROM Comment     L R      L R    Flexion   146° with OH cane  160°  141°     Abduction  101°  175°  95°     ER  Check NPV 73° at 90 deg ABD with cane  75°  70° at 90 deg ABD     IR   Check NPV 35° at 90 deg ABD  85°  30° at 90 deg ABD         7/25/22  Strength L R Comment   Flexion 4/5 4-/5* *mild pain  Cue to avoid UT activation   Abduction 5/5 4/5  cue to avoid UT activation   ER 4+/5 4-/5     IR 5/5 4+/5*  *mild pain   Supraspinatus  5/5 4-/5   Cue to avoid UT activation   Upper Trap         Lower Trap         Mid Trap         Rhomboids         Biceps         Triceps         Horizontal Abduction         Horizontal Adduction         Lats             Occupation/School: ; [email or web address] and walking;  Living Status/Prior Level of Function: Independent with ADLs and IADLs, unlimited prior to injury in R shoulder;    RESTRICTIONS/PRECAUTIONS: Prox humeral fracture on 5/3/22 w surg on 5/6/22- Aggressive PROM; begin strengthening ~6/29/22  Exercises/Interventions:   Exercise/Equipment Resistance/Repetitions Comments   Cardio/Warm-up     Bike          Stretching/PROM     Wand 10x10\" at 90 degrees       IR behind back  10x10\" Belt up the back    Sleeper Stretch 10x10\"    Cross Body Adduction 10x10\" standing, pin scapula to wall    OH Cane Flexion 10x10\"    Pulleys 10x10\" Flexion, scaption    Wall slides 10x10\" Flexion, scaption       Pec Stretch Doorway 3x30\"          Wrist flex/ext           STRENGTH     Isometrics     Retraction          Weight shift     HEP   HEP         Biceps     Triceps          PRE's        Flexion 3x10 1# Pain free AROM supine   Abduction 3x10 2# Pain free AROM sidelying        External Rotation 3x10, 1#    Internal Rotation     IPush up plus     EXT     Add back NPV   Reverse Flys     Supine horizontal abduction 3x10, green band    Deferred today due to rib soreness add back NPV   Serratus punch 3x10 supine 1#    Horizontal Abd with ER     add back NPV   Triceps             Cable Column/Theraband     External Rotation     add back NPV   Shrugs     Lats     HEP   Flex     HEP   BIC     add back NPV   PNF          Neuromuscular Re-ed     Dynamic Stability     Body blade IR/ER at 0, 3x30\"                                            Manual/Modalities      add back NPV               Therapeutic Exercise and NMR EXR  [x] (59267) Provided verbal/tactile cueing for activities related to strengthening, flexibility, endurance, ROM  for improvements in scapular, scapulothoracic and UE control with self care, reaching, carrying, lifting, house/yardwork, driving/computer work.    [] (87681) Provided verbal/tactile cueing for activities related to improving balance, coordination, kinesthetic sense, posture, motor skill, proprioception  to assist with  scapular, scapulothoracic and UE control with self care, reaching, carrying, lifting, house/yardwork, driving/computer work. Therapeutic Activities:    [x] (95305 or 22952) Provided verbal/tactile cueing for activities related to improving balance, coordination, kinesthetic sense, posture, motor skill, proprioception and motor activation to allow for proper function of scapular, scapulothoracic and UE control with self care, carrying, lifting, driving/computer work. Home Exercise Program:    [x] (07242) Reviewed/Progressed HEP activities related to strengthening, flexibility, endurance, ROM of scapular, scapulothoracic and UE control with self care, reaching, carrying, lifting, house/yardwork, driving/computer work     Written HEP est    Access Code: [email or web address]  URL: [email or web address]  Date: 07/14/2022  Prepared by:  Sundar Solorio    Exercises  Supine Shoulder External Rotation in 45 Degrees Abduction AAROM with Dowel - 3 x daily - 7 x weekly - 10 reps - 10 hold  Supine Shoulder External Rotation with Dowel - 3 x daily - 7 x weekly - 3 sets - 10 reps  Sleeper Stretch - 3 x daily - 7 x weekly - 3 sets - 30\" hold  Standing Shoulder Internal Rotation AAROM Behind Back with Towel - 3 x daily - 7 x weekly - 1 sets - 10 reps - 10 hold  Shoulder Flexion Wall Slide with Towel - 2 x daily - 7 x weekly - 10 reps - 10 hold  Scaption Wall Slide with Towel - 2 x daily - 7 x weekly - 10 sets - 10\" hold  Supine Shoulder Flexion Extension AAROM with Dowel - 1 x daily - 7 x weekly - 3 sets - 10 reps  Doorway Pec Stretch at 90 Degrees Abduction - 2 x daily - 7 x weekly - 3 sets - 30\" hold  Standing Cross Body Shoulder Stretch at Wall - 2 x daily - 7 x weekly - 3 sets - 10 reps  Seated Bicep Curls Supinated with Dumbbells - 1-2 x daily - 7 x weekly - 3 sets - 10 reps  Standing Elbow Extension with Anchored Resistance - 1 x daily - 7 x weekly - 3 sets - 10 reps  Supine Shoulder Flexion Extension Full Range AROM - 1 x daily - 3-5 x weekly - 3 sets - 10 reps  Supine Shoulder Horizontal Abduction with Resistance - 1 x daily - 3-5 x weekly - 3 sets - 10 reps  Supine Scapular Protraction in Flexion with Dumbbells - 1 x daily - 3-5 x weekly - 3 sets - 10 reps  Sidelying Shoulder Abduction Palm Forward - 1 x daily - 3-5 x weekly - 3 sets - 10 reps  Standing Shoulder Internal Rotation with Anchored Resistance - 1 x daily - 3-5 x weekly - 3 sets - 10 reps  Sidelying Shoulder External Rotation - 1 x daily - 3-5 x weekly - 3 sets - 10 reps  Prone Shoulder Horizontal Abduction - 1 x daily - 3-5 x weekly - 3 sets - 10 reps      [x] (02507) Reviewed/Progressed HEP activities related to improving balance, coordination, kinesthetic sense, posture, motor skill, proprioception of scapular, scapulothoracic and UE control with self care, reaching, carrying, lifting, house/yardwork, driving/computer work      Manual Treatments:  PROM / STM / Oscillations-Mobs:  G-I, II, III, IV (PA's, Inf., Post.)  [x] (03990) Provided manual therapy to mobilize soft tissue/joints of cervical/CT, scapular GHJ and UE for the purpose of modulating pain, promoting relaxation,  increasing ROM, reducing/eliminating soft tissue swelling/inflammation/restriction, improving soft tissue extensibility and allowing for proper ROM for normal function with self care, reaching, carrying, lifting, house/yardwork, driving/computer work    Modalities:  CP 15'     [] GAME READY (VASO)- for significant edema, swelling, pain control. Charges:  Timed Code Treatment Minutes: 40   Total Treatment Minutes: 55   142-696    [] EVAL (LOW) 94253 (typically 20 minutes face-to-face)  [] EVAL (MOD) 25707 (typically 30 minutes face-to-face)  [] EVAL (HIGH) 44738 (typically 45 minutes face-to-face)  [] RE-EVAL   [x] EZ(03943) x 1  [] IONTO  [x] NMR (45155) x  1  [] VASO  [] Manual (65052) x     [] Other:  [x] TA (80143) x 1   [] Mech Traction (02223)  [] ES(attended) (31205)      [] ES (un) (77046):     GOALS:  Patient stated goal: regain full strength and function in R shoulder  [x] Progressing: [] Met: [] Not Met: [] Adjusted    Therapist goals for Patient:   Short Term Goals: To be achieved in: 2 weeks  1. Independent in HEP and progression per patient tolerance, in order to prevent re-injury. [] Progressing: [x] Met: [] Not Met: [] Adjusted  2. Patient will have a decrease in pain to facilitate improvement in movement, function, and ADLs as indicated by Functional Deficits. [] Progressing: [x] Met: [] Not Met: [] Adjusted    Long Term Goals: To be achieved in: 12 weeks  1. Disability index score of 67 or more for the FOTO to assist with reaching prior level of function. [x] Progressing: [] Met: [] Not Met: [] Adjusted  2. Patient will demonstrate increased AROM to 170+ elevation, 90 ER, and reach mid back to allow for proper joint functioning as indicated by patients Functional Deficits. [x] Progressing: [] Met: [] Not Met: [] Adjusted  3. Patient will demonstrate an increase in Strength to 5/5 to allow for proper functional mobility as indicated by patients Functional Deficits.    [x] Progressing: [] Met: [] Not Met: [] Adjusted  4. Patient will return to 90% functional activities without increased symptoms or restriction. [x] Progressing: [] Met: [] Not Met: [] Adjusted  5. Pt will be able to return to playing siddhartha ball w/o restrictions.(patient specific functional goal)    [x] Progressing: [] Met: [] Not Met: [] Adjusted    Overall Progression Towards Functional goals/ Treatment Progress Update:  [x] Patient is progressing as expected towards functional goals listed. [] Progression is slowed due to complexities/Impairments listed. [] Progression has been slowed due to co-morbidities. [] Plan just implemented, too soon to assess goals progression <30days   [] Goals require adjustment due to lack of progress  [] Patient is not progressing as expected and requires additional follow up with physician  [] Other    ASSESSMENT:      Treatment/Activity Tolerance:  [x] Patient tolerated treatment well [] Patient limited by fatique  [] Patient limited by pain  [] Patient limited by other medical complications  [x] Other: See above     Prognosis: [x] Good [] Fair  [] Poor    Patient Requires Follow-up: [x] Yes  [] No    PLAN: 2x/week for 4 weeks 7/25/22  [x] Continue per plan of care [] Alter current plan (see comments)  [] Plan of care initiated [] Hold pending MD visit [] Discharge    Electronically signed by: Carmencita Temple PT, DPT, DEONTE Bailey    Therapist was present, directed the patient's care, made skilled judgement, and was responsible for assessment, treatment, and progression of the patient. 7/25/2022       Note: If patient does not return for scheduled/ recommended follow up visits, this note will serve as a discharge from care along with most recent update on progress.

## 2022-07-29 ENCOUNTER — HOSPITAL ENCOUNTER (OUTPATIENT)
Dept: PHYSICAL THERAPY | Age: 57
Setting detail: THERAPIES SERIES
Discharge: HOME | End: 2022-07-29
Payer: COMMERCIAL

## 2022-07-29 PROCEDURE — 97112 NEUROMUSCULAR REEDUCATION: CPT

## 2022-07-29 PROCEDURE — 97530 THERAPEUTIC ACTIVITIES: CPT

## 2022-07-29 PROCEDURE — 97110 THERAPEUTIC EXERCISES: CPT

## 2022-07-29 PROCEDURE — 97140 MANUAL THERAPY 1/> REGIONS: CPT

## 2022-07-29 NOTE — FLOWSHEET NOTE
[address] [facility], [place]       Physical Therapy Daily Treatment Note      Date:  2022    Patient Name:  Lisa Arshad    :  1965  MRN: [id number]  Restrictions/Precautions: prox humeral fx 22  Medical/Treatment Diagnosis Information:  Diagnosis: T65.060O (ICD-10-CM) - Closed fracture of proximal end of right humerus, unspecified fracture morphology, initial encounter; Z98.890, Z87.81 (ICD-10-CM) - S/P ORIF (open reduction internal fixation) fracture      DOS: 22       OPEN REDUCTION INTERNAL FIXATION PROXIMAL HUMERUS, RIGHT   Treating Diagnosis: dec ROM in R UE, weakness in R UE, pain in R shoulder     Insurance/Certification information:  PT Insurance Information: R  Physician Information:   Dr. Torres Shown  Has the plan of care been signed (Y/N):        [x]  Yes  []  No     Date of Patient follow up with Physician: 22      Is this a Progress Report:     []  Yes  [x]  No        If Yes:  Date Range for reporting period:  Beginnin22  Ending:     Progress report will be due (10 Rx or 30 days whichever is less):       Recertification will be due (POC Duration  / 90 days whichever is less): 22        Visit # Insurance Allowable Auth Required   15 60 []  Yes [x]  No        Functional Scale:    OUTCOME MEASURE DATE SCORE   FOTO Shoulder 22 37   FOTO Shoulder 22 55   FOTO Shoulder 22 64   FOTO Shoulder 22 65         Latex Allergy:  [x]NO      []YES  Preferred Language for Healthcare:   [x]English       []other:     Pain level:  0/10     SUBJECTIVE:  12 weeks PO. Shoulder is slowly feeling better and more \"normal\". Reports rib pain is almost gone and is getting better each day.       OBJECTIVE:     22  ROM PROM AROM  Comment     L R      L R    Flexion   151° with OH cane  160°  141°     Abduction  101°  175°  95°     ER  85 73° at 90 deg ABD with cane  75°  70° at 90 deg ABD     IR 85  53 GH 35° at 90 deg ABD Neuromuscular Re-ed     Dynamic Stability     Body blade IR/ER at 45 deg, 3x30\"                                            Manual/Modalities      PROM 10' Flexion, IR, ER, abduction                 Therapeutic Exercise and NMR EXR  [x] (11845) Provided verbal/tactile cueing for activities related to strengthening, flexibility, endurance, ROM  for improvements in scapular, scapulothoracic and UE control with self care, reaching, carrying, lifting, house/yardwork, driving/computer work.    [] (25583) Provided verbal/tactile cueing for activities related to improving balance, coordination, kinesthetic sense, posture, motor skill, proprioception  to assist with  scapular, scapulothoracic and UE control with self care, reaching, carrying, lifting, house/yardwork, driving/computer work. Therapeutic Activities:    [x] (63305 or 71704) Provided verbal/tactile cueing for activities related to improving balance, coordination, kinesthetic sense, posture, motor skill, proprioception and motor activation to allow for proper function of scapular, scapulothoracic and UE control with self care, carrying, lifting, driving/computer work. Home Exercise Program:    [x] (38876) Reviewed/Progressed HEP activities related to strengthening, flexibility, endurance, ROM of scapular, scapulothoracic and UE control with self care, reaching, carrying, lifting, house/yardwork, driving/computer work     Written HEP est    Access Code: [email or web address]  URL: [email or web address]  Date: 07/14/2022  Prepared by:  Sundar Solorio    Exercises  Supine Shoulder External Rotation in 45 Degrees Abduction AAROM with Dowel - 3 x daily - 7 x weekly - 10 reps - 10 hold  Supine Shoulder External Rotation with Dowel - 3 x daily - 7 x weekly - 3 sets - 10 reps  Sleeper Stretch - 3 x daily - 7 x weekly - 3 sets - 30\" hold  Standing Shoulder Internal Rotation AAROM Behind Back with Towel - 3 x daily - 7 x weekly - 1 sets - 10 reps - 10 hold  Shoulder Flexion Wall Slide with Towel - 2 x daily - 7 x weekly - 10 reps - 10 hold  Scaption Wall Slide with Towel - 2 x daily - 7 x weekly - 10 sets - 10\" hold  Supine Shoulder Flexion Extension AAROM with Dowel - 1 x daily - 7 x weekly - 3 sets - 10 reps  Doorway Pec Stretch at 90 Degrees Abduction - 2 x daily - 7 x weekly - 3 sets - 30\" hold  Standing Cross Body Shoulder Stretch at Wall - 2 x daily - 7 x weekly - 3 sets - 10 reps  Seated Bicep Curls Supinated with Dumbbells - 1-2 x daily - 7 x weekly - 3 sets - 10 reps  Standing Elbow Extension with Anchored Resistance - 1 x daily - 7 x weekly - 3 sets - 10 reps  Supine Shoulder Flexion Extension Full Range AROM - 1 x daily - 3-5 x weekly - 3 sets - 10 reps  Supine Shoulder Horizontal Abduction with Resistance - 1 x daily - 3-5 x weekly - 3 sets - 10 reps  Supine Scapular Protraction in Flexion with Dumbbells - 1 x daily - 3-5 x weekly - 3 sets - 10 reps  Sidelying Shoulder Abduction Palm Forward - 1 x daily - 3-5 x weekly - 3 sets - 10 reps  Standing Shoulder Internal Rotation with Anchored Resistance - 1 x daily - 3-5 x weekly - 3 sets - 10 reps  Sidelying Shoulder External Rotation - 1 x daily - 3-5 x weekly - 3 sets - 10 reps  Prone Shoulder Horizontal Abduction - 1 x daily - 3-5 x weekly - 3 sets - 10 reps      [x] (30358) Reviewed/Progressed HEP activities related to improving balance, coordination, kinesthetic sense, posture, motor skill, proprioception of scapular, scapulothoracic and UE control with self care, reaching, carrying, lifting, house/yardwork, driving/computer work      Manual Treatments:  PROM / STM / Oscillations-Mobs:  G-I, II, III, IV (PA's, Inf., Post.)  [x] (47572) Provided manual therapy to mobilize soft tissue/joints of cervical/CT, scapular GHJ and UE for the purpose of modulating pain, promoting relaxation,  increasing ROM, reducing/eliminating soft tissue swelling/inflammation/restriction, improving soft tissue extensibility and allowing for proper ROM for normal function with self care, reaching, carrying, lifting, house/yardwork, driving/computer work    Modalities:  CP 15'     [] GAME READY (VASO)- for significant edema, swelling, pain control. Charges:  Timed Code Treatment Minutes: 55   Total Treatment Minutes: 70   8-916    [] EVAL (LOW) 46384 (typically 20 minutes face-to-face)  [] EVAL (MOD) 40667 (typically 30 minutes face-to-face)  [] EVAL (HIGH) 01941 (typically 45 minutes face-to-face)  [] RE-EVAL   [x] HM(63343) x 1  [] IONTO  [x] NMR (99922) x  1  [] VASO  [x] Manual (37957) x 1    [] Other:  [x] TA (85328) x 1   [] Mech Traction (63393)  [] ES(attended) (63351)      [] ES (un) (64013):     GOALS:  Patient stated goal: regain full strength and function in R shoulder  [x] Progressing: [] Met: [] Not Met: [] Adjusted    Therapist goals for Patient:   Short Term Goals: To be achieved in: 2 weeks  1. Independent in HEP and progression per patient tolerance, in order to prevent re-injury. [] Progressing: [x] Met: [] Not Met: [] Adjusted  2. Patient will have a decrease in pain to facilitate improvement in movement, function, and ADLs as indicated by Functional Deficits. [] Progressing: [x] Met: [] Not Met: [] Adjusted    Long Term Goals: To be achieved in: 12 weeks  1. Disability index score of 67 or more for the FOTO to assist with reaching prior level of function. [x] Progressing: [] Met: [] Not Met: [] Adjusted  2. Patient will demonstrate increased AROM to 170+ elevation, 90 ER, and reach mid back to allow for proper joint functioning as indicated by patients Functional Deficits. [x] Progressing: [] Met: [] Not Met: [] Adjusted  3. Patient will demonstrate an increase in Strength to 5/5 to allow for proper functional mobility as indicated by patients Functional Deficits. [x] Progressing: [] Met: [] Not Met: [] Adjusted  4. Patient will return to 90% functional activities without increased symptoms or restriction.    [x] Progressing: [] Met: [] Not Met: [] Adjusted  5. Pt will be able to return to playing siddhartha ball w/o restrictions.(patient specific functional goal)    [x] Progressing: [] Met: [] Not Met: [] Adjusted    Overall Progression Towards Functional goals/ Treatment Progress Update:  [x] Patient is progressing as expected towards functional goals listed. [] Progression is slowed due to complexities/Impairments listed. [] Progression has been slowed due to co-morbidities. [] Plan just implemented, too soon to assess goals progression <30days   [] Goals require adjustment due to lack of progress  [] Patient is not progressing as expected and requires additional follow up with physician  [] Other    ASSESSMENT:      Treatment/Activity Tolerance:  [x] Patient tolerated treatment well [] Patient limited by fatique  [] Patient limited by pain  [] Patient limited by other medical complications  [x] Other: Patient tolerated treatment well today. Able to increase weight/resistance with supine flexion, side lying abduction, SA punch, bent over row, IR with band and side lying ER. Patient reported moderate muscle fatigue after session today but denied shoulder pain. R shoulder flexion AAROM increased to 151 degrees today. IR and abduction remain very tight and pt has difficulty relaxing muscles during PROM. Educated patient to start performing stretches 3-4x/day due to ongoing limited shoulder A/PROM. Continue PT to increase R shoulder A/PROM and increase strength of R deltoid, rotator cuff and parascapular musculature.     Prognosis: [x] Good [] Fair  [] Poor    Patient Requires Follow-up: [x] Yes  [] No    PLAN: 2x/week for 4 weeks 7/25/22  [x] Continue per plan of care [] Alter current plan (see comments)  [] Plan of care initiated [] Hold pending MD visit [] Discharge    Electronically signed by: Avelino Werner, PT, DPT, OCS    Jessie Bass, SPT    Therapist was present, directed the patient's care, made skilled judgement, and was responsible for assessment, treatment, and progression of the patient. 7/29/2022       Note: If patient does not return for scheduled/ recommended follow up visits, this note will serve as a discharge from care along with most recent update on progress.

## 2022-08-03 ENCOUNTER — HOSPITAL ENCOUNTER (OUTPATIENT)
Dept: PHYSICAL THERAPY | Age: 57
Setting detail: THERAPIES SERIES
Discharge: HOME | End: 2022-08-03
Payer: COMMERCIAL

## 2022-08-03 PROCEDURE — 97110 THERAPEUTIC EXERCISES: CPT

## 2022-08-03 PROCEDURE — 97140 MANUAL THERAPY 1/> REGIONS: CPT

## 2022-08-03 PROCEDURE — 97530 THERAPEUTIC ACTIVITIES: CPT

## 2022-08-03 PROCEDURE — 97112 NEUROMUSCULAR REEDUCATION: CPT

## 2022-08-03 NOTE — FLOWSHEET NOTE
[address] [facility], [place]       Physical Therapy Daily Treatment Note      Date:  8/3/2022    Patient Name:  Kena Murray    :  1965  MRN: [id number]  Restrictions/Precautions: prox humeral fx 22  Medical/Treatment Diagnosis Information:  Diagnosis: X67.514F (ICD-10-CM) - Closed fracture of proximal end of right humerus, unspecified fracture morphology, initial encounter; Z98.890, Z87.81 (ICD-10-CM) - S/P ORIF (open reduction internal fixation) fracture      DOS: 22       OPEN REDUCTION INTERNAL FIXATION PROXIMAL HUMERUS, RIGHT   Treating Diagnosis: dec ROM in R UE, weakness in R UE, pain in R shoulder     Insurance/Certification information:  PT Insurance Information: [facility]  Physician Information:   Dr. Brian Norman  Has the plan of care been signed (Y/N):        [x]  Yes  []  No     Date of Patient follow up with Physician: 22      Is this a Progress Report:     []  Yes  [x]  No        If Yes:  Date Range for reporting period:  Beginnin22  Ending:     Progress report will be due (10 Rx or 30 days whichever is less): 55      Recertification will be due (POC Duration  / 90 days whichever is less): 22        Visit # Insurance Allowable Auth Required   16 60 []  Yes [x]  No        Functional Scale:    OUTCOME MEASURE DATE SCORE   FOTO Shoulder 22 37   FOTO Shoulder 22 55   FOTO Shoulder 22 64   FOTO Shoulder 22 65         Latex Allergy:  [x]NO      []YES  Preferred Language for Healthcare:   [x]English       []other:     Pain level:  0/10     SUBJECTIVE:  3 months PO. Shoulder doing better today. Slowly improving each week. Has been trying to stretch a bit more often with some of his exercises like cross body adduction.          OBJECTIVE:     8/3/22  ROM PROM AROM  Comment     L R      L R    Flexion   156° with OH cane     Abduction  101°     ER 85 83° at 90 deg ABD with cane     IR 85  53 GH 35° at 90 deg ABD 7/25/22  Strength L R Comment   Flexion 4/5 4-/5* *mild pain  Cue to avoid UT activation   Abduction 5/5 4/5  cue to avoid UT activation   ER 4+/5 4-/5     IR 5/5 4+/5*  *mild pain   Supraspinatus  5/5 4-/5   Cue to avoid UT activation   Upper Trap         Lower Trap         Mid Trap         Rhomboids         Biceps         Triceps         Horizontal Abduction         Horizontal Adduction         Lats             Occupation/School: ; [employer] and [employer];  Living Status/Prior Level of Function: Independent with ADLs and IADLs, unlimited prior to injury in R shoulder;    RESTRICTIONS/PRECAUTIONS: Prox humeral fracture on 5/3/22 w surg on 5/6/22- Aggressive PROM; begin strengthening ~6/29/22  Exercises/Interventions:   Exercise/Equipment Resistance/Repetitions Comments   Cardio/Warm-up     Bike          Stretching/PROM     Cane ER 10x10\" at 45 degrees  10x10\" at 90 degrees       IR behind back  10x10\" Belt up the back    Sleeper Stretch 10x10\"    Cross Body Adduction 3x30\" Pull and turn at wall; cues for scapular depression/retraction first   OH Cane Flexion 10x10\" 1# ankle weight on cane   Pulleys 10x10\" Flexion, scaption    Wall slides 10x10\" Flexion, scaption       Pec Stretch Doorway 3x30\"          Wrist flex/ext           STRENGTH     Isometrics     Retraction          Weight shift     HEP   HEP         Biceps     Triceps          PRE's        Flexion 3x10 0# Standing 0-90, at mirror for technique   Scaption 3x10; 0# Standing 0-90, at mirror for technique   External Rotation 3x10, 2#    Internal Rotation     IPush up plus     EXT     Bent over rows  3x10, 5#    Reverse Flys        Prone row + ER     Prone ext NPV    Serratus punch 3x10 supine 5#    Horizontal Abd with ER        Triceps             Cable Column/Theraband     External Rotation     Internal Rotation 3x10, Blue    Shrugs     Lats     HEP   Flex     HEP   BIC     HEP   PNF          Neuromuscular Re-ed     Dynamic Stability 10 reps - 10 hold  Supine Shoulder External Rotation with Dowel - 3 x daily - 7 x weekly - 10 reps - 10 hold  Shoulder Flexion Wall Slide with Towel - 2 x daily - 7 x weekly - 10 reps - 10 hold  Scaption Wall Slide with Towel - 2 x daily - 7 x weekly - 10 sets - 10\" hold  Supine Shoulder Flexion Extension AAROM with Dowel - 2 x daily - 7 x weekly - 10 reps - 10 hold  Seated Bicep Curls Supinated with Dumbbells - 1 x daily - 4 x weekly - 3 sets - 10 reps  Standing Elbow Extension with Anchored Resistance - 1 x daily - 4 x weekly - 3 sets - 10 reps  Standing Bent Over Single Arm Scapular Row with Table Support - 1 x daily - 4 x weekly - 3 sets - 10 reps  Supine Scapular Protraction in Flexion with Dumbbells - 1 x daily - 4 x weekly - 3 sets - 10 reps  Standing Shoulder Internal Rotation with Anchored Resistance - 1 x daily - 4 x weekly - 3 sets - 10 reps  Sidelying Shoulder External Rotation - 1 x daily - 4 x weekly - 3 sets - 10 reps  Seated Shoulder Flexion - 1 x daily - 7 x weekly - 3 sets - 10 reps  Seated Shoulder Empty Can AROM - 1 x daily - 7 x weekly - 3 sets - 10 reps      [x] (09354) Reviewed/Progressed HEP activities related to improving balance, coordination, kinesthetic sense, posture, motor skill, proprioception of scapular, scapulothoracic and UE control with self care, reaching, carrying, lifting, house/yardwork, driving/computer work      Manual Treatments:  PROM / STM / Oscillations-Mobs:  G-I, II, III, IV (PA's, Inf., Post.)  [x] (57694) Provided manual therapy to mobilize soft tissue/joints of cervical/CT, scapular GHJ and UE for the purpose of modulating pain, promoting relaxation,  increasing ROM, reducing/eliminating soft tissue swelling/inflammation/restriction, improving soft tissue extensibility and allowing for proper ROM for normal function with self care, reaching, carrying, lifting, house/yardwork, driving/computer work      Modalities:  CP 15'     [] GAME READY (VASO)- for significant edema, swelling, pain control. Charges:  Timed Code Treatment Minutes: 55   Total Treatment Minutes: 70   245-400    [] EVAL (LOW) 16311 (typically 20 minutes face-to-face)  [] EVAL (MOD) 59050 (typically 30 minutes face-to-face)  [] EVAL (HIGH) 21758 (typically 45 minutes face-to-face)  [] RE-EVAL   [x] ES(90471) x 1  [] IONTO  [x] NMR (80845) x  1  [] VASO  [x] Manual (29739) x 1    [] Other:  [x] TA (69173) x 1   [] Mech Traction (78539)  [] ES(attended) (41404)      [] ES (un) (29468):     GOALS:  Patient stated goal: regain full strength and function in R shoulder  [x] Progressing: [] Met: [] Not Met: [] Adjusted    Therapist goals for Patient:   Short Term Goals: To be achieved in: 2 weeks  1. Independent in HEP and progression per patient tolerance, in order to prevent re-injury. [] Progressing: [x] Met: [] Not Met: [] Adjusted  2. Patient will have a decrease in pain to facilitate improvement in movement, function, and ADLs as indicated by Functional Deficits. [] Progressing: [x] Met: [] Not Met: [] Adjusted    Long Term Goals: To be achieved in: 12 weeks  1. Disability index score of 67 or more for the FOTO to assist with reaching prior level of function. [x] Progressing: [] Met: [] Not Met: [] Adjusted  2. Patient will demonstrate increased AROM to 170+ elevation, 90 ER, and reach mid back to allow for proper joint functioning as indicated by patients Functional Deficits. [x] Progressing: [] Met: [] Not Met: [] Adjusted  3. Patient will demonstrate an increase in Strength to 5/5 to allow for proper functional mobility as indicated by patients Functional Deficits. [x] Progressing: [] Met: [] Not Met: [] Adjusted  4. Patient will return to 90% functional activities without increased symptoms or restriction. [x] Progressing: [] Met: [] Not Met: [] Adjusted  5.  Pt will be able to return to playing siddhartha ball w/o restrictions.(patient specific functional goal)    [x] Progressing: [] Met: [] Not Met: [] Adjusted    Overall Progression Towards Functional goals/ Treatment Progress Update:  [x] Patient is progressing as expected towards functional goals listed. [] Progression is slowed due to complexities/Impairments listed. [] Progression has been slowed due to co-morbidities. [] Plan just implemented, too soon to assess goals progression <30days   [] Goals require adjustment due to lack of progress  [] Patient is not progressing as expected and requires additional follow up with physician  [] Other    ASSESSMENT:      Treatment/Activity Tolerance:  [x] Patient tolerated treatment well [] Patient limited by fatique  [] Patient limited by pain  [] Patient limited by other medical complications  [x] Other: Patient tolerated treatment well today. ER and flexion ROM improving with stiffness > muscle guarding at end ranges. Most limited into IR so emphasized posterior capsule and sleeper stretch at home. Strength improving as evident by progressing to upright flexion and scaption against gravity. He did fatigue quickly and need tactile cues from PT as well as visual cues from mirror to avoid shrugging. Unable to get to 90 degrees of scaption due to weakness/fatigue. Continue PT to increase R shoulder A/PROM and increase strength of R deltoid, rotator cuff and parascapular musculature. Prognosis: [x] Good [] Fair  [] Poor    Patient Requires Follow-up: [x] Yes  [] No    PLAN: 2x/week for 4 weeks 7/25/22  [x] Continue per plan of care [] Alter current plan (see comments)  [] Plan of care initiated [] Hold pending MD visit [] Discharge    Electronically signed by: Stefan Gaines, PT, DPT, OCS    Note: If patient does not return for scheduled/ recommended follow up visits, this note will serve as a discharge from care along with most recent update on progress.

## 2022-08-05 ENCOUNTER — HOSPITAL ENCOUNTER (OUTPATIENT)
Dept: PHYSICAL THERAPY | Age: 57
Setting detail: THERAPIES SERIES
Discharge: HOME | End: 2022-08-05
Payer: COMMERCIAL

## 2022-08-05 PROCEDURE — 97530 THERAPEUTIC ACTIVITIES: CPT

## 2022-08-05 PROCEDURE — 97112 NEUROMUSCULAR REEDUCATION: CPT

## 2022-08-05 PROCEDURE — 97140 MANUAL THERAPY 1/> REGIONS: CPT

## 2022-08-05 PROCEDURE — 97110 THERAPEUTIC EXERCISES: CPT

## 2022-08-05 NOTE — FLOWSHEET NOTE
*mild pain  Cue to avoid UT activation   Abduction 5/5 4/5  cue to avoid UT activation   ER 4+/5 4-/5     IR 5/5 4+/5*  *mild pain   Supraspinatus  5/5 4-/5   Cue to avoid UT activation   Upper Trap         Lower Trap         Mid Trap         Rhomboids         Biceps         Triceps         Horizontal Abduction         Horizontal Adduction         Lats             Occupation/School: ; [employer] and walking;  Living Status/Prior Level of Function: Independent with ADLs and IADLs, unlimited prior to injury in R shoulder;    RESTRICTIONS/PRECAUTIONS: Prox humeral fracture on 5/3/22 w surg on 5/6/22- Aggressive PROM; begin strengthening ~6/29/22  Exercises/Interventions:   Exercise/Equipment Resistance/Repetitions Comments   Cardio/Warm-up     Bike          Stretching/PROM     Cane ER 10x10\" at 45 degrees  10x10\" at 90 degrees       IR behind back  10x10\" Belt up the back    Sleeper Stretch 10x10\"    Cross Body Adduction 3x30\" Pull and turn at wall; cues for scapular depression/retraction first   OH Cane Flexion 10x10\" 1# ankle weight on cane      Wall slides 10x10\" Flexion, scaption       Pec Stretch Doorway 3x30\"    Foam roll v ups 10x10\"       Wrist flex/ext           STRENGTH     Isometrics     Retraction          Weight shift     HEP   HEP         Biceps     Triceps          PRE's        Flexion 3x10 0# Standing 0-90, at mirror for technique   Scaption 3x10; 0# Standing 0-90, at mirror for technique   External Rotation 3x10, 3#    Internal Rotation     IPush up plus     EXT     Bent over rows  3x10, 5#    Reverse Flys        Prone row + ER     Prone ext NPV-or when able to lay prone    Serratus punch 3x10 supine 5#    Horizontal Abd with ER        Triceps             Cable Column/Theraband     External Rotation     Internal Rotation 3x15, Blue    Shrugs     Lats     HEP   Flex     HEP   BIC     HEP   PNF          Neuromuscular Re-ed     Dynamic Stability             Wall climbers Green, 3x5 Manual/Modalities      PROM 5' , IR, ,    [address] Joint Mobs 5' Posterior, lateral and long-axis traction; Grade III/IV          Therapeutic Exercise and NMR EXR  [x] (40328) Provided verbal/tactile cueing for activities related to strengthening, flexibility, endurance, ROM  for improvements in scapular, scapulothoracic and UE control with self care, reaching, carrying, lifting, house/yardwork, driving/computer work.    [] (73477) Provided verbal/tactile cueing for activities related to improving balance, coordination, kinesthetic sense, posture, motor skill, proprioception  to assist with  scapular, scapulothoracic and UE control with self care, reaching, carrying, lifting, house/yardwork, driving/computer work. Therapeutic Activities:    [x] (90028 or 44556) Provided verbal/tactile cueing for activities related to improving balance, coordination, kinesthetic sense, posture, motor skill, proprioception and motor activation to allow for proper function of scapular, scapulothoracic and UE control with self care, carrying, lifting, driving/computer work. Home Exercise Program:    [x] (61584) Reviewed/Progressed HEP activities related to strengthening, flexibility, endurance, ROM of scapular, scapulothoracic and UE control with self care, reaching, carrying, lifting, house/yardwork, driving/computer work     Written HEP est    Access Code: [email or web address]  URL: [email or web address]  Date: 08/03/2022  Prepared by:  Sundar Solorio    Exercises  Doorway Pec Stretch at 90 Degrees Abduction - 3 x daily - 7 x weekly - 3 sets - 30\" hold  Doorway Rhomboid Stretch - 3 x daily - 7 x weekly - 10 reps - 10 hold  Sleeper Stretch - 3 x daily - 7 x weekly - 3 sets - 30\" hold  Standing Shoulder Internal Rotation AAROM Behind Back with Towel - 3 x daily - 7 x weekly - 1 sets - 10 reps - 10 hold  Supine Shoulder External Rotation in 45 Degrees Abduction AAROM with Dowel - 3 x daily - 7 x weekly - 10 reps - 10 hold  Supine Shoulder External Rotation with Dowel - 3 x daily - 7 x weekly - 10 reps - 10 hold  Shoulder Flexion Wall Slide with Towel - 2 x daily - 7 x weekly - 10 reps - 10 hold  Scaption Wall Slide with Towel - 2 x daily - 7 x weekly - 10 sets - 10\" hold  Supine Shoulder Flexion Extension AAROM with Dowel - 2 x daily - 7 x weekly - 10 reps - 10 hold  Seated Bicep Curls Supinated with Dumbbells - 1 x daily - 4 x weekly - 3 sets - 10 reps  Standing Elbow Extension with Anchored Resistance - 1 x daily - 4 x weekly - 3 sets - 10 reps  Standing Bent Over Single Arm Scapular Row with Table Support - 1 x daily - 4 x weekly - 3 sets - 10 reps  Supine Scapular Protraction in Flexion with Dumbbells - 1 x daily - 4 x weekly - 3 sets - 10 reps  Standing Shoulder Internal Rotation with Anchored Resistance - 1 x daily - 4 x weekly - 3 sets - 10 reps  Sidelying Shoulder External Rotation - 1 x daily - 4 x weekly - 3 sets - 10 reps  Seated Shoulder Flexion - 1 x daily - 7 x weekly - 3 sets - 10 reps  Seated Shoulder Empty Can AROM - 1 x daily - 7 x weekly - 3 sets - 10 reps      [x] (03072) Reviewed/Progressed HEP activities related to improving balance, coordination, kinesthetic sense, posture, motor skill, proprioception of scapular, scapulothoracic and UE control with self care, reaching, carrying, lifting, house/yardwork, driving/computer work      Manual Treatments:  PROM / STM / Oscillations-Mobs:  G-I, II, III, IV (PA's, Inf., Post.)  [x] (42250) Provided manual therapy to mobilize soft tissue/joints of cervical/CT, scapular GHJ and UE for the purpose of modulating pain, promoting relaxation,  increasing ROM, reducing/eliminating soft tissue swelling/inflammation/restriction, improving soft tissue extensibility and allowing for proper ROM for normal function with self care, reaching, carrying, lifting, house/yardwork, driving/computer work      Modalities:  CP 15'     [] GAME READY (VASO)- for significant edema, swelling, pain control. Charges:  Timed Code Treatment Minutes: 48'   Total Treatment Minutes: 76'   759-911    [] EVAL (LOW) 75438 (typically 20 minutes face-to-face)  [] EVAL (MOD) 36852 (typically 30 minutes face-to-face)  [] EVAL (HIGH) 45530 (typically 45 minutes face-to-face)  [] RE-EVAL   [x] HZ(68307) x 1  [] IONTO  [x] NMR (04172) x  1  [] VASO  [x] Manual (22716) x 1    [] Other:  [x] TA (11875) x 1   [] Mech Traction (35799)  [] ES(attended) (65274)      [] ES (un) (87076):     GOALS:  Patient stated goal: regain full strength and function in R shoulder  [x] Progressing: [] Met: [] Not Met: [] Adjusted    Therapist goals for Patient:   Short Term Goals: To be achieved in: 2 weeks  1. Independent in HEP and progression per patient tolerance, in order to prevent re-injury. [] Progressing: [x] Met: [] Not Met: [] Adjusted  2. Patient will have a decrease in pain to facilitate improvement in movement, function, and ADLs as indicated by Functional Deficits. [] Progressing: [x] Met: [] Not Met: [] Adjusted    Long Term Goals: To be achieved in: 12 weeks  1. Disability index score of 67 or more for the FOTO to assist with reaching prior level of function. [x] Progressing: [] Met: [] Not Met: [] Adjusted  2. Patient will demonstrate increased AROM to 170+ elevation, 90 ER, and reach mid back to allow for proper joint functioning as indicated by patients Functional Deficits. [x] Progressing: [] Met: [] Not Met: [] Adjusted  3. Patient will demonstrate an increase in Strength to 5/5 to allow for proper functional mobility as indicated by patients Functional Deficits. [x] Progressing: [] Met: [] Not Met: [] Adjusted  4. Patient will return to 90% functional activities without increased symptoms or restriction. [x] Progressing: [] Met: [] Not Met: [] Adjusted  5.  Pt will be able to return to playing siddhartha ball w/o restrictions.(patient specific functional goal)    [x] Progressing: [] Met: [] Not Met: [] Adjusted    Overall Progression Towards Functional goals/ Treatment Progress Update:  [x] Patient is progressing as expected towards functional goals listed. [] Progression is slowed due to complexities/Impairments listed. [] Progression has been slowed due to co-morbidities. [] Plan just implemented, too soon to assess goals progression <30days   [] Goals require adjustment due to lack of progress  [] Patient is not progressing as expected and requires additional follow up with physician  [] Other    ASSESSMENT:      Treatment/Activity Tolerance:  [x] Patient tolerated treatment well [] Patient limited by fatique  [] Patient limited by pain  [] Patient limited by other medical complications  [x] Other: Tolerated treatment well today. Progressed weight with side lying ER, demonstrating increased rotator cuff strength. Added wall climbers with green band and patient reported moderate fatigue after completing 3 sets of 5 but denied pain. Demonstrated good awareness of upper trap activation during standing flexion in front of the mirror and was able to self-correct without cueing. Continue PT to increase R shoulder A/PROM and strengthen R deltoid, RC and parascapular musculature in order to allow pt to return to PLOF. Prognosis: [x] Good [] Fair  [] Poor    Patient Requires Follow-up: [x] Yes  [] No    PLAN: 2x/week for 4 weeks 7/25/22  [x] Continue per plan of care [] Alter current plan (see comments)  [] Plan of care initiated [] Hold pending MD visit [] Discharge    Electronically signed by: Luis Miguel Santiago, PT, DPT, OCS  Victor Manuel Blancas, SPT    Note: If patient does not return for scheduled/ recommended follow up visits, this note will serve as a discharge from care along with most recent update on progress.

## 2022-08-08 ENCOUNTER — HOSPITAL ENCOUNTER (OUTPATIENT)
Dept: PHYSICAL THERAPY | Age: 57
Setting detail: THERAPIES SERIES
Discharge: HOME | End: 2022-08-08
Payer: COMMERCIAL

## 2022-08-08 PROCEDURE — 97140 MANUAL THERAPY 1/> REGIONS: CPT

## 2022-08-08 PROCEDURE — 97110 THERAPEUTIC EXERCISES: CPT

## 2022-08-08 PROCEDURE — 97112 NEUROMUSCULAR REEDUCATION: CPT

## 2022-08-08 PROCEDURE — 97530 THERAPEUTIC ACTIVITIES: CPT

## 2022-08-08 NOTE — FLOWSHEET NOTE
[address] [facility], [place]       Physical Therapy Daily Treatment Note      Date:  2022    Patient Name:  Davonte Bernstein    :  1965  MRN: [id number]  Restrictions/Precautions: prox humeral fx 22  Medical/Treatment Diagnosis Information:  Diagnosis: U20.022T (ICD-10-CM) - Closed fracture of proximal end of right humerus, unspecified fracture morphology, initial encounter; Z98.890, Z87.81 (ICD-10-CM) - S/P ORIF (open reduction internal fixation) fracture      DOS: 22       OPEN REDUCTION INTERNAL FIXATION PROXIMAL HUMERUS, RIGHT   Treating Diagnosis: dec ROM in R UE, weakness in R UE, pain in R shoulder     Insurance/Certification information:  PT Insurance Information: [facility]  Physician Information:   Dr. Bony Grant  Has the plan of care been signed (Y/N):        [x]  Yes  []  No     Date of Patient follow up with Physician: 22      Is this a Progress Report:     []  Yes  [x]  No        If Yes:  Date Range for reporting period:  Beginnin22  Ending:     Progress report will be due (10 Rx or 30 days whichever is less):       Recertification will be due (POC Duration  / 90 days whichever is less): 22        Visit # Insurance Allowable Auth Required   18 60 []  Yes [x]  No        Functional Scale:    OUTCOME MEASURE DATE SCORE   FOTO Shoulder 22 37   FOTO Shoulder 22 55   FOTO Shoulder 22 64   FOTO Shoulder 22 65   FOTO Shoulder 22 70         Latex Allergy:  [x]NO      []YES  Preferred Language for Healthcare:   [x]English       []other:     Pain level:  0-1/10     SUBJECTIVE:  3 months PO. Reports he was pretty sore in his R shoulder muscles and felt some \"aching\" and occasional \"sharp\" pains in his R shoulder the day after his last PT session. Reports the soreness was relieved by the next day. His shoulder feels good today.       OBJECTIVE:     22  ROM PROM AROM  Comment     L R      L R    Flexion   156° with OH cane     Abduction  101° 120° scaption wall slide     ER 85 83° at 90 deg ABD with cane     IR 85  53 GH 35° at 90 deg ABD        7/25/22  Strength L R Comment   Flexion 4/5 4-/5* *mild pain  Cue to avoid UT activation   Abduction 5/5 4/5  cue to avoid UT activation   ER 4+/5 4-/5     IR 5/5 4+/5*  *mild pain   Supraspinatus  5/5 4-/5   Cue to avoid UT activation   Upper Trap         Lower Trap         Mid Trap         Rhomboids         Biceps         Triceps         Horizontal Abduction         Horizontal Adduction         Lats             Occupation/School: ; [employer] and walking;  Living Status/Prior Level of Function: Independent with ADLs and IADLs, unlimited prior to injury in R shoulder;    RESTRICTIONS/PRECAUTIONS: Prox humeral fracture on 5/3/22 w surg on 5/6/22- Aggressive PROM; begin strengthening ~6/29/22  Exercises/Interventions:   Exercise/Equipment Resistance/Repetitions Comments   Cardio/Warm-up     Bike          Stretching/PROM     Cane ER 10x10\" at 45 degrees  10x10\" at 90 degrees       IR behind back  10x10\" Belt up the back    Sleeper Stretch 10x10\"    Cross Body Adduction 3x30\" Pull and turn at wall; cues for scapular depression/retraction first   OH Cane Flexion 10x10\" 1# ankle weight on cane      Wall slides 10x10\" Flexion, scaption       Pec Stretch Doorway 3x30\"    Foam roll v ups 10x10\"       Wrist flex/ext           STRENGTH     Isometrics     Retraction          Weight shift     HEP   HEP         Biceps     Triceps          PRE's        Flexion 3x10 1# Standing 0-90, at mirror for technique   Scaption 3x10; 1# Standing 0-90, at mirror for technique   External Rotation 3x10, 3#    Internal Rotation     IPush up plus     EXT     Bent over rows  3x10, 5#    Reverse Flys        Prone T's 3x10, R on blue SB Prone row + ER     Prone ext 3x10, R, on blue SB    Serratus punch 3x10 supine 5#    Horizontal Abd with ER        Triceps             Cable Column/Theraband External Rotation     Internal Rotation 3x10, Black    Shrugs     Lats     HEP   Flex     HEP   BIC     HEP   PNF          Neuromuscular Re-ed     Dynamic Stability                                              Manual/Modalities      PROM 5' , IR, ,    [address] Joint Mobs 5' Posterior, lateral and long-axis traction; Grade III/IV          Therapeutic Exercise and NMR EXR  [x] (08973) Provided verbal/tactile cueing for activities related to strengthening, flexibility, endurance, ROM  for improvements in scapular, scapulothoracic and UE control with self care, reaching, carrying, lifting, house/yardwork, driving/computer work.    [] (43519) Provided verbal/tactile cueing for activities related to improving balance, coordination, kinesthetic sense, posture, motor skill, proprioception  to assist with  scapular, scapulothoracic and UE control with self care, reaching, carrying, lifting, house/yardwork, driving/computer work. Therapeutic Activities:    [x] (61772 or 97178) Provided verbal/tactile cueing for activities related to improving balance, coordination, kinesthetic sense, posture, motor skill, proprioception and motor activation to allow for proper function of scapular, scapulothoracic and UE control with self care, carrying, lifting, driving/computer work. Home Exercise Program:    [x] (85752) Reviewed/Progressed HEP activities related to strengthening, flexibility, endurance, ROM of scapular, scapulothoracic and UE control with self care, reaching, carrying, lifting, house/yardwork, driving/computer work     Written HEP est    Access Code: [email or web address]  URL: [email or web address]  Date: 08/03/2022  Prepared by:  Sundar Solorio    Exercises  Doorway Pec Stretch at 90 Degrees Abduction - 3 x daily - 7 x weekly - 3 sets - 30\" hold  Doorway Rhomboid Stretch - 3 x daily - 7 x weekly - 10 reps - 10 hold  Sleeper Stretch - 3 x daily - 7 x weekly - 3 sets - 30\" hold  Standing Shoulder Internal Rotation AAROM Behind Back with Towel - 3 x daily - 7 x weekly - 1 sets - 10 reps - 10 hold  Supine Shoulder External Rotation in 45 Degrees Abduction AAROM with Dowel - 3 x daily - 7 x weekly - 10 reps - 10 hold  Supine Shoulder External Rotation with Dowel - 3 x daily - 7 x weekly - 10 reps - 10 hold  Shoulder Flexion Wall Slide with Towel - 2 x daily - 7 x weekly - 10 reps - 10 hold  Scaption Wall Slide with Towel - 2 x daily - 7 x weekly - 10 sets - 10\" hold  Supine Shoulder Flexion Extension AAROM with Dowel - 2 x daily - 7 x weekly - 10 reps - 10 hold  Seated Bicep Curls Supinated with Dumbbells - 1 x daily - 4 x weekly - 3 sets - 10 reps  Standing Elbow Extension with Anchored Resistance - 1 x daily - 4 x weekly - 3 sets - 10 reps  Standing Bent Over Single Arm Scapular Row with Table Support - 1 x daily - 4 x weekly - 3 sets - 10 reps  Supine Scapular Protraction in Flexion with Dumbbells - 1 x daily - 4 x weekly - 3 sets - 10 reps  Standing Shoulder Internal Rotation with Anchored Resistance - 1 x daily - 4 x weekly - 3 sets - 10 reps  Sidelying Shoulder External Rotation - 1 x daily - 4 x weekly - 3 sets - 10 reps  Seated Shoulder Flexion - 1 x daily - 7 x weekly - 3 sets - 10 reps  Seated Shoulder Empty Can AROM - 1 x daily - 7 x weekly - 3 sets - 10 reps      [x] (72244) Reviewed/Progressed HEP activities related to improving balance, coordination, kinesthetic sense, posture, motor skill, proprioception of scapular, scapulothoracic and UE control with self care, reaching, carrying, lifting, house/yardwork, driving/computer work      Manual Treatments:  PROM / STM / Oscillations-Mobs:  G-I, II, III, IV (PA's, Inf., Post.)  [x] (07967) Provided manual therapy to mobilize soft tissue/joints of cervical/CT, scapular GHJ and UE for the purpose of modulating pain, promoting relaxation,  increasing ROM, reducing/eliminating soft tissue swelling/inflammation/restriction, improving soft tissue extensibility and allowing for proper ROM for normal function with self care, reaching, carrying, lifting, house/yardwork, driving/computer work      Modalities:  CP 15'     [] GAME READY (VASO)- for significant edema, swelling, pain control. Charges:  Timed Code Treatment Minutes: 54'   Total Treatment Minutes: 70'   148-308    [] EVAL (LOW) 80260 (typically 20 minutes face-to-face)  [] EVAL (MOD) 73240 (typically 30 minutes face-to-face)  [] EVAL (HIGH) 03064 (typically 45 minutes face-to-face)  [] RE-EVAL   [x] EQ(51593) x 1  [] IONTO  [x] NMR (17304) x  1  [] VASO  [x] Manual (95649) x 1    [] Other:  [x] TA (96385) x 1   [] Mech Traction (61760)  [] ES(attended) (66711)      [] ES (un) (36902):     GOALS:  Patient stated goal: regain full strength and function in R shoulder  [x] Progressing: [] Met: [] Not Met: [] Adjusted    Therapist goals for Patient:   Short Term Goals: To be achieved in: 2 weeks  1. Independent in HEP and progression per patient tolerance, in order to prevent re-injury. [] Progressing: [x] Met: [] Not Met: [] Adjusted  2. Patient will have a decrease in pain to facilitate improvement in movement, function, and ADLs as indicated by Functional Deficits. [] Progressing: [x] Met: [] Not Met: [] Adjusted    Long Term Goals: To be achieved in: 12 weeks  1. Disability index score of 67 or more for the FOTO to assist with reaching prior level of function. [] Progressing: [x] Met: [] Not Met: [] Adjusted  2. Patient will demonstrate increased AROM to 170+ elevation, 90 ER, and reach mid back to allow for proper joint functioning as indicated by patients Functional Deficits. [x] Progressing: [] Met: [] Not Met: [] Adjusted  3. Patient will demonstrate an increase in Strength to 5/5 to allow for proper functional mobility as indicated by patients Functional Deficits. [x] Progressing: [] Met: [] Not Met: [] Adjusted  4. Patient will return to 90% functional activities without increased symptoms or restriction.    [x] Progressing: [] Met: [] Not Met: [] Adjusted  5. Pt will be able to return to playing siddhartha ball w/o restrictions.(patient specific functional goal)    [x] Progressing: [] Met: [] Not Met: [] Adjusted    Overall Progression Towards Functional goals/ Treatment Progress Update:  [x] Patient is progressing as expected towards functional goals listed. [] Progression is slowed due to complexities/Impairments listed. [] Progression has been slowed due to co-morbidities. [] Plan just implemented, too soon to assess goals progression <30days   [] Goals require adjustment due to lack of progress  [] Patient is not progressing as expected and requires additional follow up with physician  [] Other    ASSESSMENT:      Treatment/Activity Tolerance:  [x] Patient tolerated treatment well [] Patient limited by fatique  [] Patient limited by pain  [] Patient limited by other medical complications  [x] Other: Continues to tolerate treatment well. Less muscle guarding and restriction felt with IR PROM today. Patient reported moderate fatigue with prone shoulder ext and prone T's on SB, demonstrating parascapular muscle weakness. Able to progress standing shoulder flexion and scaption to 1# without UT activation or pain. Continue PT to increase R shoulder A/PROM and strengthen R RC, deltoid and parascapular muscles to allow pt to return to PLOF. Prognosis: [x] Good [] Fair  [] Poor    Patient Requires Follow-up: [x] Yes  [] No    PLAN: 2x/week for 4 weeks 7/25/22  [x] Continue per plan of care [] Alter current plan (see comments)  [] Plan of care initiated [] Hold pending MD visit [] Discharge    Electronically signed by: Luis Miguel Santiago PT, DPT, OCS  Victor Manuel Blancas, SPT    Note: If patient does not return for scheduled/ recommended follow up visits, this note will serve as a discharge from care along with most recent update on progress.

## 2022-08-12 ENCOUNTER — HOSPITAL ENCOUNTER (OUTPATIENT)
Dept: PHYSICAL THERAPY | Age: 57
Setting detail: THERAPIES SERIES
Discharge: HOME | End: 2022-08-12
Payer: COMMERCIAL

## 2022-08-12 PROCEDURE — 97112 NEUROMUSCULAR REEDUCATION: CPT

## 2022-08-12 PROCEDURE — 97530 THERAPEUTIC ACTIVITIES: CPT

## 2022-08-12 PROCEDURE — 97140 MANUAL THERAPY 1/> REGIONS: CPT

## 2022-08-12 PROCEDURE — 97110 THERAPEUTIC EXERCISES: CPT

## 2022-08-12 NOTE — FLOWSHEET NOTE
[address] [facility], [place]       Physical Therapy Daily Treatment Note      Date:  2022    Patient Name:  Davonte Bernstein    :  1965  MRN: [id number]  Restrictions/Precautions: prox humeral fx 22  Medical/Treatment Diagnosis Information:  Diagnosis: X59.934B (ICD-10-CM) - Closed fracture of proximal end of right humerus, unspecified fracture morphology, initial encounter; Z98.890, Z87.81 (ICD-10-CM) - S/P ORIF (open reduction internal fixation) fracture      DOS: 22       OPEN REDUCTION INTERNAL FIXATION PROXIMAL HUMERUS, RIGHT   Treating Diagnosis: dec ROM in R UE, weakness in R UE, pain in R shoulder     Insurance/Certification information:  PT Insurance Information: R  Physician Information:   Dr. Bony Grant  Has the plan of care been signed (Y/N):        [x]  Yes  []  No     Date of Patient follow up with Physician: 22      Is this a Progress Report:     []  Yes  [x]  No        If Yes:  Date Range for reporting period:  Beginnin22  Ending:     Progress report will be due (10 Rx or 30 days whichever is less): 27      Recertification will be due (POC Duration  / 90 days whichever is less): 22        Visit # Insurance Allowable Auth Required   19 60 []  Yes [x]  No        Functional Scale:    OUTCOME MEASURE DATE SCORE   FOTO Shoulder 22 37   FOTO Shoulder 22 55   FOTO Shoulder 22 64   FOTO Shoulder 22 65   FOTO Shoulder 22 70         Latex Allergy:  [x]NO      []YES  Preferred Language for Healthcare:   [x]English       []other:     Pain level:  0-1/10     SUBJECTIVE:  14 weeks PO. Reports some mild soreness after last session but shoulder feels good today.        OBJECTIVE:     22  ROM PROM AROM  Comment     L R      L R    Flexion   158° with OH cane 146°     Abduction  101° 120° scaption wall slide     ER 85 83° at 90 deg ABD with cane     IR 85  53 GH 35° at 90 deg ABD        22  Strength L R Comment   Flexion 4/5 4-/5* *mild pain  Cue to avoid UT activation   Abduction 5/5 4/5  cue to avoid UT activation   ER 4+/5 4-/5     IR 5/5 4+/5*  *mild pain   Supraspinatus  5/5 4-/5   Cue to avoid UT activation   Upper Trap         Lower Trap         Mid Trap         Rhomboids         Biceps         Triceps         Horizontal Abduction         Horizontal Adduction         Lats             Occupation/School: ; [employer] and walking;  Living Status/Prior Level of Function: Independent with ADLs and IADLs, unlimited prior to injury in R shoulder;    RESTRICTIONS/PRECAUTIONS: Prox humeral fracture on 5/3/22 w surg on 5/6/22- Aggressive PROM; begin strengthening ~6/29/22  Exercises/Interventions:   Exercise/Equipment Resistance/Repetitions Comments   Cardio/Warm-up     Bike          Stretching/PROM     Cane ER 10x10\" at 45 degrees  10x10\" at 90 degrees       IR behind back  10x10\" Belt up the back    Sleeper Stretch 10x10\"    Cross Body Adduction 3x30\" Pull and turn at wall; cues for scapular depression/retraction first   OH Cane Flexion 10x10\" 1# ankle weight on cane      Wall slides 10x10\" Flexion, scaption       Pec Stretch Doorway 3x30\"    Foam roll v ups 3x10 + red band around wrists       Wrist flex/ext           STRENGTH     Isometrics     Retraction          Weight shift     HEP   HEP         Biceps     Triceps          PRE's        Flexion 3x10 1# Standing 0-90, at mirror for technique   Scaption 3x10; 1# Standing 0-90, at mirror for technique   External Rotation 3x10, 3#    Internal Rotation     IPush up plus     EXT        Reverse Flys        Prone T's 3x10, R on blue SB, 1# Prone row + ER     Prone ext 3x10, R, on blue SB, 1#       Horizontal Abd with ER        Triceps        Push up plus at wall 3x10    Cable Column/Theraband     External Rotation     Internal Rotation 3x10, Black    Shrugs     Lats     HEP   Flex     HEP   BIC     HEP   PNF          Neuromuscular Re-ed     Dynamic Stability                                              Manual/Modalities      PROM 5' , IR, ,    [facility] Joint Mobs 5' Posterior, lateral and long-axis traction; Grade III/IV          Therapeutic Exercise and NMR EXR  [x] (33521) Provided verbal/tactile cueing for activities related to strengthening, flexibility, endurance, ROM  for improvements in scapular, scapulothoracic and UE control with self care, reaching, carrying, lifting, house/yardwork, driving/computer work.    [] (17929) Provided verbal/tactile cueing for activities related to improving balance, coordination, kinesthetic sense, posture, motor skill, proprioception  to assist with  scapular, scapulothoracic and UE control with self care, reaching, carrying, lifting, house/yardwork, driving/computer work. Therapeutic Activities:    [x] (16692 or 40727) Provided verbal/tactile cueing for activities related to improving balance, coordination, kinesthetic sense, posture, motor skill, proprioception and motor activation to allow for proper function of scapular, scapulothoracic and UE control with self care, carrying, lifting, driving/computer work. Home Exercise Program:    [x] (47164) Reviewed/Progressed HEP activities related to strengthening, flexibility, endurance, ROM of scapular, scapulothoracic and UE control with self care, reaching, carrying, lifting, house/yardwork, driving/computer work     Written HEP est    Access Code: [email or web address]  URL: [email or web address]  Date: 08/03/2022  Prepared by:  Sundar Solorio    Exercises  Doorway Pec Stretch at 90 Degrees Abduction - 3 x daily - 7 x weekly - 3 sets - 30\" hold  Doorway Rhomboid Stretch - 3 x daily - 7 x weekly - 10 reps - 10 hold  Sleeper Stretch - 3 x daily - 7 x weekly - 3 sets - 30\" hold  Standing Shoulder Internal Rotation AAROM Behind Back with Towel - 3 x daily - 7 x weekly - 1 sets - 10 reps - 10 hold  Supine Shoulder External Rotation in 45 Degrees Abduction AAROM with Dowel - 3 x daily - 7 x weekly - 10 reps - 10 hold  Supine Shoulder External Rotation with Dowel - 3 x daily - 7 x weekly - 10 reps - 10 hold  Shoulder Flexion Wall Slide with Towel - 2 x daily - 7 x weekly - 10 reps - 10 hold  Scaption Wall Slide with Towel - 2 x daily - 7 x weekly - 10 sets - 10\" hold  Supine Shoulder Flexion Extension AAROM with Dowel - 2 x daily - 7 x weekly - 10 reps - 10 hold  Seated Bicep Curls Supinated with Dumbbells - 1 x daily - 4 x weekly - 3 sets - 10 reps  Standing Elbow Extension with Anchored Resistance - 1 x daily - 4 x weekly - 3 sets - 10 reps  Standing Bent Over Single Arm Scapular Row with Table Support - 1 x daily - 4 x weekly - 3 sets - 10 reps  Supine Scapular Protraction in Flexion with Dumbbells - 1 x daily - 4 x weekly - 3 sets - 10 reps  Standing Shoulder Internal Rotation with Anchored Resistance - 1 x daily - 4 x weekly - 3 sets - 10 reps  Sidelying Shoulder External Rotation - 1 x daily - 4 x weekly - 3 sets - 10 reps  Seated Shoulder Flexion - 1 x daily - 7 x weekly - 3 sets - 10 reps  Seated Shoulder Empty Can AROM - 1 x daily - 7 x weekly - 3 sets - 10 reps      [x] (68631) Reviewed/Progressed HEP activities related to improving balance, coordination, kinesthetic sense, posture, motor skill, proprioception of scapular, scapulothoracic and UE control with self care, reaching, carrying, lifting, house/yardwork, driving/computer work      Manual Treatments:  PROM / STM / Oscillations-Mobs:  G-I, II, III, IV (PA's, Inf., Post.)  [x] (25155) Provided manual therapy to mobilize soft tissue/joints of cervical/CT, scapular GHJ and UE for the purpose of modulating pain, promoting relaxation,  increasing ROM, reducing/eliminating soft tissue swelling/inflammation/restriction, improving soft tissue extensibility and allowing for proper ROM for normal function with self care, reaching, carrying, lifting, house/yardwork, driving/computer work      Modalities:  CP 15'     [] GAME READY (VASO)- for significant edema, swelling, pain control. Charges:  Timed Code Treatment Minutes: 54'   Total Treatment Minutes: 70'   [id number]    [] EVAL (LOW) 455 1011 (typically 20 minutes face-to-face)  [] EVAL (MOD) 68467 (typically 30 minutes face-to-face)  [] EVAL (HIGH) 63780 (typically 45 minutes face-to-face)  [] RE-EVAL   [x] KZ(19887) x 1  [] IONTO  [x] NMR (83270) x  1  [] VASO  [x] Manual (89042) x 1    [] Other:  [x] TA (35003) x 1   [] Mech Traction (57009)  [] ES(attended) (50443)      [] ES (un) (19325):     GOALS:  Patient stated goal: regain full strength and function in R shoulder  [x] Progressing: [] Met: [] Not Met: [] Adjusted    Therapist goals for Patient:   Short Term Goals: To be achieved in: 2 weeks  1. Independent in HEP and progression per patient tolerance, in order to prevent re-injury. [] Progressing: [x] Met: [] Not Met: [] Adjusted  2. Patient will have a decrease in pain to facilitate improvement in movement, function, and ADLs as indicated by Functional Deficits. [] Progressing: [x] Met: [] Not Met: [] Adjusted    Long Term Goals: To be achieved in: 12 weeks  1. Disability index score of 67 or more for the FOTO to assist with reaching prior level of function. [] Progressing: [x] Met: [] Not Met: [] Adjusted  2. Patient will demonstrate increased AROM to 170+ elevation, 90 ER, and reach mid back to allow for proper joint functioning as indicated by patients Functional Deficits. [x] Progressing: [] Met: [] Not Met: [] Adjusted  3. Patient will demonstrate an increase in Strength to 5/5 to allow for proper functional mobility as indicated by patients Functional Deficits. [x] Progressing: [] Met: [] Not Met: [] Adjusted  4. Patient will return to 90% functional activities without increased symptoms or restriction. [x] Progressing: [] Met: [] Not Met: [] Adjusted  5.  Pt will be able to return to playing siddhartha ball w/o restrictions.(patient specific functional goal)    [x] Progressing: [] Met: [] Not Met: [] Adjusted    Overall Progression Towards Functional goals/ Treatment Progress Update:  [x] Patient is progressing as expected towards functional goals listed. [] Progression is slowed due to complexities/Impairments listed. [] Progression has been slowed due to co-morbidities. [] Plan just implemented, too soon to assess goals progression <30days   [] Goals require adjustment due to lack of progress  [] Patient is not progressing as expected and requires additional follow up with physician  [] Other    ASSESSMENT:      Treatment/Activity Tolerance:  [x] Patient tolerated treatment well [] Patient limited by fatique  [] Patient limited by pain  [] Patient limited by other medical complications  [x] Other: Tolerated treatment well today. R shoulder flexion and ER A/PROM are improving each week. R shoulder IR A/PROM continues to be the most difficult for pt to improve. Added 1# to prone T's and extension and patient reported moderate muscle fatigue, demonstrating need for further parascapular strengthening. Able to perform push up plus at the wall for the first time and did not have pain. Continue PT to increase R shoulder A/PROM and strengthen R deltoid, RC and parascapular musculature in order to safely return pt to PLOF. Prognosis: [x] Good [] Fair  [] Poor    Patient Requires Follow-up: [x] Yes  [] No    PLAN: 2x/week for 4 weeks 7/25/22  [x] Continue per plan of care [] Alter current plan (see comments)  [] Plan of care initiated [] Hold pending MD visit [] Discharge    Consider assessing thoracic mobility and potentially performing mobilization NPV- 8/12/22    Electronically signed by: Ashish Starks, PT, DPT, OCS  Michaela Hebert, SPT    Note: If patient does not return for scheduled/ recommended follow up visits, this note will serve as a discharge from care along with most recent update on progress.

## 2022-08-15 ENCOUNTER — HOSPITAL ENCOUNTER (OUTPATIENT)
Dept: PHYSICAL THERAPY | Age: 57
Setting detail: THERAPIES SERIES
Discharge: HOME | End: 2022-08-15
Payer: COMMERCIAL

## 2022-08-15 PROCEDURE — 97110 THERAPEUTIC EXERCISES: CPT

## 2022-08-15 PROCEDURE — 97530 THERAPEUTIC ACTIVITIES: CPT

## 2022-08-15 PROCEDURE — 97140 MANUAL THERAPY 1/> REGIONS: CPT

## 2022-08-15 PROCEDURE — 97112 NEUROMUSCULAR REEDUCATION: CPT

## 2022-08-15 NOTE — FLOWSHEET NOTE
[address] [facility], [place]       Physical Therapy Daily Treatment Note      Date:  8/15/2022    Patient Name:  Mary Rae    :  1965  MRN: [id number]  Restrictions/Precautions: prox humeral fx 22  Medical/Treatment Diagnosis Information:  Diagnosis: Y00.348M (ICD-10-CM) - Closed fracture of proximal end of right humerus, unspecified fracture morphology, initial encounter; Z98.890, Z87.81 (ICD-10-CM) - S/P ORIF (open reduction internal fixation) fracture      DOS: 22       OPEN REDUCTION INTERNAL FIXATION PROXIMAL HUMERUS, RIGHT   Treating Diagnosis: dec ROM in R UE, weakness in R UE, pain in R shoulder     Insurance/Certification information:  PT Insurance Information: R  Physician Information:   Dr. Linn Wilhelm  Has the plan of care been signed (Y/N):        [x]  Yes  []  No     Date of Patient follow up with Physician: 22      Is this a Progress Report:     []  Yes  [x]  No        If Yes:  Date Range for reporting period:  Beginnin22  Ending:     Progress report will be due (10 Rx or 30 days whichever is less): 42      Recertification will be due (POC Duration  / 90 days whichever is less): 22        Visit # Insurance Allowable Auth Required   20 60 []  Yes [x]  No        Functional Scale:    OUTCOME MEASURE DATE SCORE   FOTO Shoulder 22 37   FOTO Shoulder 22 55   FOTO Shoulder 22 64   FOTO Shoulder 22 65   FOTO Shoulder 22 70         Latex Allergy:  [x]NO      []YES  Preferred Language for Healthcare:   [x]English       []other:     Pain level:  0-1/10     SUBJECTIVE:  3.5 months PO. Reports a bit of fatigue this morning. Had a busy weekend using arm a lot including smashing avocados to make guacamole.       OBJECTIVE:     8/15/22  ROM PROM AROM  Comment     L R      L R    Flexion   161° with OH cane 146°     Abduction  101° 120° scaption wall slide     ER 85 84° at 90 deg ABD with cane     IR 85  53 GH 35° at 90 deg ABD        7/25/22  Strength L R Comment   Flexion 4/5 4-/5* *mild pain  Cue to avoid UT activation   Abduction 5/5 4/5  cue to avoid UT activation   ER 4+/5 4-/5     IR 5/5 4+/5*  *mild pain   Supraspinatus  5/5 4-/5   Cue to avoid UT activation   Upper Trap         Lower Trap         Mid Trap         Rhomboids         Biceps         Triceps         Horizontal Abduction         Horizontal Adduction         Lats             Occupation/School: ; [employer] and walking;  Living Status/Prior Level of Function: Independent with ADLs and IADLs, unlimited prior to injury in R shoulder;    RESTRICTIONS/PRECAUTIONS: Prox humeral fracture on 5/3/22 w surg on 5/6/22- Aggressive PROM; begin strengthening ~6/29/22  Exercises/Interventions:   Exercise/Equipment Resistance/Repetitions Comments   Cardio/Warm-up     Bike          Stretching/PROM     Cane ER 10x10\" at 45 degrees  10x10\" at 90 degrees       IR behind back  10x10\" Belt up the back At wall to block shoulder  from tipping forward   Sleeper Stretch 10x10\"    Cross Body Adduction 3x30\" Pull and turn at wall; cues for scapular depression/retraction first   OH Cane Flexion 10x10\" 1# ankle weight on cane      Wall slides 10x10\" Flexion, scaption       Pec Stretch Doorway 3x30\"          Wrist flex/ext           STRENGTH     Isometrics     Retraction          Weight shift     HEP   HEP         Biceps     Triceps          PRE's        Flexion 3x10 1# Standing 0-90, at mirror for technique   Scaption 3x10; 1# Standing 0-90, at mirror for technique   External Rotation 3x10, 3#    Internal Rotation     IPush up plus     EXT        Reverse Flys        Prone T's 3x10, R on blue SB, 1# Prone row + ER     Prone ext 3x10, R, on blue SB, 1#       Horizontal Abd with ER        Triceps        Push up plus at wall 3x10         Cable Column/Theraband     External Rotation     Internal Rotation 3x10, Silver    Shrugs     Lats     HEP   Flex     HEP   BIC     HEP PNF          Neuromuscular Re-ed     Dynamic Stability                BoW x30ea up/down, L/R, Cw, Ccw Purple ball in 90 deg flex                            Manual/Modalities      PROM 5' , IR, ,    [address] Joint Mobs 5' Posterior, lateral and long-axis traction; Grade III/IV          Therapeutic Exercise and NMR EXR  [x] (67917) Provided verbal/tactile cueing for activities related to strengthening, flexibility, endurance, ROM  for improvements in scapular, scapulothoracic and UE control with self care, reaching, carrying, lifting, house/yardwork, driving/computer work.    [] (96692) Provided verbal/tactile cueing for activities related to improving balance, coordination, kinesthetic sense, posture, motor skill, proprioception  to assist with  scapular, scapulothoracic and UE control with self care, reaching, carrying, lifting, house/yardwork, driving/computer work. Therapeutic Activities:    [x] (80093 or 05414) Provided verbal/tactile cueing for activities related to improving balance, coordination, kinesthetic sense, posture, motor skill, proprioception and motor activation to allow for proper function of scapular, scapulothoracic and UE control with self care, carrying, lifting, driving/computer work. Home Exercise Program:    [x] (08685) Reviewed/Progressed HEP activities related to strengthening, flexibility, endurance, ROM of scapular, scapulothoracic and UE control with self care, reaching, carrying, lifting, house/yardwork, driving/computer work     Written HEP est    Access Code: [email or web address]  URL: [email or web address]  Date: 08/15/2022  Prepared by:  Sundar Solorio    Exercises  Doorway Pec Stretch at 90 Degrees Abduction - 3 x daily - 7 x weekly - 3 sets - 30\" hold  Doorway Rhomboid Stretch - 3 x daily - 7 x weekly - 10 reps - 10 hold  Sleeper Stretch - 3 x daily - 7 x weekly - 3 sets - 30\" hold  Standing Shoulder Internal Rotation AAROM Behind Back with Towel - 3 x daily - 7 x weekly - 1 sets - 10 reps - 10 hold  Supine Shoulder External Rotation in 45 Degrees Abduction AAROM with Dowel - 3 x daily - 7 x weekly - 10 reps - 10 hold  Supine Shoulder External Rotation with Dowel - 3 x daily - 7 x weekly - 10 reps - 10 hold  Shoulder Flexion Wall Slide with Towel - 2 x daily - 7 x weekly - 10 reps - 10 hold  Scaption Wall Slide with Towel - 2 x daily - 7 x weekly - 10 sets - 10\" hold  Supine Shoulder Flexion Extension AAROM with Dowel - 2 x daily - 7 x weekly - 10 reps - 10 hold  Seated Bicep Curls Supinated with Dumbbells - 1 x daily - 4 x weekly - 3 sets - 10 reps  Standing Elbow Extension with Anchored Resistance - 1 x daily - 4 x weekly - 3 sets - 10 reps  Standing Bent Over Single Arm Scapular Row with Table Support - 1 x daily - 4 x weekly - 3 sets - 10 reps  Supine Scapular Protraction in Flexion with Dumbbells - 1 x daily - 4 x weekly - 3 sets - 10 reps  Standing Shoulder Internal Rotation with Anchored Resistance - 1 x daily - 4 x weekly - 3 sets - 10 reps  Sidelying Shoulder External Rotation - 1 x daily - 4 x weekly - 3 sets - 10 reps  Seated Shoulder Flexion - 1 x daily - 4 x weekly - 3 sets - 10 reps  Seated Shoulder Empty Can AROM - 1 x daily - 4 x weekly - 3 sets - 10 reps  Supine Chest Stretch with Elbows Bent - 3 x daily - 7 x weekly - 3 sets - 30 hold  Standing Wall [employer] with Mini Swiss Ball - 1 x daily - 4 x weekly - 3 sets - 10 reps        [x] (72896) Reviewed/Progressed HEP activities related to improving balance, coordination, kinesthetic sense, posture, motor skill, proprioception of scapular, scapulothoracic and UE control with self care, reaching, carrying, lifting, house/yardwork, driving/computer work      Manual Treatments:  PROM / STM / Oscillations-Mobs:  G-I, II, III, IV (PA's, Inf., Post.)  [x] (01437) Provided manual therapy to mobilize soft tissue/joints of cervical/CT, scapular GHJ and UE for the purpose of modulating pain, promoting relaxation,  increasing ROM, reducing/eliminating soft tissue swelling/inflammation/restriction, improving soft tissue extensibility and allowing for proper ROM for normal function with self care, reaching, carrying, lifting, house/yardwork, driving/computer work      Modalities:  CP 15'     [] GAME READY (VASO)- for significant edema, swelling, pain control. Charges:  Timed Code Treatment Minutes: 61'   Total Treatment Minutes: 75'   830-948    [] EVAL (LOW) 87299 (typically 20 minutes face-to-face)  [] EVAL (MOD) 69640 (typically 30 minutes face-to-face)  [] EVAL (HIGH) 11069 (typically 45 minutes face-to-face)  [] RE-EVAL   [x] FS(63617) x 1  [] IONTO  [x] NMR (09305) x  1  [] VASO  [x] Manual (19901) x 1    [] Other:  [x] TA (54609) x 1   [] Mech Traction (81965)  [] ES(attended) (48777)      [] ES (un) (04211):     GOALS:  Patient stated goal: regain full strength and function in R shoulder  [x] Progressing: [] Met: [] Not Met: [] Adjusted    Therapist goals for Patient:   Short Term Goals: To be achieved in: 2 weeks  1. Independent in HEP and progression per patient tolerance, in order to prevent re-injury. [] Progressing: [x] Met: [] Not Met: [] Adjusted  2. Patient will have a decrease in pain to facilitate improvement in movement, function, and ADLs as indicated by Functional Deficits. [] Progressing: [x] Met: [] Not Met: [] Adjusted    Long Term Goals: To be achieved in: 12 weeks  1. Disability index score of 67 or more for the FOTO to assist with reaching prior level of function. [] Progressing: [x] Met: [] Not Met: [] Adjusted  2. Patient will demonstrate increased AROM to 170+ elevation, 90 ER, and reach mid back to allow for proper joint functioning as indicated by patients Functional Deficits. [x] Progressing: [] Met: [] Not Met: [] Adjusted  3. Patient will demonstrate an increase in Strength to 5/5 to allow for proper functional mobility as indicated by patients Functional Deficits.    [x] Progressing: [] Met: [] Not Met: [] Adjusted  4. Patient will return to 90% functional activities without increased symptoms or restriction. [x] Progressing: [] Met: [] Not Met: [] Adjusted  5. Pt will be able to return to playing siddhartha ball w/o restrictions.(patient specific functional goal)    [x] Progressing: [] Met: [] Not Met: [] Adjusted    Overall Progression Towards Functional goals/ Treatment Progress Update:  [x] Patient is progressing as expected towards functional goals listed. [] Progression is slowed due to complexities/Impairments listed. [] Progression has been slowed due to co-morbidities. [] Plan just implemented, too soon to assess goals progression <30days   [] Goals require adjustment due to lack of progress  [] Patient is not progressing as expected and requires additional follow up with physician  [] Other    ASSESSMENT:      Treatment/Activity Tolerance:  [x] Patient tolerated treatment well [] Patient limited by fatique  [] Patient limited by pain  [] Patient limited by other medical complications  [x] Other: Tolerated treatment well today. Still most limited and tight into IR but improves slightly with posterior GH joint mobs. Cues needed during s/lr ER to keep scapula stabilized and isolate [facility] rotation. Most fatigued today by [facility] activities push up plus and BoW exercises. HEP updated to include thoracic mobility / pec stretch. Continue PT to increase R shoulder A/PROM and strengthen R deltoid, RC and parascapular musculature in order to safely return pt to PLOF.      Prognosis: [x] Good [] Fair  [] Poor    Patient Requires Follow-up: [x] Yes  [] No    PLAN: 2x/week for 4 weeks 7/25/22  [x] Continue per plan of care [] Alter current plan (see comments)  [] Plan of care initiated [] Hold pending MD visit [] Discharge      Electronically signed by: Frederic Means, PT, DPT, OCS    Note: If patient does not return for scheduled/ recommended follow up visits, this note will serve as a discharge from care along with most recent update on progress.

## 2022-08-18 ENCOUNTER — HOSPITAL ENCOUNTER (OUTPATIENT)
Dept: PHYSICAL THERAPY | Age: 57
Setting detail: THERAPIES SERIES
Discharge: HOME | End: 2022-08-18
Payer: COMMERCIAL

## 2022-08-18 PROCEDURE — 97530 THERAPEUTIC ACTIVITIES: CPT

## 2022-08-18 PROCEDURE — 97112 NEUROMUSCULAR REEDUCATION: CPT

## 2022-08-18 PROCEDURE — 97140 MANUAL THERAPY 1/> REGIONS: CPT

## 2022-08-18 PROCEDURE — 97110 THERAPEUTIC EXERCISES: CPT

## 2022-08-18 NOTE — FLOWSHEET NOTE
Flexion 4/5 4-/5* *mild pain  Cue to avoid UT activation   Abduction 5/5 4/5  cue to avoid UT activation   ER 4+/5 4-/5     IR 5/5 4+/5*  *mild pain   Supraspinatus  5/5 4-/5   Cue to avoid UT activation   Upper Trap         Lower Trap         Mid Trap         Rhomboids         Biceps         Triceps         Horizontal Abduction         Horizontal Adduction         Lats             Occupation/School: ; [employer] and walking;  Living Status/Prior Level of Function: Independent with ADLs and IADLs, unlimited prior to injury in R shoulder;    RESTRICTIONS/PRECAUTIONS: Prox humeral fracture on 5/3/22 w surg on 5/6/22- Aggressive PROM; begin strengthening ~6/29/22  Exercises/Interventions:   Exercise/Equipment Resistance/Repetitions Comments   Cardio/Warm-up     Bike          Stretching/PROM     Cane ER 10x10\" at 45 degrees  10x10\" at 90 degrees       IR behind back  10x10\" Belt up the back At wall to block shoulder  from tipping forward   Sleeper Stretch 10x10\"    Cross Body Adduction 3x30\" Pull and turn at wall; cues for scapular depression/retraction first   OH Cane Flexion 10x10\" 1# ankle weight on cane      Wall slides 10x10\" Flexion, scaption       Pec Stretch Doorway 3x30\"          Wrist flex/ext           STRENGTH     Isometrics     Retraction          Weight shift     HEP   HEP         Biceps     Triceps          PRE's        Wall slide eccentrics x10ea: flexion, scaption    Flexion 3x10 1# Standing 0-90, at mirror for technique   Scaption 3x10; 1# Standing 0-90, at mirror for technique   External Rotation 3x10, 3#    Internal Rotation     IPush up plus     EXT        Reverse Flys        Prone T's 3x10, R on blue SB, 1# Prone row + ER     Prone ext 3x10, R, on blue SB, 1#       Horizontal Abd with ER        Triceps        Push up plus at wall 3x10         Cable Column/Theraband     External Rotation     Internal Rotation 3x10, Silver    Shrugs     Lats     HEP   Flex     HEP   BIC     HEP PNF          Neuromuscular Re-ed     Dynamic Stability                BoW x30ea up/down, L/R, Cw, Ccw Purple ball in 90 deg flex                            Manual/Modalities      PROM 5' , IR, ,    [facility] Joint Mobs 5' Posterior, lateral and long-axis traction; Grade III/IV          Therapeutic Exercise and NMR EXR  [x] (44252) Provided verbal/tactile cueing for activities related to strengthening, flexibility, endurance, ROM  for improvements in scapular, scapulothoracic and UE control with self care, reaching, carrying, lifting, house/yardwork, driving/computer work.    [] (84649) Provided verbal/tactile cueing for activities related to improving balance, coordination, kinesthetic sense, posture, motor skill, proprioception  to assist with  scapular, scapulothoracic and UE control with self care, reaching, carrying, lifting, house/yardwork, driving/computer work. Therapeutic Activities:    [x] (81171 or 59744) Provided verbal/tactile cueing for activities related to improving balance, coordination, kinesthetic sense, posture, motor skill, proprioception and motor activation to allow for proper function of scapular, scapulothoracic and UE control with self care, carrying, lifting, driving/computer work. Home Exercise Program:    [x] (60917) Reviewed/Progressed HEP activities related to strengthening, flexibility, endurance, ROM of scapular, scapulothoracic and UE control with self care, reaching, carrying, lifting, house/yardwork, driving/computer work     Written HEP est    Access Code: [email or web address]  URL: [email or web address]  Date: 08/15/2022  Prepared by:  Sundar Solorio    Exercises  Doorway Pec Stretch at 90 Degrees Abduction - 3 x daily - 7 x weekly - 3 sets - 30\" hold  Doorway Rhomboid Stretch - 3 x daily - 7 x weekly - 10 reps - 10 hold  Sleeper Stretch - 3 x daily - 7 x weekly - 3 sets - 30\" hold  Standing Shoulder Internal Rotation AAROM Behind Back with Towel - 3 x daily - 7 x weekly - 1 sets - 10 reps - 10 hold  Supine Shoulder External Rotation in 45 Degrees Abduction AAROM with Dowel - 3 x daily - 7 x weekly - 10 reps - 10 hold  Supine Shoulder External Rotation with Dowel - 3 x daily - 7 x weekly - 10 reps - 10 hold  Shoulder Flexion Wall Slide with Towel - 2 x daily - 7 x weekly - 10 reps - 10 hold  Scaption Wall Slide with Towel - 2 x daily - 7 x weekly - 10 sets - 10\" hold  Supine Shoulder Flexion Extension AAROM with Dowel - 2 x daily - 7 x weekly - 10 reps - 10 hold  Seated Bicep Curls Supinated with Dumbbells - 1 x daily - 4 x weekly - 3 sets - 10 reps  Standing Elbow Extension with Anchored Resistance - 1 x daily - 4 x weekly - 3 sets - 10 reps  Standing Bent Over Single Arm Scapular Row with Table Support - 1 x daily - 4 x weekly - 3 sets - 10 reps  Supine Scapular Protraction in Flexion with Dumbbells - 1 x daily - 4 x weekly - 3 sets - 10 reps  Standing Shoulder Internal Rotation with Anchored Resistance - 1 x daily - 4 x weekly - 3 sets - 10 reps  Sidelying Shoulder External Rotation - 1 x daily - 4 x weekly - 3 sets - 10 reps  Seated Shoulder Flexion - 1 x daily - 4 x weekly - 3 sets - 10 reps  Seated Shoulder Empty Can AROM - 1 x daily - 4 x weekly - 3 sets - 10 reps  Supine Chest Stretch with Elbows Bent - 3 x daily - 7 x weekly - 3 sets - 30 hold  Standing Wall [employer] with Mini Swiss Ball - 1 x daily - 4 x weekly - 3 sets - 10 reps        [x] (69638) Reviewed/Progressed HEP activities related to improving balance, coordination, kinesthetic sense, posture, motor skill, proprioception of scapular, scapulothoracic and UE control with self care, reaching, carrying, lifting, house/yardwork, driving/computer work      Manual Treatments:  PROM / STM / Oscillations-Mobs:  G-I, II, III, IV (PA's, Inf., Post.)  [x] (67980) Provided manual therapy to mobilize soft tissue/joints of cervical/CT, scapular GHJ and UE for the purpose of modulating pain, promoting relaxation,  increasing ROM, reducing/eliminating soft tissue swelling/inflammation/restriction, improving soft tissue extensibility and allowing for proper ROM for normal function with self care, reaching, carrying, lifting, house/yardwork, driving/computer work      Modalities:  CP 15'     [] GAME READY (VASO)- for significant edema, swelling, pain control. Charges:  Timed Code Treatment Minutes: 61'   Total Treatment Minutes: 75'   930-1052    [] EVAL (LOW) 455 1011 (typically 20 minutes face-to-face)  [] EVAL (MOD) 35356 (typically 30 minutes face-to-face)  [] EVAL (HIGH) 48685 (typically 45 minutes face-to-face)  [] RE-EVAL   [x] MD(23582) x 1  [] IONTO  [x] NMR (88462) x  1  [] VASO  [x] Manual (03206) x 1    [] Other:  [x] TA (32651) x 1   [] Mech Traction (99997)  [] ES(attended) (42756)      [] ES (un) (17073):     GOALS:  Patient stated goal: regain full strength and function in R shoulder  [x] Progressing: [] Met: [] Not Met: [] Adjusted    Therapist goals for Patient:   Short Term Goals: To be achieved in: 2 weeks  1. Independent in HEP and progression per patient tolerance, in order to prevent re-injury. [] Progressing: [x] Met: [] Not Met: [] Adjusted  2. Patient will have a decrease in pain to facilitate improvement in movement, function, and ADLs as indicated by Functional Deficits. [] Progressing: [x] Met: [] Not Met: [] Adjusted    Long Term Goals: To be achieved in: 12 weeks  1. Disability index score of 67 or more for the FOTO to assist with reaching prior level of function. [] Progressing: [x] Met: [] Not Met: [] Adjusted  2. Patient will demonstrate increased AROM to 170+ elevation, 90 ER, and reach mid back to allow for proper joint functioning as indicated by patients Functional Deficits. [x] Progressing: [] Met: [] Not Met: [] Adjusted  3. Patient will demonstrate an increase in Strength to 5/5 to allow for proper functional mobility as indicated by patients Functional Deficits.    [x] Progressing: [] Met: []

## 2022-08-22 ENCOUNTER — HOSPITAL ENCOUNTER (OUTPATIENT)
Dept: PHYSICAL THERAPY | Age: 57
Setting detail: THERAPIES SERIES
Discharge: HOME | End: 2022-08-22
Payer: COMMERCIAL

## 2022-08-22 PROCEDURE — 97112 NEUROMUSCULAR REEDUCATION: CPT

## 2022-08-22 PROCEDURE — 97110 THERAPEUTIC EXERCISES: CPT

## 2022-08-22 PROCEDURE — 97530 THERAPEUTIC ACTIVITIES: CPT

## 2022-08-22 NOTE — PLAN OF CARE
[address]      Physical Therapy Re-Certification Plan of Care    Dear  Dr. Cj Justice,    We had the pleasure of treating the following patient for physical therapy services at [address] [place] [address]. A summary of our findings can be found in the updated assessment below. This includes our plan of care. If you have any questions or concerns regarding these findings, please do not hesitate to contact me at [phone]. Thank you for the referral.     Physician Signature:________________________________Date:__________________  By signing above (or electronic signature), therapists plan is approved by physician      Overall Response to Treatment:   []Patient is responding well to treatment and improvement is noted with regards  to goals   []Patient should continue to improve in reasonable time if they continue HEP   []Patient has plateaued and is no longer responding to skilled PT intervention    []Patient is getting worse and would benefit from return to referring MD   []Patient unable to adhere to initial POC   [x]Other: Patient has begun to plateau in therapy in regards to his ROM and strength. Despite plateau, he makes weekly improvements in functional use of arm with ADL's and reports improvement in overall strength of shoulder. He has functional passive motion into flexion and ER but limited still into abduction and severely into IR. Actively he can get arm overhead but requires a shrug to do so showing likely on-going RC weakness present. He has a negative drop arm and negative ERLS making it unlikely he has a full thickness tear. MMT show 4+/5 strength in all planes but 4- into scaption/supraspinatus testing. Patient to see MD Wednesday for re-assessment.  Plan to continue PT 2x/week for 4 more weeks to work on shoulder PROM, AROM, strength and endurance to restore full function to right shoulder and return patient safely to his PLOF.    Date range of Visits: 22-22      Total Visits: 22      Physical Therapy Daily Treatment Note      Date:  2022    Patient Name:  Gibson Mancini    :  1965  MRN: [id number]  Restrictions/Precautions: prox humeral fx 22  Medical/Treatment Diagnosis Information:  Diagnosis: V91.874C (ICD-10-CM) - Closed fracture of proximal end of right humerus, unspecified fracture morphology, initial encounter; Z98.890, Z87.81 (ICD-10-CM) - S/P ORIF (open reduction internal fixation) fracture      DOS: 22       OPEN REDUCTION INTERNAL FIXATION PROXIMAL HUMERUS, RIGHT   Treating Diagnosis: dec ROM in R UE, weakness in R UE, pain in R shoulder     Insurance/Certification information:  PT Insurance Information: R  Physician Information:   Dr. Michael Gardner  Has the plan of care been signed (Y/N):        [x]  Yes  []  No     Date of Patient follow up with Physician: 22      Is this a Progress Report:     []  Yes  [x]  No        If Yes:  Date Range for reporting period:  Beginnin22  Endin22    Progress report will be due (10 Rx or 30 days whichever is less):       Recertification will be due (POC Duration  / 90 days whichever is less): 22        Visit # Insurance Allowable Auth Required    60 []  Yes [x]  No        Functional Scale:    OUTCOME MEASURE DATE SCORE   FOTO Shoulder 22 37   FOTO Shoulder 22 55   FOTO Shoulder 22 64   FOTO Shoulder 22 65   FOTO Shoulder 22 70   FOTO Shoulder 22 70           Latex Allergy:  [x]NO      []YES  Preferred Language for Healthcare:   [x]English       []other:     Pain level:  0-1/10     SUBJECTIVE:  3.5 months PO. Reports he is doing well today. Strength and motion getting better per patient report. Soreness from last visit but nothing out of the ordinary.       OBJECTIVE:     22  ROM PROM AROM  Comment     L R      L R    Flexion   164° with OH cane 150° 130° with shrug     Abduction  125° 165° 100° with shrug     ER @ 90 87 85°with cane T4 C7     IR (GH) @ 90 45 15° T6 L5        8/22/22  Strength L R Comment   Flexion 5 4+    Abduction 5 4    ER 5 4+     IR 5 5    Supraspinatus 5 4    Champagne Toast   4-     Lower Trap         Mid Trap         Rhomboids         Biceps         Triceps         Horizontal Abduction         Horizontal Adduction         Lats             Occupation/School: ; [email or web address];  Living Status/Prior Level of Function: Independent with ADLs and IADLs, unlimited prior to injury in R shoulder;    RESTRICTIONS/PRECAUTIONS: Prox humeral fracture on 5/3/22 w surg on 5/6/22- Aggressive PROM; begin strengthening ~6/29/22  Exercises/Interventions:   Exercise/Equipment Resistance/Repetitions Comments   Cardio/Warm-up     Bike          Stretching/PROM     Cane ER 10x10\" at 45 degrees  10x10\" at 90 degrees       IR behind back  10x10\" Belt up the back At wall to block shoulder  from tipping forward   Sleeper Stretch 10x10\"    Cross Body Adduction 3x30\" Pull and turn at wall; cues for scapular depression/retraction first   OH Cane Flexion 10x10\" 1# ankle weight on cane      Wall slides 10x10\" Flexion, scaption       Pec Stretch Doorway 3x30\"          Wrist flex/ext           STRENGTH     Isometrics     Retraction          Weight shift     HEP   HEP         Biceps     Triceps          PRE's        Wall slide eccentrics 2x10ea: flexion, abudction    External Rotation 3x10, 3#    Internal Rotation     IPush up plus     EXT        Reverse Flys        Prone row + ER           Horizontal Abd with ER        Triceps        Push up plus at wall 3x10         Cable Column/Theraband     External Rotation     Internal Rotation 3x10, Silver    Shrugs     Lats     HEP   Flex     HEP   BIC     HEP   PNF          Neuromuscular Re-ed     Dynamic Stability                BoW x30ea up/down, L/R, Cw, Ccw Purple ball in 90 deg flex  And 90 deg abduction Manual/Modalities             Therapeutic Exercise and NMR EXR  [x] (51152) Provided verbal/tactile cueing for activities related to strengthening, flexibility, endurance, ROM  for improvements in scapular, scapulothoracic and UE control with self care, reaching, carrying, lifting, house/yardwork, driving/computer work.    [] (85128) Provided verbal/tactile cueing for activities related to improving balance, coordination, kinesthetic sense, posture, motor skill, proprioception  to assist with  scapular, scapulothoracic and UE control with self care, reaching, carrying, lifting, house/yardwork, driving/computer work. Therapeutic Activities:    [x] (83420 or 11191) Provided verbal/tactile cueing for activities related to improving balance, coordination, kinesthetic sense, posture, motor skill, proprioception and motor activation to allow for proper function of scapular, scapulothoracic and UE control with self care, carrying, lifting, driving/computer work. Home Exercise Program:    [x] (19955) Reviewed/Progressed HEP activities related to strengthening, flexibility, endurance, ROM of scapular, scapulothoracic and UE control with self care, reaching, carrying, lifting, house/yardwork, driving/computer work     Written HEP est    Access Code: [email or web address]  URL: [email or web address]  Date: 08/15/2022  Prepared by:  Sundar Solorio    Exercises  Doorway Pec Stretch at 90 Degrees Abduction - 3 x daily - 7 x weekly - 3 sets - 30\" hold  Doorway Rhomboid Stretch - 3 x daily - 7 x weekly - 10 reps - 10 hold  Sleeper Stretch - 3 x daily - 7 x weekly - 3 sets - 30\" hold  Standing Shoulder Internal Rotation AAROM Behind Back with Towel - 3 x daily - 7 x weekly - 1 sets - 10 reps - 10 hold  Supine Shoulder External Rotation in 45 Degrees Abduction AAROM with Dowel - 3 x daily - 7 x weekly - 10 reps - 10 hold  Supine Shoulder External Rotation with Dowel - 3 x daily - 7 x weekly - 10 reps - 10 hold  Shoulder Flexion Wall Slide with Towel - 2 x daily - 7 x weekly - 10 reps - 10 hold  Scaption Wall Slide with Towel - 2 x daily - 7 x weekly - 10 sets - 10\" hold  Supine Shoulder Flexion Extension AAROM with Dowel - 2 x daily - 7 x weekly - 10 reps - 10 hold  Seated Bicep Curls Supinated with Dumbbells - 1 x daily - 4 x weekly - 3 sets - 10 reps  Standing Elbow Extension with Anchored Resistance - 1 x daily - 4 x weekly - 3 sets - 10 reps  Standing Bent Over Single Arm Scapular Row with Table Support - 1 x daily - 4 x weekly - 3 sets - 10 reps  Supine Scapular Protraction in Flexion with Dumbbells - 1 x daily - 4 x weekly - 3 sets - 10 reps  Standing Shoulder Internal Rotation with Anchored Resistance - 1 x daily - 4 x weekly - 3 sets - 10 reps  Sidelying Shoulder External Rotation - 1 x daily - 4 x weekly - 3 sets - 10 reps  Seated Shoulder Flexion - 1 x daily - 4 x weekly - 3 sets - 10 reps  Seated Shoulder Empty Can AROM - 1 x daily - 4 x weekly - 3 sets - 10 reps  Supine Chest Stretch with Elbows Bent - 3 x daily - 7 x weekly - 3 sets - 30 hold  Standing Wall [employer] with Mini Swiss Ball - 1 x daily - 4 x weekly - 3 sets - 10 reps        [x] (36330) Reviewed/Progressed HEP activities related to improving balance, coordination, kinesthetic sense, posture, motor skill, proprioception of scapular, scapulothoracic and UE control with self care, reaching, carrying, lifting, house/yardwork, driving/computer work      Manual Treatments:  PROM / STM / Oscillations-Mobs:  G-I, II, III, IV (PA's, Inf., Post.)  [x] (30171) Provided manual therapy to mobilize soft tissue/joints of cervical/CT, scapular GHJ and UE for the purpose of modulating pain, promoting relaxation,  increasing ROM, reducing/eliminating soft tissue swelling/inflammation/restriction, improving soft tissue extensibility and allowing for proper ROM for normal function with self care, reaching, carrying, lifting, house/yardwork, driving/computer work      Modalities:  CP functional goal)    [x] Progressing: [] Met: [] Not Met: [] Adjusted    Overall Progression Towards Functional goals/ Treatment Progress Update:  [x] Patient is progressing as expected towards functional goals listed. [x] Progression is slowed due to complexities/Impairments listed. - joint stiffness, possible RC dysfunction  [] Progression has been slowed due to co-morbidities. [] Plan just implemented, too soon to assess goals progression <30days   [] Goals require adjustment due to lack of progress  [] Patient is not progressing as expected and requires additional follow up with physician  [] Other    ASSESSMENT:      Treatment/Activity Tolerance:  [x] Patient tolerated treatment well [] Patient limited by fatique  [] Patient limited by pain  [] Patient limited by other medical complications  [x] Other: See above. Prognosis: [x] Good [] Fair  [] Poor    Patient Requires Follow-up: [x] Yes  [] No    PLAN: 2x/week for 4 weeks 8/22/22  [x] Continue per plan of care [] Alter current plan (see comments)  [] Plan of care initiated [] Hold pending MD visit [] Discharge      Electronically signed by: Winston Jane, PT, DPT, OCS    Note: If patient does not return for scheduled/ recommended follow up visits, this note will serve as a discharge from care along with most recent update on progress.

## 2022-08-24 ENCOUNTER — OFFICE VISIT (OUTPATIENT)
Dept: ORTHOPEDIC SURGERY | Age: 57
End: 2022-08-24
Payer: COMMERCIAL

## 2022-08-24 VITALS — HEIGHT: 77 IN | WEIGHT: 248 LBS | BODY MASS INDEX: 29.28 KG/M2

## 2022-08-24 DIAGNOSIS — Z87.81 S/P ORIF (OPEN REDUCTION INTERNAL FIXATION) FRACTURE: Primary | ICD-10-CM

## 2022-08-24 DIAGNOSIS — Z98.890 S/P ORIF (OPEN REDUCTION INTERNAL FIXATION) FRACTURE: Primary | ICD-10-CM

## 2022-08-24 PROCEDURE — 99213 OFFICE O/P EST LOW 20 MIN: CPT | Performed by: ORTHOPAEDIC SURGERY

## 2022-08-24 NOTE — PROGRESS NOTES
Merna   [id number]  August 24, 2022    Chief Complaint   Patient presents with    Follow-up     ORIF Rt Humerus  DOS 5/6/22       History: The patient is here for follow-up regarding his right shoulder. He is now 3-1/2 months status post open reduction and internal fixation of the right proximal humerus. He is having mild pain. He denies any numbness or tingling. He is participating in physical therapy. The patient's  past medical history, medications, allergies,  family history, social history, and have been reviewed, and dated and are recorded in the chart. Pertinent items are noted in HPI. Review of systems reviewed from Pertinent History Form dated on 5/13/22 and available in the patient's chart under the Media tab. Vitals:  Ht 6' 5\" (1.956 m)   Wt 248 lb (112.5 kg)   BMI 29.41 kg/m²     Physical: On examination today, the patient is alert and oriented x 3. The incision is clean and dry. There is no sign of infection. He is neurovascularly intact distally. He extends his right elbow to approximately 0 degrees short of full extension. He flexes the right elbow to 150 degrees. He flexes and extends the right wrist without difficulty. We abducted the right shoulder to approximately 165 degrees without much pain. He forward flexes the right shoulder to 165 degrees. We internally rotated the right shoulder to approximately L5. He externally rotates to approximately 50 degrees. External rotation of the left shoulder is approximately 80 degrees. X-rays: 3 views of the right shoulder obtained in the office today were extensively reviewed. The hardware is in good position. The fracture is well aligned. There has been no further displacement. There has been a significant amount of callus formation. The fracture is completely healed. Impression: Status post open reduction and internal fixation of right proximal humerus fracture    Plan:  At this time, we will continue our

## 2022-08-26 ENCOUNTER — HOSPITAL ENCOUNTER (OUTPATIENT)
Dept: PHYSICAL THERAPY | Age: 57
Setting detail: THERAPIES SERIES
Discharge: HOME | End: 2022-08-26
Payer: COMMERCIAL

## 2022-08-26 PROCEDURE — 97530 THERAPEUTIC ACTIVITIES: CPT

## 2022-08-26 PROCEDURE — 97110 THERAPEUTIC EXERCISES: CPT

## 2022-08-26 PROCEDURE — 97140 MANUAL THERAPY 1/> REGIONS: CPT

## 2022-08-26 PROCEDURE — 97112 NEUROMUSCULAR REEDUCATION: CPT

## 2022-08-26 NOTE — FLOWSHEET NOTE
[address] [facility]       Physical Therapy Daily Treatment Note      Date:  2022    Patient Name:  Alex Funez    :  1965  MRN: [id number]  Restrictions/Precautions: prox humeral fx 22  Medical/Treatment Diagnosis Information:  Diagnosis: M55.264Z (ICD-10-CM) - Closed fracture of proximal end of right humerus, unspecified fracture morphology, initial encounter; Z98.890, Z87.81 (ICD-10-CM) - S/P ORIF (open reduction internal fixation) fracture      DOS: 22       OPEN REDUCTION INTERNAL FIXATION PROXIMAL HUMERUS, RIGHT   Treating Diagnosis: dec ROM in R UE, weakness in R UE, pain in R shoulder     Insurance/Certification information:  PT Insurance Information: [facility]  Physician Information:   Dr. Trenton Bradford  Has the plan of care been signed (Y/N):        [x]  Yes  []  No     Date of Patient follow up with Physician: 10/25/22      Is this a Progress Report:     []  Yes  [x]  No        If Yes:  Date Range for reporting period:  Beginnin22  Ending:     Progress report will be due (10 Rx or 30 days whichever is less): 3/48/86      Recertification will be due (POC Duration  / 90 days whichever is less): 22        Visit # Insurance Allowable Auth Required   23 60 []  Yes [x]  No        Functional Scale:    OUTCOME MEASURE DATE SCORE   FOTO Shoulder 22 37   FOTO Shoulder 22 55   FOTO Shoulder 22 64   FOTO Shoulder 22 65   FOTO Shoulder 22 70   FOTO Shoulder 22 70           Latex Allergy:  [x]NO      []YES  Preferred Language for Healthcare:   [x]English       []other:     Pain level:  0-1/10     SUBJECTIVE:  3.5 months PO. Reports no new issues. Visit with Dr. Trenton Bradford went well on Wednesday -  pleased with progress and bone healing very well. Told him it would take a long time for full function due to trauma.       OBJECTIVE:     22  ROM PROM AROM  Comment     L R      L R    Flexion   164° with OH cane 150° 130° with shrug     Abduction  125° 165° 100° with shrug     ER @ 90 87 85°with cane T4 C7     IR (GH) @ 90 45 15° T6 L5        8/22/22  Strength L R Comment   Flexion 5 4+    Abduction 5 4    ER 5 4+     IR 5 5    Supraspinatus 5 4    Champagne Toast   4-     Lower Trap         Mid Trap         Rhomboids         Biceps         Triceps         Horizontal Abduction         Horizontal Adduction         Lats             Occupation/School: ; [employer];  Living Status/Prior Level of Function: Independent with ADLs and IADLs, unlimited prior to injury in R shoulder;    RESTRICTIONS/PRECAUTIONS: Prox humeral fracture on 5/3/22 w surg on 5/6/22- Aggressive PROM; begin strengthening ~6/29/22  Exercises/Interventions:   Exercise/Equipment Resistance/Repetitions Comments   Cardio/Warm-up     Bike          Stretching/PROM     HEP      IR behind back  3x30\" Belt up the back At wall to block shoulder  from tipping forward   Sleeper Stretch 3x30\"    Cross Body Adduction 3x30\" Pull and turn at wall; cues for scapular depression/retraction first   OH Cane Flexion 3x30\" 1# ankle weight on cane      Wall slides 10x10\" Flexion, scaption       Pec Stretch Doorway 3x30\"          Wrist flex/ext           STRENGTH     Isometrics     Retraction          Weight shift     HEP   HEP         Biceps     Triceps          PRE's        Wall slide eccentrics 2x10ea: flexion, abudction    External Rotation 3x10, 3#    Internal Rotation     IPush up plus     EXT        Reverse Flys        Prone row + ER           Horizontal Abd with ER        Triceps        Push up plus at wall 3x10         Cable Column/Theraband     External Rotation 10x10\";  Blue    Internal Rotation 3x10, Silver    Shrugs     Lats     HEP   Flex     HEP   BIC     HEP   PNF          Neuromuscular Re-ed     Dynamic Stability     Body blade IR/ER at  side; 3x20\"            BoW x30ea up/down, L/R, Cw, Ccw Purple ball in 90 deg flex  And 90 deg abduction Manual/Modalities      PROM 5' IR  [address] Joint Mobs 5' Posterior, lateral and long-axis traction; Grade III/IV          Therapeutic Exercise and NMR EXR  [x] (14473) Provided verbal/tactile cueing for activities related to strengthening, flexibility, endurance, ROM  for improvements in scapular, scapulothoracic and UE control with self care, reaching, carrying, lifting, house/yardwork, driving/computer work.    [] (45360) Provided verbal/tactile cueing for activities related to improving balance, coordination, kinesthetic sense, posture, motor skill, proprioception  to assist with  scapular, scapulothoracic and UE control with self care, reaching, carrying, lifting, house/yardwork, driving/computer work. Therapeutic Activities:    [x] (53993 or 93886) Provided verbal/tactile cueing for activities related to improving balance, coordination, kinesthetic sense, posture, motor skill, proprioception and motor activation to allow for proper function of scapular, scapulothoracic and UE control with self care, carrying, lifting, driving/computer work. Home Exercise Program:    [x] (93986) Reviewed/Progressed HEP activities related to strengthening, flexibility, endurance, ROM of scapular, scapulothoracic and UE control with self care, reaching, carrying, lifting, house/yardwork, driving/computer work     Written HEP est    Access Code: [email or web address]  URL: [email or web address]  Date: 08/15/2022  Prepared by:  Sundar Solorio    Exercises  Doorway Pec Stretch at 90 Degrees Abduction - 3 x daily - 7 x weekly - 3 sets - 30\" hold  Doorway Rhomboid Stretch - 3 x daily - 7 x weekly - 10 reps - 10 hold  Sleeper Stretch - 3 x daily - 7 x weekly - 3 sets - 30\" hold  Standing Shoulder Internal Rotation AAROM Behind Back with Towel - 3 x daily - 7 x weekly - 1 sets - 10 reps - 10 hold  Supine Shoulder External Rotation in 45 Degrees Abduction AAROM with Dowel - 3 x daily - 7 x weekly - 10 reps - 10 hold  Supine Shoulder External Rotation with Dowel - 3 x daily - 7 x weekly - 10 reps - 10 hold  Shoulder Flexion Wall Slide with Towel - 2 x daily - 7 x weekly - 10 reps - 10 hold  Scaption Wall Slide with Towel - 2 x daily - 7 x weekly - 10 sets - 10\" hold  Supine Shoulder Flexion Extension AAROM with Dowel - 2 x daily - 7 x weekly - 10 reps - 10 hold  Seated Bicep Curls Supinated with Dumbbells - 1 x daily - 4 x weekly - 3 sets - 10 reps  Standing Elbow Extension with Anchored Resistance - 1 x daily - 4 x weekly - 3 sets - 10 reps  Standing Bent Over Single Arm Scapular Row with Table Support - 1 x daily - 4 x weekly - 3 sets - 10 reps  Supine Scapular Protraction in Flexion with Dumbbells - 1 x daily - 4 x weekly - 3 sets - 10 reps  Standing Shoulder Internal Rotation with Anchored Resistance - 1 x daily - 4 x weekly - 3 sets - 10 reps  Sidelying Shoulder External Rotation - 1 x daily - 4 x weekly - 3 sets - 10 reps  Seated Shoulder Flexion - 1 x daily - 4 x weekly - 3 sets - 10 reps  Seated Shoulder Empty Can AROM - 1 x daily - 4 x weekly - 3 sets - 10 reps  Supine Chest Stretch with Elbows Bent - 3 x daily - 7 x weekly - 3 sets - 30 hold  Standing Wall [employer] with Mini Swiss Ball - 1 x daily - 4 x weekly - 3 sets - 10 reps        [x] (00765) Reviewed/Progressed HEP activities related to improving balance, coordination, kinesthetic sense, posture, motor skill, proprioception of scapular, scapulothoracic and UE control with self care, reaching, carrying, lifting, house/yardwork, driving/computer work      Manual Treatments:  PROM / STM / Oscillations-Mobs:  G-I, II, III, IV (PA's, Inf., Post.)  [x] (21833) Provided manual therapy to mobilize soft tissue/joints of cervical/CT, scapular GHJ and UE for the purpose of modulating pain, promoting relaxation,  increasing ROM, reducing/eliminating soft tissue swelling/inflammation/restriction, improving soft tissue extensibility and allowing for proper ROM for normal function with self care, reaching, carrying, lifting, house/yardwork, driving/computer work      Modalities:  CP 15'     [] GAME READY (VASO)- for significant edema, swelling, pain control. Charges:  Timed Code Treatment Minutes: 61'   Total Treatment Minutes: 75'   845-1000    [] EVAL (LOW) 86926 (typically 20 minutes face-to-face)  [] EVAL (MOD) 35201 (typically 30 minutes face-to-face)  [] EVAL (HIGH) 55581 (typically 45 minutes face-to-face)  [] RE-EVAL   [x] JM(63114) x 1  [] IONTO  [x] NMR (40524) x  1  [] VASO  [x] Manual (89283) x 1    [] Other:  [x] TA (80862) x 1   [] Mech Traction (76028)  [] ES(attended) (94316)      [] ES (un) (60883):     GOALS:  Patient stated goal: regain full strength and function in R shoulder  [x] Progressing: [] Met: [] Not Met: [] Adjusted    Therapist goals for Patient:   Short Term Goals: To be achieved in: 2 weeks  1. Independent in HEP and progression per patient tolerance, in order to prevent re-injury. [] Progressing: [x] Met: [] Not Met: [] Adjusted  2. Patient will have a decrease in pain to facilitate improvement in movement, function, and ADLs as indicated by Functional Deficits. [] Progressing: [x] Met: [] Not Met: [] Adjusted    Long Term Goals: To be achieved in: 12 weeks  1. Disability index score of 67 or more for the FOTO to assist with reaching prior level of function. [] Progressing: [x] Met: [] Not Met: [] Adjusted  2. Patient will demonstrate increased AROM to 170+ elevation, 90 ER, and reach mid back to allow for proper joint functioning as indicated by patients Functional Deficits. [x] Progressing: [] Met: [] Not Met: [] Adjusted  3. Patient will demonstrate an increase in Strength to 5/5 to allow for proper functional mobility as indicated by patients Functional Deficits. [x] Progressing: [] Met: [] Not Met: [] Adjusted  4. Patient will return to 90% functional activities without increased symptoms or restriction.    [x] Progressing: [] Met: [] Not Met: [] Adjusted  5. Pt will be able to return to playing siddhartha ball w/o restrictions.(patient specific functional goal)    [x] Progressing: [] Met: [] Not Met: [] Adjusted    Overall Progression Towards Functional goals/ Treatment Progress Update:  [x] Patient is progressing as expected towards functional goals listed. [x] Progression is slowed due to complexities/Impairments listed. - joint stiffness, possible RC dysfunction  [] Progression has been slowed due to co-morbidities. [] Plan just implemented, too soon to assess goals progression <30days   [] Goals require adjustment due to lack of progress  [] Patient is not progressing as expected and requires additional follow up with physician  [] Other    ASSESSMENT:      Treatment/Activity Tolerance:  [x] Patient tolerated treatment well [] Patient limited by fatique  [] Patient limited by pain  [] Patient limited by other medical complications  [x] Other: good tolerance to visit today. Added IASTM to posterior capsule / posterior cuff to assist in IR motion. Patient still very tight and stiff into passive IR but will continue to monitor at future visits. Cues needed to perform eccentric lowering on wall slides through full range versus stopping at shoulder height. No pain reported in visit but moderate fatigue by end of visit. Continue PT to restore full PROM into shoulder IR and full pain-free AROM to right shoulder as well as full RC strength in order to return patient safely to PLOF.       Prognosis: [x] Good [] Fair  [] Poor    Patient Requires Follow-up: [x] Yes  [] No    PLAN: 2x/week for 4 weeks 8/22/22  [x] Continue per plan of care [] Alter current plan (see comments)  [] Plan of care initiated [] Hold pending MD visit [] Discharge      Electronically signed by: Brigitte Peñaloza, PT, DPT, OCS    Note: If patient does not return for scheduled/ recommended follow up visits, this note will serve as a discharge from care along with most recent update on progress.

## 2022-08-29 ENCOUNTER — HOSPITAL ENCOUNTER (OUTPATIENT)
Dept: PHYSICAL THERAPY | Age: 57
Setting detail: THERAPIES SERIES
Discharge: HOME | End: 2022-08-29
Payer: COMMERCIAL

## 2022-08-29 PROCEDURE — 97530 THERAPEUTIC ACTIVITIES: CPT

## 2022-08-29 PROCEDURE — 97140 MANUAL THERAPY 1/> REGIONS: CPT

## 2022-08-29 PROCEDURE — 97112 NEUROMUSCULAR REEDUCATION: CPT

## 2022-08-29 PROCEDURE — 97110 THERAPEUTIC EXERCISES: CPT

## 2022-08-29 NOTE — FLOWSHEET NOTE
[address] [facility], [place]       Physical Therapy Daily Treatment Note      Date:  2022    Patient Name:  Virginie Mancilla    :  1965  MRN: [id number]  Restrictions/Precautions: prox humeral fx 22  Medical/Treatment Diagnosis Information:  Diagnosis: W67.393C (ICD-10-CM) - Closed fracture of proximal end of right humerus, unspecified fracture morphology, initial encounter; Z98.890, Z87.81 (ICD-10-CM) - S/P ORIF (open reduction internal fixation) fracture      DOS: 22       OPEN REDUCTION INTERNAL FIXATION PROXIMAL HUMERUS, RIGHT   Treating Diagnosis: dec ROM in R UE, weakness in R UE, pain in R shoulder     Insurance/Certification information:  PT Insurance Information: [facility]  Physician Information:   Dr. Luis Miguel Lau  Has the plan of care been signed (Y/N):        [x]  Yes  []  No     Date of Patient follow up with Physician: 10/25/22      Is this a Progress Report:     []  Yes  [x]  No        If Yes:  Date Range for reporting period:  Beginnin22  Ending:     Progress report will be due (10 Rx or 30 days whichever is less): 7/72/15      Recertification will be due (POC Duration  / 90 days whichever is less): 22        Visit # Insurance Allowable Auth Required   24 60 []  Yes [x]  No        Functional Scale:    OUTCOME MEASURE DATE SCORE   FOTO Shoulder 22 37   FOTO Shoulder 22 55   FOTO Shoulder 22 64   FOTO Shoulder 22 65   FOTO Shoulder 22 70   FOTO Shoulder 22 70           Latex Allergy:  [x]NO      []YES  Preferred Language for Healthcare:   [x]English       []other:     Pain level:  0-1/10     SUBJECTIVE:  3.5 months PO. Reports increased soreness from Friday's session after \"working it\" hard in PT. No new concerns.       OBJECTIVE:     22  ROM PROM AROM  Comment     L R      L R    Flexion   164° with OH cane 150° 130° with shrug     Abduction  125° 165° 100° with shrug     ER @ 90 87 85°with cane T4 C7 IR ([facility]) @ 90 45 15° T6 L5        8/22/22  Strength L R Comment   Flexion 5 4+    Abduction 5 4    ER 5 4+     IR 5 5    Supraspinatus 5 4    Champagne Toast   4-     Lower Trap         Mid Trap         Rhomboids         Biceps         Triceps         Horizontal Abduction         Horizontal Adduction         Lats             Occupation/School: ; [employer];  Living Status/Prior Level of Function: Independent with ADLs and IADLs, unlimited prior to injury in R shoulder;    RESTRICTIONS/PRECAUTIONS: Prox humeral fracture on 5/3/22 w surg on 5/6/22- Aggressive PROM; begin strengthening ~6/29/22  Exercises/Interventions:   Exercise/Equipment Resistance/Repetitions Comments   Cardio/Warm-up     Bike          Stretching/PROM     HEP      IR behind back  3x30\" Belt up the back At wall to block shoulder  from tipping forward   Sleeper Stretch 3x30\"    Cross Body Adduction 3x30\" Pull and turn at wall; cues for scapular depression/retraction first   OH Cane Flexion 3x30\" 1# ankle weight on cane      Wall slides 10x10\" Flexion, scaption       Pec Stretch Doorway 3x30\"          Wrist flex/ext           STRENGTH     Isometrics     Retraction          Weight shift     HEP   HEP         Biceps     Triceps          PRE's           UK Flexion 3'; 1# Supine, pain-free range   External Rotation 3x10, 3#    Internal Rotation     IPush up plus     EXT        Reverse Flys        Prone row + ER           Horizontal Abd with ER        Triceps        Push up plus at wall 3x10         Cable Column/Theraband     External Rotation 10x10\";  Blue    Internal Rotation 3x10, Silver    Shrugs     Lats     HEP   Flex     HEP   BIC     HEP   PNF          Neuromuscular Re-ed     Dynamic Stability     Body blade IR/ER at  side; 3x20\"            BoW x30ea up/down, L/R, Cw, Ccw Purple ball in 90 deg flex  And 90 deg abduction                            Manual/Modalities      PROM 5' IR  [address] [facility] Joint Mobs 5' Posterior, lateral and long-axis traction; Grade III/IV          Therapeutic Exercise and NMR EXR  [x] (85655) Provided verbal/tactile cueing for activities related to strengthening, flexibility, endurance, ROM  for improvements in scapular, scapulothoracic and UE control with self care, reaching, carrying, lifting, house/yardwork, driving/computer work.    [] (07459) Provided verbal/tactile cueing for activities related to improving balance, coordination, kinesthetic sense, posture, motor skill, proprioception  to assist with  scapular, scapulothoracic and UE control with self care, reaching, carrying, lifting, house/yardwork, driving/computer work. Therapeutic Activities:    [x] (93067 or 97022) Provided verbal/tactile cueing for activities related to improving balance, coordination, kinesthetic sense, posture, motor skill, proprioception and motor activation to allow for proper function of scapular, scapulothoracic and UE control with self care, carrying, lifting, driving/computer work. Home Exercise Program:    [x] (06044) Reviewed/Progressed HEP activities related to strengthening, flexibility, endurance, ROM of scapular, scapulothoracic and UE control with self care, reaching, carrying, lifting, house/yardwork, driving/computer work     Written HEP est    Access Code: [email or web address]  URL: [email or web address]  Date: 08/29/2022  Prepared by:  [employer]    Exercises  Doorway Rhomboid Stretch - 3 x daily - 7 x weekly - 10 reps - 10 hold  Sleeper Stretch - 3 x daily - 7 x weekly - 3 sets - 30\" hold  Standing Shoulder Internal Rotation AAROM Behind Back with Towel - 3 x daily - 7 x weekly - 1 sets - 10 reps - 10 hold  Doorway Pec Stretch at 90 Degrees Abduction - 1 x daily - 7 x weekly - 3 sets - 30\" hold  Supine Shoulder External Rotation with Dowel - 1 x daily - 7 x weekly - 10 reps - 10 hold  Shoulder Flexion Wall Slide with Towel - 1 x daily - 7 x weekly - 10 reps - 10 hold  Scaption Wall Slide with Towel - 1 x daily - 7 x weekly - 10 sets - 10\" hold  Supine Shoulder Flexion Extension AAROM with Dowel - 2 x daily - 7 x weekly - 10 reps - 10 hold  Standing Shoulder Internal Rotation with Anchored Resistance - 1 x daily - 4 x weekly - 3 sets - 10 reps  Shoulder External Rotation Reactive Isometrics - 1 x daily - 4 x weekly - 10 reps - 10\" hold  Sidelying Shoulder External Rotation - 1 x daily - 4 x weekly - 3 sets - 10 reps  Standing Wall Ball Circles with Mini Swiss Ball - 1 x daily - 4 x weekly - 3 sets - 10 reps  Wall Push Up with Plus - 1 x daily - 4 x weekly - 3 sets - 10 reps  Supine Shoulder Flexion Extension Full Range AROM - 1 x daily - 7 x weekly - 3 to 5 min        [x] (96995) Reviewed/Progressed HEP activities related to improving balance, coordination, kinesthetic sense, posture, motor skill, proprioception of scapular, scapulothoracic and UE control with self care, reaching, carrying, lifting, house/yardwork, driving/computer work      Manual Treatments:  PROM / STM / Oscillations-Mobs:  G-I, II, III, IV (PA's, Inf., Post.)  [x] (02343) Provided manual therapy to mobilize soft tissue/joints of cervical/CT, scapular GHJ and UE for the purpose of modulating pain, promoting relaxation,  increasing ROM, reducing/eliminating soft tissue swelling/inflammation/restriction, improving soft tissue extensibility and allowing for proper ROM for normal function with self care, reaching, carrying, lifting, house/yardwork, driving/computer work      Modalities:  CP 15'     [] GAME READY (VASO)- for significant edema, swelling, pain control.        Charges:  Timed Code Treatment Minutes: 48'   Total Treatment Minutes: 68'   830-938    [] EVAL (LOW) 30379 (typically 20 minutes face-to-face)  [] EVAL (MOD) 82419 (typically 30 minutes face-to-face)  [] EVAL (HIGH) 92052 (typically 45 minutes face-to-face)  [] RE-EVAL   [x] GC(32662) x 1  [] IONTO  [x] NMR (03346) x  1  [] VASO  [x] Manual (44124) x 1    [] Other:  [x] TA (36038) x 1   [] [facility] adjustment due to lack of progress  [] Patient is not progressing as expected and requires additional follow up with physician  [] Other    ASSESSMENT:      Treatment/Activity Tolerance:  [x] Patient tolerated treatment well [] Patient limited by fatique  [] Patient limited by pain  [] Patient limited by other medical complications  [x] Other: Tolerated therapy well today. Started patient on supine UK deltoid program due to on-going shrugging with upright flexion and scaption. He was moderately fatigued with this using 1# weight after 3 minutes. He was educated to continue to progress up to 5 minutes as tolerated and then increase to 2#. ROM still most restricted into IR but tolerates manual therapy techniques well. Continue PT to restore full PROM into shoulder IR and full pain-free AROM to right shoulder as well as full RC strength in order to return patient safely to PLOF. Prognosis: [x] Good [] Fair  [] Poor    Patient Requires Follow-up: [x] Yes  [] No    PLAN: 2x/week for 4 weeks 8/22/22  [x] Continue per plan of care [] Alter current plan (see comments)  [] Plan of care initiated [] Hold pending MD visit [] Discharge      Electronically signed by: Roselyn Willams, PT, DPT, OCS    Note: If patient does not return for scheduled/ recommended follow up visits, this note will serve as a discharge from care along with most recent update on progress.

## 2022-09-01 PROBLEM — Z12.5 SCREENING PSA (PROSTATE SPECIFIC ANTIGEN): Status: ACTIVE | Noted: 2022-09-01

## 2022-09-01 PROBLEM — I10 PRIMARY HYPERTENSION: Status: ACTIVE | Noted: 2022-09-01

## 2022-09-02 ENCOUNTER — HOSPITAL ENCOUNTER (OUTPATIENT)
Dept: PHYSICAL THERAPY | Age: 57
Setting detail: THERAPIES SERIES
Discharge: HOME | End: 2022-09-02
Payer: COMMERCIAL

## 2022-09-02 PROCEDURE — 97110 THERAPEUTIC EXERCISES: CPT

## 2022-09-02 PROCEDURE — 97140 MANUAL THERAPY 1/> REGIONS: CPT

## 2022-09-02 PROCEDURE — 97112 NEUROMUSCULAR REEDUCATION: CPT

## 2022-09-02 PROCEDURE — 97530 THERAPEUTIC ACTIVITIES: CPT

## 2022-09-02 NOTE — FLOWSHEET NOTE
[address] [facility], [place] [facility]       Physical Therapy Daily Treatment Note      Date:  2022    Patient Name:  Karlos Sales    :  1965  MRN: [id number]  Restrictions/Precautions: prox humeral fx 22  Medical/Treatment Diagnosis Information:  Diagnosis: A18.740G (ICD-10-CM) - Closed fracture of proximal end of right humerus, unspecified fracture morphology, initial encounter; Z98.890, Z87.81 (ICD-10-CM) - S/P ORIF (open reduction internal fixation) fracture      DOS: 22       OPEN REDUCTION INTERNAL FIXATION PROXIMAL HUMERUS, RIGHT   Treating Diagnosis: dec ROM in R UE, weakness in R UE, pain in R shoulder     Insurance/Certification information:  PT Insurance Information: R  Physician Information:   Dr. Masood Horvath  Has the plan of care been signed (Y/N):        [x]  Yes  []  No     Date of Patient follow up with Physician: 10/25/22      Is this a Progress Report:     []  Yes  [x]  No        If Yes:  Date Range for reporting period:  Beginnin22  Ending:     Progress report will be due (10 Rx or 30 days whichever is less):       Recertification will be due (POC Duration  / 90 days whichever is less): 22        Visit # Insurance Allowable Auth Required   25 60 []  Yes [x]  No        Functional Scale:    OUTCOME MEASURE DATE SCORE   FOTO Shoulder 22 37   FOTO Shoulder 22 55   FOTO Shoulder 22 64   FOTO Shoulder 22 65   FOTO Shoulder 22 70   FOTO Shoulder 22 70           Latex Allergy:  [x]NO      []YES  Preferred Language for Healthcare:   [x]English       []other:     Pain level:  0-1/10     SUBJECTIVE:  4 months PO. Reports soreness after last visit that took about a day to resolve.  Felt it was more muscle soreness from change in program.      OBJECTIVE:   22  ROM PROM AROM  Comment     L R      L R    Flexion   164° with OH cane 150° 130° with shrug     Abduction  125° 165° 100° with shrug     ER @ 90 87 85°with cane T4 C7     IR (GH) @ 90 45 15° T6 L5        8/22/22  Strength L R Comment   Flexion 5 4+    Abduction 5 4    ER 5 4+     IR 5 5    Supraspinatus 5 4    Champagne Toast   4-     Lower Trap         Mid Trap         Rhomboids         Biceps         Triceps         Horizontal Abduction         Horizontal Adduction         Lats             Occupation/School: ; [employer];  Living Status/Prior Level of Function: Independent with ADLs and IADLs, unlimited prior to injury in R shoulder;    RESTRICTIONS/PRECAUTIONS: Prox humeral fracture on 5/3/22 w surg on 5/6/22- Aggressive PROM; begin strengthening ~6/29/22  Exercises/Interventions:   Exercise/Equipment Resistance/Repetitions Comments   Cardio/Warm-up     Bike          Stretching/PROM     HEP      IR behind back  3x30\" Belt up the back At wall to block shoulder  from tipping forward   Sleeper Stretch 3x30\"    Cross Body Adduction 3x30\" Pull and turn at wall; cues for scapular depression/retraction first   OH Cane Flexion 3x30\" 1# ankle weight on cane      Wall slides 10x10\" Flexion, scaption       Pec Stretch Doorway 3x30\"          Wrist flex/ext           STRENGTH     Isometrics     Retraction          Weight shift     HEP   HEP         Biceps     Triceps          PRE's           UK Flexion 4'; 2# Supine, pain-free range   External Rotation 3x10, 3#    Internal Rotation     IPush up plus     EXT        Reverse Flys        Prone row + ER           Horizontal Abd with ER        Triceps        Push up plus at wall 3x10         Cable Column/Theraband     External Rotation 10x10\";  Blue    Internal Rotation 3x10, Silver    Abduction at wall 2x10x5\" holds Shoulder blade squeeze, lift arms to about 45 deg abduction and hold   Lats     HEP   Flex     HEP   BIC     HEP   PNF          Neuromuscular Re-ed     Dynamic Stability     Body blade IR/ER at  side; 3x20\"       BoW x30ea up/down, L/R, Cw, Ccw Purple ball in 90 deg flex  And 90 deg Rotation with Dowel - 1 x daily - 7 x weekly - 10 reps - 10 hold  Shoulder Flexion Wall Slide with Towel - 1 x daily - 7 x weekly - 10 reps - 10 hold  Scaption Wall Slide with Towel - 1 x daily - 7 x weekly - 10 sets - 10\" hold  Supine Shoulder Flexion Extension AAROM with Dowel - 2 x daily - 7 x weekly - 10 reps - 10 hold  Standing Shoulder Internal Rotation with Anchored Resistance - 1 x daily - 4 x weekly - 3 sets - 10 reps  Shoulder External Rotation Reactive Isometrics - 1 x daily - 4 x weekly - 10 reps - 10\" hold  Sidelying Shoulder External Rotation - 1 x daily - 4 x weekly - 3 sets - 10 reps  Standing Wall Ball Circles with Mini Swiss Ball - 1 x daily - 4 x weekly - 3 sets - 10 reps  Wall Push Up with Plus - 1 x daily - 4 x weekly - 3 sets - 10 reps  Supine Shoulder Flexion Extension Full Range AROM - 1 x daily - 7 x weekly - 3 to 5 min        [x] (25237) Reviewed/Progressed HEP activities related to improving balance, coordination, kinesthetic sense, posture, motor skill, proprioception of scapular, scapulothoracic and UE control with self care, reaching, carrying, lifting, house/yardwork, driving/computer work      Manual Treatments:  PROM / STM / Oscillations-Mobs:  G-I, II, III, IV (PA's, Inf., Post.)  [x] (57188) Provided manual therapy to mobilize soft tissue/joints of cervical/CT, scapular GHJ and UE for the purpose of modulating pain, promoting relaxation,  increasing ROM, reducing/eliminating soft tissue swelling/inflammation/restriction, improving soft tissue extensibility and allowing for proper ROM for normal function with self care, reaching, carrying, lifting, house/yardwork, driving/computer work      Modalities:  CP 15'     [] GAME READY (VASO)- for significant edema, swelling, pain control.        Charges:  Timed Code Treatment Minutes: 54'   Total Treatment Minutes: 70'   845-1000    [] EVAL (LOW) 455 1011 (typically 20 minutes face-to-face)  [] EVAL (MOD) 79765 (typically 30 minutes face-to-face)  [] EVAL (HIGH) 22532 (typically 45 minutes face-to-face)  [] RE-EVAL   [x] GI(03625) x 1  [] IONTO  [x] NMR (38219) x  1  [] VASO  [x] Manual (63012) x 1    [] Other:  [x] TA (18694) x 1   [] Mech Traction (17485)  [] ES(attended) (90984)      [] ES (un) (95918):     GOALS:  Patient stated goal: regain full strength and function in R shoulder  [x] Progressing: [] Met: [] Not Met: [] Adjusted    Therapist goals for Patient:   Short Term Goals: To be achieved in: 2 weeks  1. Independent in HEP and progression per patient tolerance, in order to prevent re-injury. [] Progressing: [x] Met: [] Not Met: [] Adjusted  2. Patient will have a decrease in pain to facilitate improvement in movement, function, and ADLs as indicated by Functional Deficits. [] Progressing: [x] Met: [] Not Met: [] Adjusted    Long Term Goals: To be achieved in: 12 weeks  1. Disability index score of 67 or more for the FOTO to assist with reaching prior level of function. [] Progressing: [x] Met: [] Not Met: [] Adjusted  2. Patient will demonstrate increased AROM to 170+ elevation, 90 ER, and reach mid back to allow for proper joint functioning as indicated by patients Functional Deficits. [x] Progressing: [] Met: [] Not Met: [] Adjusted  3. Patient will demonstrate an increase in Strength to 5/5 to allow for proper functional mobility as indicated by patients Functional Deficits. [x] Progressing: [] Met: [] Not Met: [] Adjusted  4. Patient will return to 90% functional activities without increased symptoms or restriction. [x] Progressing: [] Met: [] Not Met: [] Adjusted  5. Pt will be able to return to playing siddhartha ball w/o restrictions.(patient specific functional goal)    [x] Progressing: [] Met: [] Not Met: [] Adjusted    Overall Progression Towards Functional goals/ Treatment Progress Update:  [x] Patient is progressing as expected towards functional goals listed.     [x] Progression is slowed due to complexities/Impairments listed. - joint stiffness, possible RC dysfunction  [] Progression has been slowed due to co-morbidities. [] Plan just implemented, too soon to assess goals progression <30days   [] Goals require adjustment due to lack of progress  [] Patient is not progressing as expected and requires additional follow up with physician  [] Other    ASSESSMENT:      Treatment/Activity Tolerance:  [x] Patient tolerated treatment well [] Patient limited by fatique  [] Patient limited by pain  [] Patient limited by other medical complications  [x] Other:  Continues to be very tight and limited into IR despite heavy manual treatment. Discussed with patient about talking to MD about medication to help with inflammation as patient is unable to take general NSAIDs. Progressed UK flexion to 2# today showing improvement in deltoid strength. Continue PT to restore full PROM into shoulder IR and full pain-free AROM to right shoulder as well as full RC strength in order to return patient safely to PLOF. Prognosis: [x] Good [] Fair  [] Poor    Patient Requires Follow-up: [x] Yes  [] No    PLAN: 2x/week for 4 weeks 8/22/22  [x] Continue per plan of care [] Alter current plan (see comments)  [] Plan of care initiated [] Hold pending MD visit [] Discharge      Electronically signed by: Ashley York, PT, DPT, OCS    Note: If patient does not return for scheduled/ recommended follow up visits, this note will serve as a discharge from care along with most recent update on progress.

## 2022-09-09 ENCOUNTER — APPOINTMENT (OUTPATIENT)
Dept: PHYSICAL THERAPY | Age: 57
End: 2022-09-09
Payer: COMMERCIAL

## 2022-09-12 ENCOUNTER — HOSPITAL ENCOUNTER (OUTPATIENT)
Dept: PHYSICAL THERAPY | Age: 57
Setting detail: THERAPIES SERIES
Discharge: HOME | End: 2022-09-12
Payer: COMMERCIAL

## 2022-09-12 PROCEDURE — 97140 MANUAL THERAPY 1/> REGIONS: CPT

## 2022-09-12 PROCEDURE — 97530 THERAPEUTIC ACTIVITIES: CPT

## 2022-09-12 PROCEDURE — 97112 NEUROMUSCULAR REEDUCATION: CPT

## 2022-09-12 PROCEDURE — 97110 THERAPEUTIC EXERCISES: CPT

## 2022-09-12 NOTE — FLOWSHEET NOTE
[address] [facility], [place]       Physical Therapy Daily Treatment Note      Date:  2022    Patient Name:  Davonte Bernstein    :  1965  MRN: [id number]  Restrictions/Precautions: prox humeral fx 22  Medical/Treatment Diagnosis Information:  Diagnosis: M84.173B (ICD-10-CM) - Closed fracture of proximal end of right humerus, unspecified fracture morphology, initial encounter; Z98.890, Z87.81 (ICD-10-CM) - S/P ORIF (open reduction internal fixation) fracture      DOS: 22       OPEN REDUCTION INTERNAL FIXATION PROXIMAL HUMERUS, RIGHT   Treating Diagnosis: dec ROM in R UE, weakness in R UE, pain in R shoulder     Insurance/Certification information:  PT Insurance Information: [facility]  Physician Information:   Dr. Bony Grant  Has the plan of care been signed (Y/N):        [x]  Yes  []  No     Date of Patient follow up with Physician: 10/25/22      Is this a Progress Report:     []  Yes  [x]  No        If Yes:  Date Range for reporting period:  Beginnin22  Ending:     Progress report will be due (10 Rx or 30 days whichever is less): 13      Recertification will be due (POC Duration  / 90 days whichever is less): 22        Visit # Insurance Allowable Auth Required   26 60 []  Yes [x]  No        Functional Scale:    OUTCOME MEASURE DATE SCORE   FOTO Shoulder 22 37   FOTO Shoulder 22 55   FOTO Shoulder 22 64   FOTO Shoulder 22 65   FOTO Shoulder 22 70   FOTO Shoulder 22 70   FOTO Shoulder 22 76           Latex Allergy:  [x]NO      []YES  Preferred Language for Healthcare:   [x]English       []other:     Pain level:  0-1/10     SUBJECTIVE:  4 months PO. Reports he is doing well today. Feels motion slowly improving and no longer has any catching sensation in shoulder with quick movements.        OBJECTIVE:   22  ROM PROM AROM  Comment     L R      L R    Flexion   164° with OH cane 150° 130° with shrug     Abduction 125° 165° 100° with shrug     ER @ 90 87 85°with cane T4 C7     IR (GH) @ 90 45 15° T6 L5        8/22/22  Strength L R Comment   Flexion 5 4+    Abduction 5 4    ER 5 4+     IR 5 5    Supraspinatus 5 4    Champagne Toast   4-     Lower Trap         Mid Trap         Rhomboids         Biceps         Triceps         Horizontal Abduction         Horizontal Adduction         Lats             Occupation/School: ; [employer];  Living Status/Prior Level of Function: Independent with ADLs and IADLs, unlimited prior to injury in R shoulder;    RESTRICTIONS/PRECAUTIONS: Prox humeral fracture on 5/3/22 w surg on 5/6/22- Aggressive PROM; begin strengthening ~6/29/22  Exercises/Interventions:   Exercise/Equipment Resistance/Repetitions Comments   Cardio/Warm-up     Bike          Stretching/PROM     HEP      IR behind back  3x30\" Belt up the back At wall to block shoulder  from tipping forward   Sleeper Stretch 3x30\"    Cross Body Adduction 3x30\" supine      Wall slides 10x10\" Flexion, scaption       Pec Stretch Doorway 3x30\"          Wrist flex/ext           STRENGTH     Isometrics     Retraction          Weight shift     HEP   HEP         Biceps     Triceps          PRE's           UK Flexion 3' 0# 30 deg incline at table   Abduction 3x10; 0# Pain free AROM sidelying   External Rotation 3x10, 3#    Internal Rotation     IPush up plus     EXT        Reverse Flys        Prone T's 3x10, Bilateral, off EOB; 0# Prone row + ER           Horizontal Abd with ER        Triceps        Push up plus at wall 3x10         Cable Column/Theraband     External Rotation 10x10\"; Blue    Internal Rotation 3x10, Silver    Abduction at wall 2x10x5\" holds;  Red>Green (second set) Shoulder blade squeeze, lift arms to about 45 deg abduction and hold   Lats     HEP   Flex     HEP   BIC     HEP   PNF          Neuromuscular Re-ed     Dynamic Stability           BoW x30ea up/down, L/R, Cw, Ccw 2# ball in 90 deg flex  And 90 deg abduction                            Manual/Modalities      PROM 5' IR     [address] Joint Mobs 5' Posterior, lateral and long-axis traction; Grade III/IV     Therapeutic Exercise and NMR EXR  [x] (62176) Provided verbal/tactile cueing for activities related to strengthening, flexibility, endurance, ROM  for improvements in scapular, scapulothoracic and UE control with self care, reaching, carrying, lifting, house/yardwork, driving/computer work.    [] (88004) Provided verbal/tactile cueing for activities related to improving balance, coordination, kinesthetic sense, posture, motor skill, proprioception  to assist with  scapular, scapulothoracic and UE control with self care, reaching, carrying, lifting, house/yardwork, driving/computer work. Therapeutic Activities:    [x] (13525 or 06403) Provided verbal/tactile cueing for activities related to improving balance, coordination, kinesthetic sense, posture, motor skill, proprioception and motor activation to allow for proper function of scapular, scapulothoracic and UE control with self care, carrying, lifting, driving/computer work. Home Exercise Program:    [x] (00023) Reviewed/Progressed HEP activities related to strengthening, flexibility, endurance, ROM of scapular, scapulothoracic and UE control with self care, reaching, carrying, lifting, house/yardwork, driving/computer work     Written HEP est    Access Code: [email or web address]  URL: [email or web address]  Date: 08/29/2022  Prepared by:  [employer]    Exercises  Doorway Rhomboid Stretch - 3 x daily - 7 x weekly - 10 reps - 10 hold  Sleeper Stretch - 3 x daily - 7 x weekly - 3 sets - 30\" hold  Standing Shoulder Internal Rotation AAROM Behind Back with Towel - 3 x daily - 7 x weekly - 1 sets - 10 reps - 10 hold  Doorway Pec Stretch at 90 Degrees Abduction - 1 x daily - 7 x weekly - 3 sets - 30\" hold  Supine Shoulder External Rotation with Dowel - 1 x daily - 7 x weekly - 10 reps - 10 hold  Shoulder Flexion Wall Slide with Towel - 1 x daily - 7 x weekly - 10 reps - 10 hold  Scaption Wall Slide with Towel - 1 x daily - 7 x weekly - 10 sets - 10\" hold  Supine Shoulder Flexion Extension AAROM with Dowel - 2 x daily - 7 x weekly - 10 reps - 10 hold  Standing Shoulder Internal Rotation with Anchored Resistance - 1 x daily - 4 x weekly - 3 sets - 10 reps  Shoulder External Rotation Reactive Isometrics - 1 x daily - 4 x weekly - 10 reps - 10\" hold  Sidelying Shoulder External Rotation - 1 x daily - 4 x weekly - 3 sets - 10 reps  Standing Wall Ball Circles with Mini Swiss Ball - 1 x daily - 4 x weekly - 3 sets - 10 reps  Wall Push Up with Plus - 1 x daily - 4 x weekly - 3 sets - 10 reps  Supine Shoulder Flexion Extension Full Range AROM - 1 x daily - 7 x weekly - 3 to 5 min        [x] (92756) Reviewed/Progressed HEP activities related to improving balance, coordination, kinesthetic sense, posture, motor skill, proprioception of scapular, scapulothoracic and UE control with self care, reaching, carrying, lifting, house/yardwork, driving/computer work      Manual Treatments:  PROM / STM / Oscillations-Mobs:  G-I, II, III, IV (PA's, Inf., Post.)  [x] (61168) Provided manual therapy to mobilize soft tissue/joints of cervical/CT, scapular GHJ and UE for the purpose of modulating pain, promoting relaxation,  increasing ROM, reducing/eliminating soft tissue swelling/inflammation/restriction, improving soft tissue extensibility and allowing for proper ROM for normal function with self care, reaching, carrying, lifting, house/yardwork, driving/computer work      Modalities:  CP 15'     [] GAME READY (VASO)- for significant edema, swelling, pain control.        Charges:  Timed Code Treatment Minutes: 54'   Total Treatment Minutes: 70'   745-855    [] EVAL (LOW) 455 1011 (typically 20 minutes face-to-face)  [] EVAL (MOD) 29987 (typically 30 minutes face-to-face)  [] EVAL (HIGH) [id number] (typically 45 minutes face-to-face)  [] RE-EVAL   [x] HB(06264) x 1  [] IONTO  [x] NMR (98322) x  1  [] VASO  [x] Manual (68679) x 1    [] Other:  [x] TA (85432) x 1   [] Mech Traction (20927)  [] ES(attended) (02864)      [] ES (un) (58087):     GOALS:  Patient stated goal: regain full strength and function in R shoulder  [x] Progressing: [] Met: [] Not Met: [] Adjusted    Therapist goals for Patient:   Short Term Goals: To be achieved in: 2 weeks  1. Independent in HEP and progression per patient tolerance, in order to prevent re-injury. [] Progressing: [x] Met: [] Not Met: [] Adjusted  2. Patient will have a decrease in pain to facilitate improvement in movement, function, and ADLs as indicated by Functional Deficits. [] Progressing: [x] Met: [] Not Met: [] Adjusted    Long Term Goals: To be achieved in: 12 weeks  1. Disability index score of 67 or more for the FOTO to assist with reaching prior level of function. [] Progressing: [x] Met: [] Not Met: [] Adjusted  2. Patient will demonstrate increased AROM to 170+ elevation, 90 ER, and reach mid back to allow for proper joint functioning as indicated by patients Functional Deficits. [x] Progressing: [] Met: [] Not Met: [] Adjusted  3. Patient will demonstrate an increase in Strength to 5/5 to allow for proper functional mobility as indicated by patients Functional Deficits. [x] Progressing: [] Met: [] Not Met: [] Adjusted  4. Patient will return to 90% functional activities without increased symptoms or restriction. [x] Progressing: [] Met: [] Not Met: [] Adjusted  5. Pt will be able to return to playing siddhartha ball w/o restrictions.(patient specific functional goal)    [x] Progressing: [] Met: [] Not Met: [] Adjusted    Overall Progression Towards Functional goals/ Treatment Progress Update:  [x] Patient is progressing as expected towards functional goals listed. [x] Progression is slowed due to complexities/Impairments listed.   - joint stiffness, possible RC dysfunction  [] Progression has been slowed due to co-morbidities. [] Plan just implemented, too soon to assess goals progression <30days   [] Goals require adjustment due to lack of progress  [] Patient is not progressing as expected and requires additional follow up with physician  [] Other    ASSESSMENT:      Treatment/Activity Tolerance:  [x] Patient tolerated treatment well [] Patient limited by fatique  [] Patient limited by pain  [] Patient limited by other medical complications  [x] Other:  Continues to be very tight and limited into IR despite heavy manual treatment. Slight improvement today seen with ability to do regular supine CBA stretch with less pain and no discomfort anteriorly -  only stretch posteriorly. Encouraged frequent bouts of this during the day to assist with posterior capsule tightness. Increased to 30 degree incline [facility] program.  Continue PT to restore full PROM into shoulder IR and full pain-free AROM to right shoulder as well as full RC strength in order to return patient safely to PLOF. Prognosis: [x] Good [] Fair  [] Poor    Patient Requires Follow-up: [x] Yes  [] No    PLAN: 2x/week for 4 weeks 8/22/22  [x] Continue per plan of care [] Alter current plan (see comments)  [] Plan of care initiated [] Hold pending MD visit [] Discharge      Electronically signed by: Betsey Patterson, PT, DPT, OCS    Note: If patient does not return for scheduled/ recommended follow up visits, this note will serve as a discharge from care along with most recent update on progress.

## 2022-09-15 ENCOUNTER — HOSPITAL ENCOUNTER (OUTPATIENT)
Dept: PHYSICAL THERAPY | Age: 57
Setting detail: THERAPIES SERIES
Discharge: HOME | End: 2022-09-15
Payer: COMMERCIAL

## 2022-09-15 PROCEDURE — 97110 THERAPEUTIC EXERCISES: CPT

## 2022-09-15 PROCEDURE — 97140 MANUAL THERAPY 1/> REGIONS: CPT

## 2022-09-15 PROCEDURE — 97530 THERAPEUTIC ACTIVITIES: CPT

## 2022-09-15 PROCEDURE — 97112 NEUROMUSCULAR REEDUCATION: CPT

## 2022-09-15 NOTE — FLOWSHEET NOTE
[address] [facility], [place]       Physical Therapy Daily Treatment Note      Date:  9/15/2022    Patient Name:  Kena Murray    :  1965  MRN: [id number]  Restrictions/Precautions: prox humeral fx 22  Medical/Treatment Diagnosis Information:  Diagnosis: G74.297E (ICD-10-CM) - Closed fracture of proximal end of right humerus, unspecified fracture morphology, initial encounter; Z98.890, Z87.81 (ICD-10-CM) - S/P ORIF (open reduction internal fixation) fracture      DOS: 22       OPEN REDUCTION INTERNAL FIXATION PROXIMAL HUMERUS, RIGHT   Treating Diagnosis: dec ROM in R UE, weakness in R UE, pain in R shoulder     Insurance/Certification information:  PT Insurance Information: [facility]  Physician Information:   Dr. Brian Norman  Has the plan of care been signed (Y/N):        [x]  Yes  []  No     Date of Patient follow up with Physician: 10/25/22      Is this a Progress Report:     []  Yes  [x]  No        If Yes:  Date Range for reporting period:  Beginnin22  Ending:     Progress report will be due (10 Rx or 30 days whichever is less):       Recertification will be due (POC Duration  / 90 days whichever is less): 22        Visit # Insurance Allowable Auth Required   27 60 []  Yes [x]  No        Functional Scale:    OUTCOME MEASURE DATE SCORE   FOTO Shoulder 22 37   FOTO Shoulder 22 55   FOTO Shoulder 22 64   FOTO Shoulder 22 65   FOTO Shoulder 22 70   FOTO Shoulder 22 70   FOTO Shoulder 22 76           Latex Allergy:  [x]NO      []YES  Preferred Language for Healthcare:   [x]English       []other:     Pain level:  0-1/10     SUBJECTIVE:  4 months PO. Reports he is doing well today. No new concerns since last visit.   \    OBJECTIVE:   22  ROM PROM AROM  Comment     L R      L R    Flexion   164° with OH cane 150° 130° with shrug     Abduction  125° 165° 100° with shrug     ER @ 90 87 85°with cane T4 C7     IR (GH) @ 90 45 15° T6 L5        8/22/22  Strength L R Comment   Flexion 5 4+    Abduction 5 4    ER 5 4+     IR 5 5    Supraspinatus 5 4    Champagne Toast   4-     Lower Trap         Mid Trap         Rhomboids         Biceps         Triceps         Horizontal Abduction         Horizontal Adduction         Lats             Occupation/School: ; [employer];  Living Status/Prior Level of Function: Independent with ADLs and IADLs, unlimited prior to injury in R shoulder;    RESTRICTIONS/PRECAUTIONS: Prox humeral fracture on 5/3/22 w surg on 5/6/22- Aggressive PROM; begin strengthening ~6/29/22  Exercises/Interventions:   Exercise/Equipment Resistance/Repetitions Comments   Cardio/Warm-up     Bike          Stretching/PROM     HEP      IR behind back  3x30\" Belt up the back At wall to block shoulder  from tipping forward   Sleeper Stretch 3x30\"    Cross Body Adduction 3x30\" supine      Wall slides 10x10\" Flexion, scaption       Pec Stretch Doorway 3x30\"          Wrist flex/ext           STRENGTH     Isometrics     Retraction          Weight shift     HEP   HEP         Biceps     Triceps          PRE's           UK Flexion 3' 0# 30 deg incline at table   Abduction 3x10; 0#  Add 1# NPV Pain free AROM sidelying   External Rotation 3x10, 3#    Internal Rotation     IPush up plus     EXT        Reverse Flys        Prone T's 3x10, Bilateral, off EOB; 1# Prone row + ER           Horizontal Abd with ER        Triceps        Push up plus at wall 3x10         Cable Column/Theraband     External Rotation 10x10\";  Blue    Internal Rotation 3x10, Silver    Abduction at wall 2x10x5\" holds; Green  Shoulder blade squeeze, lift arms to about 45 deg abduction and hold   Lats     HEP   Flex     HEP   BIC     HEP   PNF          Neuromuscular Re-ed     Dynamic Stability           BoW x30ea up/down, L/R, Cw, Ccw 2# ball in 90 deg flex  And 90 deg abduction                            Manual/Modalities      PROM 5' IR     [address] Joint Mobs 5' Posterior, lateral and long-axis traction; Grade III/IV     Therapeutic Exercise and NMR EXR  [x] (20050) Provided verbal/tactile cueing for activities related to strengthening, flexibility, endurance, ROM  for improvements in scapular, scapulothoracic and UE control with self care, reaching, carrying, lifting, house/yardwork, driving/computer work.    [] (20229) Provided verbal/tactile cueing for activities related to improving balance, coordination, kinesthetic sense, posture, motor skill, proprioception  to assist with  scapular, scapulothoracic and UE control with self care, reaching, carrying, lifting, house/yardwork, driving/computer work. Therapeutic Activities:    [x] (40096 or 69015) Provided verbal/tactile cueing for activities related to improving balance, coordination, kinesthetic sense, posture, motor skill, proprioception and motor activation to allow for proper function of scapular, scapulothoracic and UE control with self care, carrying, lifting, driving/computer work. Home Exercise Program:    [x] (63370) Reviewed/Progressed HEP activities related to strengthening, flexibility, endurance, ROM of scapular, scapulothoracic and UE control with self care, reaching, carrying, lifting, house/yardwork, driving/computer work     Written HEP est    Access Code: [email or web address]  URL: [email or web address]  Date: 08/29/2022  Prepared by:  [employer]    Exercises  Doorway Rhomboid Stretch - 3 x daily - 7 x weekly - 10 reps - 10 hold  Sleeper Stretch - 3 x daily - 7 x weekly - 3 sets - 30\" hold  Standing Shoulder Internal Rotation AAROM Behind Back with Towel - 3 x daily - 7 x weekly - 1 sets - 10 reps - 10 hold  Doorway Pec Stretch at 90 Degrees Abduction - 1 x daily - 7 x weekly - 3 sets - 30\" hold  Supine Shoulder External Rotation with Dowel - 1 x daily - 7 x weekly - 10 reps - 10 hold  Shoulder Flexion Wall Slide with Towel - 1 x daily - 7 x weekly - 10 reps - 10 hold  Scaption Wall Slide with Towel - 1 x daily - 7 x weekly - 10 sets - 10\" hold  Supine Shoulder Flexion Extension AAROM with Dowel - 2 x daily - 7 x weekly - 10 reps - 10 hold  Standing Shoulder Internal Rotation with Anchored Resistance - 1 x daily - 4 x weekly - 3 sets - 10 reps  Shoulder External Rotation Reactive Isometrics - 1 x daily - 4 x weekly - 10 reps - 10\" hold  Sidelying Shoulder External Rotation - 1 x daily - 4 x weekly - 3 sets - 10 reps  Standing Wall Ball Circles with Mini Swiss Ball - 1 x daily - 4 x weekly - 3 sets - 10 reps  Wall Push Up with Plus - 1 x daily - 4 x weekly - 3 sets - 10 reps  Supine Shoulder Flexion Extension Full Range AROM - 1 x daily - 7 x weekly - 3 to 5 min        [x] (84954) Reviewed/Progressed HEP activities related to improving balance, coordination, kinesthetic sense, posture, motor skill, proprioception of scapular, scapulothoracic and UE control with self care, reaching, carrying, lifting, house/yardwork, driving/computer work      Manual Treatments:  PROM / STM / Oscillations-Mobs:  G-I, II, III, IV (PA's, Inf., Post.)  [x] (09815) Provided manual therapy to mobilize soft tissue/joints of cervical/CT, scapular GHJ and UE for the purpose of modulating pain, promoting relaxation,  increasing ROM, reducing/eliminating soft tissue swelling/inflammation/restriction, improving soft tissue extensibility and allowing for proper ROM for normal function with self care, reaching, carrying, lifting, house/yardwork, driving/computer work      Modalities:  CP 15'     [] GAME READY (VASO)- for significant edema, swelling, pain control.        Charges:  Timed Code Treatment Minutes: 54'   Total Treatment Minutes: 70'   [id number]    [] EVAL (LOW) 18019 (typically 20 minutes face-to-face)  [] EVAL (MOD) 00736 (typically 30 minutes face-to-face)  [] EVAL (HIGH) 31027 (typically 45 minutes face-to-face)  [] RE-EVAL   [x] KV(92695) x 1  [] IONTO  [x] NMR (05671) x  1  [] VASO  [x] Manual (15911) x 1    [] Other:  [x] TA (39645) x 1   [] [facility] Traction (33981)  [] ES(attended) (70975)      [] ES (un) (19642):     GOALS:  Patient stated goal: regain full strength and function in R shoulder  [x] Progressing: [] Met: [] Not Met: [] Adjusted    Therapist goals for Patient:   Short Term Goals: To be achieved in: 2 weeks  1. Independent in HEP and progression per patient tolerance, in order to prevent re-injury. [] Progressing: [x] Met: [] Not Met: [] Adjusted  2. Patient will have a decrease in pain to facilitate improvement in movement, function, and ADLs as indicated by Functional Deficits. [] Progressing: [x] Met: [] Not Met: [] Adjusted    Long Term Goals: To be achieved in: 12 weeks  1. Disability index score of 67 or more for the FOTO to assist with reaching prior level of function. [] Progressing: [x] Met: [] Not Met: [] Adjusted  2. Patient will demonstrate increased AROM to 170+ elevation, 90 ER, and reach mid back to allow for proper joint functioning as indicated by patients Functional Deficits. [x] Progressing: [] Met: [] Not Met: [] Adjusted  3. Patient will demonstrate an increase in Strength to 5/5 to allow for proper functional mobility as indicated by patients Functional Deficits. [x] Progressing: [] Met: [] Not Met: [] Adjusted  4. Patient will return to 90% functional activities without increased symptoms or restriction. [x] Progressing: [] Met: [] Not Met: [] Adjusted  5. Pt will be able to return to playing siddhartha ball w/o restrictions.(patient specific functional goal)    [x] Progressing: [] Met: [] Not Met: [] Adjusted    Overall Progression Towards Functional goals/ Treatment Progress Update:  [x] Patient is progressing as expected towards functional goals listed. [x] Progression is slowed due to complexities/Impairments listed. - joint stiffness, possible RC dysfunction  [] Progression has been slowed due to co-morbidities.   [] Plan just implemented, too soon to assess goals progression <30days   [] Goals require adjustment due to lack of progress  [] Patient is not progressing as expected and requires additional follow up with physician  [] Other    ASSESSMENT:      Treatment/Activity Tolerance:  [x] Patient tolerated treatment well [] Patient limited by fatique  [] Patient limited by pain  [] Patient limited by other medical complications  [x] Other:  Shoulder IR PROM starting to slowly improving as his [facility] joint mobility improves. Patient reporting improvement with cross body reach / function as his posterior [facility] joint mobility improves. Still fatigues quickly with 3' at 30 degree incline on [place] Deltoid program but this is done last during visit when his muscle fatigue is already high. Continue PT to restore full PROM into shoulder IR and full pain-free AROM to right shoulder as well as full RC strength in order to return patient safely to PLOF. Prognosis: [x] Good [] Fair  [] Poor    Patient Requires Follow-up: [x] Yes  [] No    PLAN: 2x/week for 4 weeks 8/22/22  [x] Continue per plan of care [] Alter current plan (see comments)  [] Plan of care initiated [] Hold pending MD visit [] Discharge      Electronically signed by: Luis Miguel Santiago, PT, DPT, OCS    Note: If patient does not return for scheduled/ recommended follow up visits, this note will serve as a discharge from care along with most recent update on progress.

## 2022-09-19 ENCOUNTER — HOSPITAL ENCOUNTER (OUTPATIENT)
Dept: PHYSICAL THERAPY | Age: 57
Setting detail: THERAPIES SERIES
Discharge: HOME | End: 2022-09-19
Payer: COMMERCIAL

## 2022-09-19 PROCEDURE — 97530 THERAPEUTIC ACTIVITIES: CPT

## 2022-09-19 PROCEDURE — 97110 THERAPEUTIC EXERCISES: CPT

## 2022-09-19 PROCEDURE — 97140 MANUAL THERAPY 1/> REGIONS: CPT

## 2022-09-19 PROCEDURE — 97112 NEUROMUSCULAR REEDUCATION: CPT

## 2022-09-19 NOTE — FLOWSHEET NOTE
[address] [facility], [place]       Physical Therapy Daily Treatment Note      Date:  2022    Patient Name:  Miranda Carrington    :  1965  MRN: [id number]  Restrictions/Precautions: prox humeral fx 22  Medical/Treatment Diagnosis Information:  Diagnosis: G84.975F (ICD-10-CM) - Closed fracture of proximal end of right humerus, unspecified fracture morphology, initial encounter; Z98.890, Z87.81 (ICD-10-CM) - S/P ORIF (open reduction internal fixation) fracture      DOS: 22       OPEN REDUCTION INTERNAL FIXATION PROXIMAL HUMERUS, RIGHT   Treating Diagnosis: dec ROM in R UE, weakness in R UE, pain in R shoulder     Insurance/Certification information:  PT Insurance Information: R  Physician Information:   Dr. Barone Alu  Has the plan of care been signed (Y/N):        [x]  Yes  []  No     Date of Patient follow up with Physician: 10/25/22      Is this a Progress Report:     []  Yes  [x]  No        If Yes:  Date Range for reporting period:  Beginnin22  Ending:     Progress report will be due (10 Rx or 30 days whichever is less):       Recertification will be due (POC Duration  / 90 days whichever is less): 22        Visit # Insurance Allowable Auth Required   28 60 []  Yes [x]  No        Functional Scale:    OUTCOME MEASURE DATE SCORE   FOTO Shoulder 22 37   FOTO Shoulder 22 55   FOTO Shoulder 22 64   FOTO Shoulder 22 65   FOTO Shoulder 22 70   FOTO Shoulder 22 70   FOTO Shoulder 22 76           Latex Allergy:  [x]NO      []YES  Preferred Language for Healthcare:   [x]English       []other:     Pain level:  0-1/10     SUBJECTIVE:  4.5 months PO. No pain this morning, just stiffness. Should seemed to hold up well over the weekend with minimal pain.        OBJECTIVE:     22  ROM PROM AROM  Comment     L R      L R    Flexion   164° with OH cane 150° 130° with shrug     Abduction  125° 165° 100° with shrug ER @ 90 87 85°with cane T4 C7     IR (GH) @ 90 45 26°  35° full IR T6 L2        8/22/22  Strength L R Comment   Flexion 5 4+    Abduction 5 4    ER 5 4+     IR 5 5    Supraspinatus 5 4    Champagne Toast   4-     Lower Trap         Mid Trap         Rhomboids         Biceps         Triceps         Horizontal Abduction         Horizontal Adduction         Lats             Occupation/School: ; [employer];  Living Status/Prior Level of Function: Independent with ADLs and IADLs, unlimited prior to injury in R shoulder;    RESTRICTIONS/PRECAUTIONS: Prox humeral fracture on 5/3/22 w surg on 5/6/22- Aggressive PROM; begin strengthening ~6/29/22  Exercises/Interventions:   Exercise/Equipment Resistance/Repetitions Comments   Cardio/Warm-up     Bike          Stretching/PROM     HEP      IR behind back  3x30\" Belt up the back At wall to block shoulder  from tipping forward   Sleeper Stretch 3x30\"    Cross Body Adduction 3x30\" supine      Wall slides 10x10\" Flexion, scaption       Pec Stretch Doorway 3x30\"          Wrist flex/ext           STRENGTH     Isometrics     Retraction          Weight shift     HEP   HEP         Biceps     Triceps          PRE's           UK Flexion 3' 0# 30 deg incline at table   Abduction 3x10; 1# Pain free AROM sidelying      ER at 90 3x10; 0# Seated at table; arm propped up to 90 deg abduction on wedge/pillow   Internal Rotation     IPush up plus     EXT        Reverse Flys        Prone T's 3x10, Bilateral, off EOB; 1# Prone row + ER           Horizontal Abd with ER        Triceps        Push up plus at wall 3x10         Cable Column/Theraband     External Rotation 10x10\";  Black    Internal Rotation 3x15, Silver    Abduction at wall 2x10x5\" holds; Green  Shoulder blade squeeze, lift arms to about 45 deg abduction and hold   Lats     HEP   Flex     HEP   BIC     HEP   PNF          Neuromuscular Re-ed     Dynamic Stability           BoW x30ea up/down, L/R, Cw, Ccw 2# ball in 90 deg flex  And 90 deg abduction                            Manual/Modalities      PROM 5' IR     [address] Joint Mobs 5' Posterior, lateral and long-axis traction; Grade III/IV     Therapeutic Exercise and NMR EXR  [x] (51566) Provided verbal/tactile cueing for activities related to strengthening, flexibility, endurance, ROM  for improvements in scapular, scapulothoracic and UE control with self care, reaching, carrying, lifting, house/yardwork, driving/computer work.    [] (72614) Provided verbal/tactile cueing for activities related to improving balance, coordination, kinesthetic sense, posture, motor skill, proprioception  to assist with  scapular, scapulothoracic and UE control with self care, reaching, carrying, lifting, house/yardwork, driving/computer work. Therapeutic Activities:    [x] (75312 or 22694) Provided verbal/tactile cueing for activities related to improving balance, coordination, kinesthetic sense, posture, motor skill, proprioception and motor activation to allow for proper function of scapular, scapulothoracic and UE control with self care, carrying, lifting, driving/computer work. Home Exercise Program:    [x] (38381) Reviewed/Progressed HEP activities related to strengthening, flexibility, endurance, ROM of scapular, scapulothoracic and UE control with self care, reaching, carrying, lifting, house/yardwork, driving/computer work     Written HEP est    Access Code: [email or web address]  URL: [email or web address]  Date: 08/29/2022  Prepared by:  [employer]    Exercises  Doorway Rhomboid Stretch - 3 x daily - 7 x weekly - 10 reps - 10 hold  Sleeper Stretch - 3 x daily - 7 x weekly - 3 sets - 30\" hold  Standing Shoulder Internal Rotation AAROM Behind Back with Towel - 3 x daily - 7 x weekly - 1 sets - 10 reps - 10 hold  Doorway Pec Stretch at 90 Degrees Abduction - 1 x daily - 7 x weekly - 3 sets - 30\" hold  Supine Shoulder External Rotation with Dowel - 1 x daily - 7 x weekly - 10 reps - 10 hold  Shoulder Flexion Wall Slide with Towel - 1 x daily - 7 x weekly - 10 reps - 10 hold  Scaption Wall Slide with Towel - 1 x daily - 7 x weekly - 10 sets - 10\" hold  Supine Shoulder Flexion Extension AAROM with Dowel - 2 x daily - 7 x weekly - 10 reps - 10 hold  Standing Shoulder Internal Rotation with Anchored Resistance - 1 x daily - 4 x weekly - 3 sets - 10 reps  Shoulder External Rotation Reactive Isometrics - 1 x daily - 4 x weekly - 10 reps - 10\" hold  Sidelying Shoulder External Rotation - 1 x daily - 4 x weekly - 3 sets - 10 reps  Standing Wall Ball Circles with Mini Swiss Ball - 1 x daily - 4 x weekly - 3 sets - 10 reps  Wall Push Up with Plus - 1 x daily - 4 x weekly - 3 sets - 10 reps  Supine Shoulder Flexion Extension Full Range AROM - 1 x daily - 7 x weekly - 3 to 5 min        [x] (86974) Reviewed/Progressed HEP activities related to improving balance, coordination, kinesthetic sense, posture, motor skill, proprioception of scapular, scapulothoracic and UE control with self care, reaching, carrying, lifting, house/yardwork, driving/computer work      Manual Treatments:  PROM / STM / Oscillations-Mobs:  G-I, II, III, IV (PA's, Inf., Post.)  [x] (85852) Provided manual therapy to mobilize soft tissue/joints of cervical/CT, scapular GHJ and UE for the purpose of modulating pain, promoting relaxation,  increasing ROM, reducing/eliminating soft tissue swelling/inflammation/restriction, improving soft tissue extensibility and allowing for proper ROM for normal function with self care, reaching, carrying, lifting, house/yardwork, driving/computer work      Modalities:  CP 15'     [] GAME READY (VASO)- for significant edema, swelling, pain control.        Charges:  Timed Code Treatment Minutes: 54'   Total Treatment Minutes: 70'   [id number]    [] EVAL (LOW) 13998 (typically 20 minutes face-to-face)  [] EVAL (MOD) 40195 (typically 30 minutes face-to-face)  [] EVAL (HIGH) 26612 (typically 45 minutes face-to-face)  [] RE-EVAL   [x] VL(69707) x 1  [] IONTO  [x] NMR (20149) x  1  [] VASO  [x] Manual (33323) x 1    [] Other:  [x] TA (83762) x 1   [] [employer]ch Traction (51727)  [] ES(attended) (13151)      [] ES (un) (19725):     GOALS:  Patient stated goal: regain full strength and function in R shoulder  [x] Progressing: [] Met: [] Not Met: [] Adjusted    Therapist goals for Patient:   Short Term Goals: To be achieved in: 2 weeks  1. Independent in HEP and progression per patient tolerance, in order to prevent re-injury. [] Progressing: [x] Met: [] Not Met: [] Adjusted  2. Patient will have a decrease in pain to facilitate improvement in movement, function, and ADLs as indicated by Functional Deficits. [] Progressing: [x] Met: [] Not Met: [] Adjusted    Long Term Goals: To be achieved in: 12 weeks  1. Disability index score of 67 or more for the FOTO to assist with reaching prior level of function. [] Progressing: [x] Met: [] Not Met: [] Adjusted  2. Patient will demonstrate increased AROM to 170+ elevation, 90 ER, and reach mid back to allow for proper joint functioning as indicated by patients Functional Deficits. [x] Progressing: [] Met: [] Not Met: [] Adjusted  3. Patient will demonstrate an increase in Strength to 5/5 to allow for proper functional mobility as indicated by patients Functional Deficits. [x] Progressing: [] Met: [] Not Met: [] Adjusted  4. Patient will return to 90% functional activities without increased symptoms or restriction. [x] Progressing: [] Met: [] Not Met: [] Adjusted  5. Pt will be able to return to playing siddhartha ball w/o restrictions.(patient specific functional goal)    [x] Progressing: [] Met: [] Not Met: [] Adjusted    Overall Progression Towards Functional goals/ Treatment Progress Update:  [x] Patient is progressing as expected towards functional goals listed. [x] Progression is slowed due to complexities/Impairments listed.   - joint stiffness, possible RC dysfunction  [] Progression has been slowed due to co-morbidities. [] Plan just implemented, too soon to assess goals progression <30days   [] Goals require adjustment due to lack of progress  [] Patient is not progressing as expected and requires additional follow up with physician  [] Other    ASSESSMENT:      Treatment/Activity Tolerance:  [x] Patient tolerated treatment well [] Patient limited by fatique  [] Patient limited by pain  [] Patient limited by other medical complications  [x] Other: IR ROM up 10 degrees passively from last month and functionally moved from L5 to L2. Still remains very stiff/tight at end range but not painful. Strength slowly improving but still fatigues after just 3 minutes on [place] flexion at 30 deg incline. Continue PT to restore full PROM into shoulder IR and full pain-free AROM to right shoulder as well as full RC strength in order to return patient safely to PLOF. Prognosis: [x] Good [] Fair  [] Poor    Patient Requires Follow-up: [x] Yes  [] No    PLAN: 2x/week for 4 weeks 8/22/22  [x] Continue per plan of care [] Alter current plan (see comments)  [] Plan of care initiated [] Hold pending MD visit [] Discharge      Electronically signed by: Mauricio De Souza, PT, DPT, OCS    Note: If patient does not return for scheduled/ recommended follow up visits, this note will serve as a discharge from care along with most recent update on progress.

## 2022-09-22 ENCOUNTER — HOSPITAL ENCOUNTER (OUTPATIENT)
Dept: PHYSICAL THERAPY | Age: 57
Setting detail: THERAPIES SERIES
Discharge: HOME | End: 2022-09-22
Payer: COMMERCIAL

## 2022-09-22 PROCEDURE — 97110 THERAPEUTIC EXERCISES: CPT

## 2022-09-22 PROCEDURE — 97530 THERAPEUTIC ACTIVITIES: CPT

## 2022-09-22 PROCEDURE — 97112 NEUROMUSCULAR REEDUCATION: CPT

## 2022-09-22 PROCEDURE — 97140 MANUAL THERAPY 1/> REGIONS: CPT

## 2022-09-22 NOTE — PLAN OF CARE
limitations. Date range of Visits: 22-22       Total Visits: 29      Physical Therapy Daily Treatment Note      Date:  2022    Patient Name:  Abraham Santana    :  1965  MRN: [id number]  Restrictions/Precautions: prox humeral fx 22  Medical/Treatment Diagnosis Information:  Diagnosis: S42.201A (ICD-10-CM) - Closed fracture of proximal end of right humerus, unspecified fracture morphology, initial encounter; Z98.890, Z87.81 (ICD-10-CM) - S/P ORIF (open reduction internal fixation) fracture      DOS: 22       OPEN REDUCTION INTERNAL FIXATION PROXIMAL HUMERUS, RIGHT   Treating Diagnosis: dec ROM in R UE, weakness in R UE, pain in R shoulder     Insurance/Certification information:  PT Insurance Information: [facility]  Physician Information:   Dr. Russell Pires  Has the plan of care been signed (Y/N):        [x]  Yes  []  No     Date of Patient follow up with Physician: 10/25/22      Is this a Progress Report:     [x]  Yes  []  No        If Yes:  Date Range for reporting period:  Beginnin22  Endin22    Progress report will be due (10 Rx or 30 days whichever is less):       Recertification will be due (POC Duration  / 90 days whichever is less): 10/22/22        Visit # Insurance Allowable Auth Required   29 60 []  Yes [x]  No        Functional Scale:    OUTCOME MEASURE DATE SCORE   FOTO Shoulder 22 37   FOTO Shoulder 22 55   FOTO Shoulder 22 64   FOTO Shoulder 22 65   FOTO Shoulder 22 70   FOTO Shoulder 22 70   FOTO Shoulder 22 76   FOTO Shoulder 22 71           Latex Allergy:  [x]NO      []YES  Preferred Language for Healthcare:   [x]English       []other:     Pain level:  0/10     SUBJECTIVE:  4.5 months PO. Doing well today. No new issues to report since last visit. No pain at rest. Feels function at home and around his house improving each week. Reports greater ease reaching across his body.       OBJECTIVE:     22  ROM PROM AROM  Comment     L R      L R    Flexion   160° with OH cane 150° 130° with shrug     Abduction  125° 165° 120° with shrug     ER @ 90 87 80°cold T4 C7     IR (GH) @ 90 45 26°  35° full IR T6 L2        9/22/22  Strength L R Comment   Flexion 5 5    Abduction 5 4+    ER 5 5     IR 5 5    Supraspinatus 5 4+    Champagne Toast   5     Lower Trap         Mid Trap         Rhomboids         Biceps         Triceps         Horizontal Abduction         Horizontal Adduction         Lats             Occupation/School: ; [employer] and walking;  Living Status/Prior Level of Function: Independent with ADLs and IADLs, unlimited prior to injury in R shoulder;    RESTRICTIONS/PRECAUTIONS: Prox humeral fracture on 5/3/22 w surg on 5/6/22- Aggressive PROM; begin strengthening ~6/29/22  Exercises/Interventions:   Exercise/Equipment Resistance/Repetitions Comments   Cardio/Warm-up     Bike          Stretching/PROM     HEP      IR behind back  3x30\" Belt up the back At wall to block shoulder  from tipping forward   Sleeper Stretch 3x30\"    Cross Body Adduction 3x30\" supine      Wall slides 10x10\" Flexion, scaption       Pec Stretch Doorway 3x30\"          Wrist flex/ext           STRENGTH     Isometrics     Retraction          Weight shift     HEP   HEP         Biceps     Triceps          PRE's           UK Flexion 3' 0# 30 deg incline at table   Abduction 3x10; 1# Pain free AROM sidelying      ER at 90 3x10; 0# Seated at table; arm propped up to 90 deg abduction on wedge/pillow   Internal Rotation     IPush up plus     EXT        Reverse Flys        Prone T's 3x10, Bilateral, off EOB; 1# Prone row + ER           Horizontal Abd with ER        Triceps        Push up plus at wall 3x10         Cable Column/Theraband     External Rotation 10x10\";  Black    Internal Rotation 3x15, Silver    Abduction at wall 2x10x5\" holds; Green  Shoulder blade squeeze, lift arms to about 45 deg abduction and hold   Lats     HEP   Flex     HEP daily - 7 x weekly - 5 reps - 10-30 hold  Doorway Pec Stretch at 90 Degrees Abduction - 2 x daily - 7 x weekly - 3 sets - 30\" hold  Shoulder Flexion Wall Slide with Towel - 2 x daily - 7 x weekly - 10 reps - 10 hold  Scaption Wall Slide with Towel - 2 x daily - 7 x weekly - 10 sets - 10\" hold  Supine Shoulder Flexion Extension AAROM with Dowel - 2 x daily - 7 x weekly - 10 reps - 10 hold  Sidelying Shoulder External Rotation - 1 x daily - 2-3 x weekly - 3 sets - 10 reps  Prone Shoulder Horizontal Abduction with External Rotation - 1 x daily - 2-3 x weekly - 3 sets - 10 reps  Standing Shoulder Internal Rotation with Anchored Resistance - 1 x daily - 2-3 x weekly - 3 sets - 10 reps  Standing Wall Ball Circles with Mini Swiss Ball - 1 x daily - 2-3 x weekly - 3 sets - 10 reps  Standing Wall Ball Circles in Scaption with Mini Swiss Ball - 1 x daily - 2-3 x weekly - 3 sets - 10 reps  Wall Push Up with Plus - 1 x daily - 2-3 x weekly - 3 sets - 10 reps  Standing Shoulder External Rotation at 90 Degrees Abduction with Dumbbell - 1 x daily - 2-3 x weekly - 3 sets - 10 reps  Supine Shoulder Flexion Extension Full Range AROM - 1 x daily - 7 x weekly - 3 to 5 min  Sidelying Shoulder Abduction Palm Forward - 1 x daily - 7 x weekly - 3 sets - 10 reps          [x] (81600) Reviewed/Progressed HEP activities related to improving balance, coordination, kinesthetic sense, posture, motor skill, proprioception of scapular, scapulothoracic and UE control with self care, reaching, carrying, lifting, house/yardwork, driving/computer work      Manual Treatments:  PROM / STM / Oscillations-Mobs:  G-I, II, III, IV (PA's, Inf., Post.)  [x] (57213) Provided manual therapy to mobilize soft tissue/joints of cervical/CT, scapular GHJ and UE for the purpose of modulating pain, promoting relaxation,  increasing ROM, reducing/eliminating soft tissue swelling/inflammation/restriction, improving soft tissue extensibility and allowing for proper ROM for normal function with self care, reaching, carrying, lifting, house/yardwork, driving/computer work      Modalities:  Decline     [] GAME READY (VASO)- for significant edema, swelling, pain control. Charges:  Timed Code Treatment Minutes: 54'   Total Treatment Minutes: 55'   [id number]    [] EVAL (LOW) 455 1011 (typically 20 minutes face-to-face)  [] EVAL (MOD) 85118 (typically 30 minutes face-to-face)  [] EVAL (HIGH) 78237 (typically 45 minutes face-to-face)  [] RE-EVAL   [x] XY(15275) x 1  [] IONTO  [x] NMR (81446) x  1  [] VASO  [x] Manual (89807) x 1    [] Other:  [x] TA (86705) x 1   [] Mech Traction (46926)  [] ES(attended) (79719)      [] ES (un) (69617):     GOALS:  Patient stated goal: regain full strength and function in R shoulder  [x] Progressing: [] Met: [] Not Met: [] Adjusted    Therapist goals for Patient:   Short Term Goals: To be achieved in: 2 weeks  1. Independent in HEP and progression per patient tolerance, in order to prevent re-injury. [] Progressing: [x] Met: [] Not Met: [] Adjusted  2. Patient will have a decrease in pain to facilitate improvement in movement, function, and ADLs as indicated by Functional Deficits. [] Progressing: [x] Met: [] Not Met: [] Adjusted    Long Term Goals: To be achieved in: 12 weeks  1. Disability index score of 67 or more for the FOTO to assist with reaching prior level of function. [] Progressing: [x] Met: [] Not Met: [] Adjusted  2. Patient will demonstrate increased AROM to 170+ elevation, 90 ER, and reach mid back to allow for proper joint functioning as indicated by patients Functional Deficits. [x] Progressing: [] Met: [] Not Met: [] Adjusted  3. Patient will demonstrate an increase in Strength to 5/5 to allow for proper functional mobility as indicated by patients Functional Deficits. [x] Progressing: [] Met: [] Not Met: [] Adjusted  4. Patient will return to 90% functional activities without increased symptoms or restriction.    [x]

## 2022-10-01 PROBLEM — Z12.5 SCREENING PSA (PROSTATE SPECIFIC ANTIGEN): Status: RESOLVED | Noted: 2022-09-01 | Resolved: 2022-10-01

## 2022-10-03 ENCOUNTER — HOSPITAL ENCOUNTER (OUTPATIENT)
Dept: PHYSICAL THERAPY | Age: 57
Setting detail: THERAPIES SERIES
Discharge: HOME | End: 2022-10-03
Payer: COMMERCIAL

## 2022-10-03 PROCEDURE — 97112 NEUROMUSCULAR REEDUCATION: CPT

## 2022-10-03 PROCEDURE — 97110 THERAPEUTIC EXERCISES: CPT

## 2022-10-03 PROCEDURE — 97140 MANUAL THERAPY 1/> REGIONS: CPT

## 2022-10-03 PROCEDURE — 97530 THERAPEUTIC ACTIVITIES: CPT

## 2022-10-03 NOTE — FLOWSHEET NOTE
[address] [facility], [place]           Physical Therapy Daily Treatment Note      Date:  10/3/2022    Patient Name:  Kaleb Hopson    :  1965  MRN: [id number]  Restrictions/Precautions: prox humeral fx 22  Medical/Treatment Diagnosis Information:  Diagnosis: X19.088W (ICD-10-CM) - Closed fracture of proximal end of right humerus, unspecified fracture morphology, initial encounter; Z98.890, Z87.81 (ICD-10-CM) - S/P ORIF (open reduction internal fixation) fracture      DOS: 22       OPEN REDUCTION INTERNAL FIXATION PROXIMAL HUMERUS, RIGHT   Treating Diagnosis: dec ROM in R UE, weakness in R UE, pain in R shoulder     Insurance/Certification information:  PT Insurance Information: R  Physician Information:   Dr. Filomena Moran  Has the plan of care been signed (Y/N):        [x]  Yes  []  No     Date of Patient follow up with Physician: 10/25/22      Is this a Progress Report:     []  Yes  [x]  No        If Yes:  Date Range for reporting period:  Beginnin22  Ending:     Progress report will be due (10 Rx or 30 days whichever is less):       Recertification will be due (POC Duration  / 90 days whichever is less): 10/22/22        Visit # Insurance Allowable Auth Required   30 60 []  Yes [x]  No        Functional Scale:    OUTCOME MEASURE DATE SCORE   FOTO Shoulder 22 37   FOTO Shoulder 22 55   FOTO Shoulder 22 64   FOTO Shoulder 22 65   FOTO Shoulder 22 70   FOTO Shoulder 22 70   FOTO Shoulder 22 76   FOTO Shoulder 22 71           Latex Allergy:  [x]NO      []YES  Preferred Language for Healthcare:   [x]English       []other:     Pain level:  0/10     SUBJECTIVE:  5 months PO. Doing well today. Able to do most of stretches while gone on vacation last week. Shoulder a bit tired from extent / length of drive.      OBJECTIVE:     22  ROM PROM AROM  Comment     L R      L R    Flexion   160° with OH cane 150° 130° with shrug     Abduction  125° 165° 120° with shrug     ER @ 90 87 80°cold T4 C7     IR (GH) @ 90 45 26°  35° full IR T6 L2        9/22/22  Strength L R Comment   Flexion 5 5    Abduction 5 4+    ER 5 5     IR 5 5    Supraspinatus 5 4+    Champagne Toast   5     Lower Trap         Mid Trap         Rhomboids         Biceps         Triceps         Horizontal Abduction         Horizontal Adduction         Lats             Occupation/School: ; [employer] and walking;  Living Status/Prior Level of Function: Independent with ADLs and IADLs, unlimited prior to injury in R shoulder;    RESTRICTIONS/PRECAUTIONS: Prox humeral fracture on 5/3/22 w surg on 5/6/22- Aggressive PROM; begin strengthening ~6/29/22  Exercises/Interventions:   Exercise/Equipment Resistance/Repetitions Comments   Cardio/Warm-up     Bike          Stretching/PROM     HEP      IR behind back  3x30\" Belt up the back At wall to block shoulder  from tipping forward   Sleeper Stretch 3x30\"    Cross Body Adduction 3x30\" supine      Wall slides 10x10\" Flexion, scaption       Pec Stretch Doorway 3x30\"          Wrist flex/ext           STRENGTH     Isometrics     Retraction          Weight shift     HEP   HEP         Biceps     Triceps          PRE's           UK Flexion 4' 0# 30 deg incline at table   Abduction 3x10; 1# Pain free AROM sidelying      Internal Rotation     I     EXT        Reverse Flys        Prone row + ER                   Triceps        Push up plus at table 2x10         Cable Column/Theraband     External Rotation 3x5 Red at 90/90    Internal Rotation 10x10\" Blue at 90/90    Abduction at wall 2x10x5\" holds; Green  Shoulder blade squeeze, lift arms to about 45 deg abduction and hold   Lats     HEP   Flex     HEP   BIC     HEP   PNF          Neuromuscular Re-ed     Dynamic Stability           BoW 2x30ea Cw, Ccw 2# ball in 90 deg flex  And 90 deg abduction                            Manual/Modalities      PROM 5' IR     [facility] Joint Slide with Towel - 2 x daily - 7 x weekly - 10 sets - 10\" hold  Supine Shoulder Flexion Extension AAROM with Dowel - 2 x daily - 7 x weekly - 10 reps - 10 hold  Sidelying Shoulder External Rotation - 1 x daily - 2-3 x weekly - 3 sets - 10 reps  Prone Shoulder Horizontal Abduction with External Rotation - 1 x daily - 2-3 x weekly - 3 sets - 10 reps  Standing Shoulder Internal Rotation with Anchored Resistance - 1 x daily - 2-3 x weekly - 3 sets - 10 reps  Standing Wall [employer] with Mini Swiss Ball - 1 x daily - 2-3 x weekly - 3 sets - 10 reps  Standing Wall Ball Circles in Scaption with Mini Swiss Ball - 1 x daily - 2-3 x weekly - 3 sets - 10 reps  Wall Push Up with Plus - 1 x daily - 2-3 x weekly - 3 sets - 10 reps  Standing Shoulder External Rotation at 90 Degrees Abduction with Dumbbell - 1 x daily - 2-3 x weekly - 3 sets - 10 reps  Supine Shoulder Flexion Extension Full Range AROM - 1 x daily - 7 x weekly - 3 to 5 min  Sidelying Shoulder Abduction Palm Forward - 1 x daily - 7 x weekly - 3 sets - 10 reps          [x] (51846) Reviewed/Progressed HEP activities related to improving balance, coordination, kinesthetic sense, posture, motor skill, proprioception of scapular, scapulothoracic and UE control with self care, reaching, carrying, lifting, house/yardwork, driving/computer work      Manual Treatments:  PROM / STM / Oscillations-Mobs:  G-I, II, III, IV (PA's, Inf., Post.)  [x] (47750) Provided manual therapy to mobilize soft tissue/joints of cervical/CT, scapular GHJ and UE for the purpose of modulating pain, promoting relaxation,  increasing ROM, reducing/eliminating soft tissue swelling/inflammation/restriction, improving soft tissue extensibility and allowing for proper ROM for normal function with self care, reaching, carrying, lifting, house/yardwork, driving/computer work      Modalities:  CP 15'     [] GAME READY (VASO)- for significant edema, swelling, pain control.        Charges:  Timed Code Treatment Minutes: 54'   Total Treatment Minutes: 79'   [id number]    [] EVAL (LOW) 455 1011 (typically 20 minutes face-to-face)  [] EVAL (MOD) 36675 (typically 30 minutes face-to-face)  [] EVAL (HIGH) 43531 (typically 45 minutes face-to-face)  [] RE-EVAL   [x] GJ(49584) x 1  [] IONTO  [x] NMR (54787) x  1  [] VASO  [x] Manual (16441) x 1    [] Other:  [x] TA (59872) x 1   [] Mech Traction (22991)  [] ES(attended) (37132)      [] ES (un) (45872):     GOALS:  Patient stated goal: regain full strength and function in R shoulder  [x] Progressing: [] Met: [] Not Met: [] Adjusted    Therapist goals for Patient:   Short Term Goals: To be achieved in: 2 weeks  1. Independent in HEP and progression per patient tolerance, in order to prevent re-injury. [] Progressing: [x] Met: [] Not Met: [] Adjusted  2. Patient will have a decrease in pain to facilitate improvement in movement, function, and ADLs as indicated by Functional Deficits. [] Progressing: [x] Met: [] Not Met: [] Adjusted    Long Term Goals: To be achieved in: 12 weeks  1. Disability index score of 67 or more for the FOTO to assist with reaching prior level of function. [] Progressing: [x] Met: [] Not Met: [] Adjusted  2. Patient will demonstrate increased AROM to 170+ elevation, 90 ER, and reach mid back to allow for proper joint functioning as indicated by patients Functional Deficits. [x] Progressing: [] Met: [] Not Met: [] Adjusted  3. Patient will demonstrate an increase in Strength to 5/5 to allow for proper functional mobility as indicated by patients Functional Deficits. [x] Progressing: [] Met: [] Not Met: [] Adjusted  4. Patient will return to 90% functional activities without increased symptoms or restriction. [x] Progressing: [] Met: [] Not Met: [] Adjusted  5.  Pt will be able to return to playing siddhartha ball w/o restrictions.(patient specific functional goal)    [x] Progressing: [] Met: [] Not Met: [] Adjusted    Overall Progression Towards Functional goals/ Treatment Progress Update:  [x] Patient is progressing as expected towards functional goals listed. [x] Progression is slowed due to complexities/Impairments listed. - joint stiffness, possible RC dysfunction  [] Progression has been slowed due to co-morbidities. [] Plan just implemented, too soon to assess goals progression <30days   [] Goals require adjustment due to lack of progress  [] Patient is not progressing as expected and requires additional follow up with physician  [] Other    ASSESSMENT:      Treatment/Activity Tolerance:  [x] Patient tolerated treatment well [] Patient limited by fatique  [] Patient limited by pain  [] Patient limited by other medical complications  [x] Other: Progressed IR and ER strengthening to 90 / 90 position which was significant fatiguing for patient. Biggest limitation remains IR stiffness and rotator cuff deficiency with shrug sign present with against gravity forward raising. Continue PT to restore full PROM into shoulder IR and full pain-free AROM to right shoulder as well as full RC strength in order to return patient safely to PLOF. Prognosis: [x] Good [] Fair  [] Poor    Patient Requires Follow-up: [x] Yes  [] No    PLAN: 2x/week for 4 weeks 9/22/22  [x] Continue per plan of care [] Alter current plan (see comments)  [] Plan of care initiated [] Hold pending MD visit [] Discharge      Electronically signed by: Tawanda Oleary, PT, DPT, OCS    Note: If patient does not return for scheduled/ recommended follow up visits, this note will serve as a discharge from care along with most recent update on progress.

## 2022-10-06 ENCOUNTER — APPOINTMENT (OUTPATIENT)
Dept: PHYSICAL THERAPY | Age: 57
End: 2022-10-06
Payer: COMMERCIAL

## 2022-10-07 ENCOUNTER — HOSPITAL ENCOUNTER (OUTPATIENT)
Dept: PHYSICAL THERAPY | Age: 57
Setting detail: THERAPIES SERIES
Discharge: HOME | End: 2022-10-07
Payer: COMMERCIAL

## 2022-10-07 PROCEDURE — 97112 NEUROMUSCULAR REEDUCATION: CPT

## 2022-10-07 PROCEDURE — 97530 THERAPEUTIC ACTIVITIES: CPT

## 2022-10-07 PROCEDURE — 97140 MANUAL THERAPY 1/> REGIONS: CPT

## 2022-10-07 PROCEDURE — 97110 THERAPEUTIC EXERCISES: CPT

## 2022-10-07 NOTE — FLOWSHEET NOTE
[address] [facility], [place]           Physical Therapy Daily Treatment Note      Date:  10/7/2022    Patient Name:  Shelia Snellen    :  1965  MRN: [id number]  Restrictions/Precautions: prox humeral fx 22  Medical/Treatment Diagnosis Information:  Diagnosis: N37.306U (ICD-10-CM) - Closed fracture of proximal end of right humerus, unspecified fracture morphology, initial encounter; Z98.890, Z87.81 (ICD-10-CM) - S/P ORIF (open reduction internal fixation) fracture      DOS: 22       OPEN REDUCTION INTERNAL FIXATION PROXIMAL HUMERUS, RIGHT   Treating Diagnosis: dec ROM in R UE, weakness in R UE, pain in R shoulder     Insurance/Certification information:  PT Insurance Information: [facility]  Physician Information:   Dr. Jose Lund  Has the plan of care been signed (Y/N):        [x]  Yes  []  No     Date of Patient follow up with Physician: 10/25/22      Is this a Progress Report:     []  Yes  [x]  No        If Yes:  Date Range for reporting period:  Beginnin22  Ending:     Progress report will be due (10 Rx or 30 days whichever is less):       Recertification will be due (POC Duration  / 90 days whichever is less): 10/22/22        Visit # Insurance Allowable Auth Required   31 60 []  Yes [x]  No        Functional Scale:    OUTCOME MEASURE DATE SCORE   FOTO Shoulder 22 37   FOTO Shoulder 22 55   FOTO Shoulder 22 64   FOTO Shoulder 22 65   FOTO Shoulder 22 70   FOTO Shoulder 22 70   FOTO Shoulder 22 76   FOTO Shoulder 22 71           Latex Allergy:  [x]NO      []YES  Preferred Language for Healthcare:   [x]English       []other:     Pain level:  0/10     SUBJECTIVE:  5 months PO. Doing well today. No pain after last visit and no new concerns.     OBJECTIVE:     22  ROM PROM AROM  Comment     L R      L R    Flexion   160° with OH cane 150° 130° with shrug     Abduction  125° 165° 120° with shrug     ER @ 90 87 80°cold T4 C7     IR (GH) @ 90 45 26°  35° full IR T6 L2        9/22/22  Strength L R Comment   Flexion 5 5    Abduction 5 4+    ER 5 5     IR 5 5    Supraspinatus 5 4+    Champagne Toast   5     Lower Trap         Mid Trap         Rhomboids         Biceps         Triceps         Horizontal Abduction         Horizontal Adduction         Lats             Occupation/School: ; [employer];  Living Status/Prior Level of Function: Independent with ADLs and IADLs, unlimited prior to injury in R shoulder;    RESTRICTIONS/PRECAUTIONS: Prox humeral fracture on 5/3/22 w surg on 5/6/22- Aggressive PROM; begin strengthening ~6/29/22  Exercises/Interventions:   Exercise/Equipment Resistance/Repetitions Comments   Cardio/Warm-up     Bike          Stretching/PROM     HEP      IR behind back  3x30\" Belt up the back At wall to block shoulder  from tipping forward   Sleeper Stretch 3x30\"    Cross Body Adduction 3x30\" supine      Wall slides 10x10\" Flexion, scaption       Pec Stretch Doorway 3x30\"          Wrist flex/ext           STRENGTH     Isometrics     Retraction          Weight shift     HEP   HEP         Biceps     Triceps          PRE's           UK Flexion 4' 0# 30 deg incline at table   Abduction 3x10; 2# Pain free AROM sidelying      Internal Rotation     I     EXT        Reverse Flys        Prone T's 3x10, Bilateral, off EOB; 1# Prone row + ER                   Triceps        Push up plus at table 2x10         Cable Column/Theraband     External Rotation 3x5 Red at 90/90    Internal Rotation 10x10\" Blue at 90/90    Abduction at wall 2x10x5\" holds;  Blue  Shoulder blade squeeze, lift arms to about 45 deg abduction and hold   Lats     HEP   Flex     HEP   BIC     HEP   PNF          Neuromuscular Re-ed     Dynamic Stability           BoW 2x30ea Cw, Ccw 2# ball in 90 deg flex  And 90 deg abduction                            Manual/Modalities      PROM 5' IR     [facility] Joint Mobs 5' Posterior, lateral and long-axis traction; Grade III/IV     Therapeutic Exercise and NMR EXR  [x] (66185) Provided verbal/tactile cueing for activities related to strengthening, flexibility, endurance, ROM  for improvements in scapular, scapulothoracic and UE control with self care, reaching, carrying, lifting, house/yardwork, driving/computer work.    [] (82368) Provided verbal/tactile cueing for activities related to improving balance, coordination, kinesthetic sense, posture, motor skill, proprioception  to assist with  scapular, scapulothoracic and UE control with self care, reaching, carrying, lifting, house/yardwork, driving/computer work. Therapeutic Activities:    [x] (73996 or 66461) Provided verbal/tactile cueing for activities related to improving balance, coordination, kinesthetic sense, posture, motor skill, proprioception and motor activation to allow for proper function of scapular, scapulothoracic and UE control with self care, carrying, lifting, driving/computer work. Home Exercise Program:    [x] (73610) Reviewed/Progressed HEP activities related to strengthening, flexibility, endurance, ROM of scapular, scapulothoracic and UE control with self care, reaching, carrying, lifting, house/yardwork, driving/computer work     Written HEP est    Access Code: [email or web address]  URL: [email or web address]  Date: 09/22/2022  Prepared by:  Sundar Solorio    Exercises  Cross Body Shoulder Stretch at [address] - 3 x daily - 7 x weekly - 5 reps - 30 hold  Sleeper Stretch - 3 x daily - 7 x weekly - 5 sets - 30\" hold  Standing Shoulder Internal Rotation Stretch with Towel - 3 x daily - 7 x weekly - 5 reps - 30 hold  Supine Shoulder Internal Rotation Stretch, Hand Behind Back - 3 x daily - 7 x weekly - 5 reps - 10-30 hold  Doorway Pec Stretch at 90 Degrees Abduction - 2 x daily - 7 x weekly - 3 sets - 30\" hold  Shoulder Flexion Wall Slide with Towel - 2 x daily - 7 x weekly - 10 reps - 10 hold  Scaption Wall Slide with Towel - 2 x daily - 7 x weekly - 10 sets - 10\" hold  Supine Shoulder Flexion Extension AAROM with Dowel - 2 x daily - 7 x weekly - 10 reps - 10 hold  Sidelying Shoulder External Rotation - 1 x daily - 2-3 x weekly - 3 sets - 10 reps  Prone Shoulder Horizontal Abduction with External Rotation - 1 x daily - 2-3 x weekly - 3 sets - 10 reps  Standing Shoulder Internal Rotation with Anchored Resistance - 1 x daily - 2-3 x weekly - 3 sets - 10 reps  Standing Wall [employer] with Mini Swiss Ball - 1 x daily - 2-3 x weekly - 3 sets - 10 reps  Standing Wall Ball Circles in Scaption with Mini Swiss Ball - 1 x daily - 2-3 x weekly - 3 sets - 10 reps  Wall Push Up with Plus - 1 x daily - 2-3 x weekly - 3 sets - 10 reps  Standing Shoulder External Rotation at 90 Degrees Abduction with Dumbbell - 1 x daily - 2-3 x weekly - 3 sets - 10 reps  Supine Shoulder Flexion Extension Full Range AROM - 1 x daily - 7 x weekly - 3 to 5 min  Sidelying Shoulder Abduction Palm Forward - 1 x daily - 7 x weekly - 3 sets - 10 reps          [x] (31487) Reviewed/Progressed HEP activities related to improving balance, coordination, kinesthetic sense, posture, motor skill, proprioception of scapular, scapulothoracic and UE control with self care, reaching, carrying, lifting, house/yardwork, driving/computer work      Manual Treatments:  PROM / STM / Oscillations-Mobs:  G-I, II, III, IV (PA's, Inf., Post.)  [x] (73715) Provided manual therapy to mobilize soft tissue/joints of cervical/CT, scapular GHJ and UE for the purpose of modulating pain, promoting relaxation,  increasing ROM, reducing/eliminating soft tissue swelling/inflammation/restriction, improving soft tissue extensibility and allowing for proper ROM for normal function with self care, reaching, carrying, lifting, house/yardwork, driving/computer work      Modalities:  Declined     [] GAME READY (VASO)- for significant edema, swelling, pain control.        Charges:  Timed Code Treatment Minutes: 64'   Total Treatment Minutes: 64'   [id number]    [] EVAL (LOW) 63600 (typically 20 minutes face-to-face)  [] EVAL (MOD) 09775 (typically 30 minutes face-to-face)  [] EVAL (HIGH) 61295 (typically 45 minutes face-to-face)  [] RE-EVAL   [x] EG(20956) x 1  [] IONTO  [x] NMR (88274) x  1  [] VASO  [x] Manual (81016) x 1    [] Other:  [x] TA (46921) x 1   [] Mech Traction (59413)  [] ES(attended) (43096)      [] ES (un) (98794):     GOALS:  Patient stated goal: regain full strength and function in R shoulder  [x] Progressing: [] Met: [] Not Met: [] Adjusted    Therapist goals for Patient:   Short Term Goals: To be achieved in: 2 weeks  1. Independent in HEP and progression per patient tolerance, in order to prevent re-injury. [] Progressing: [x] Met: [] Not Met: [] Adjusted  2. Patient will have a decrease in pain to facilitate improvement in movement, function, and ADLs as indicated by Functional Deficits. [] Progressing: [x] Met: [] Not Met: [] Adjusted    Long Term Goals: To be achieved in: 12 weeks  1. Disability index score of 67 or more for the FOTO to assist with reaching prior level of function. [] Progressing: [x] Met: [] Not Met: [] Adjusted  2. Patient will demonstrate increased AROM to 170+ elevation, 90 ER, and reach mid back to allow for proper joint functioning as indicated by patients Functional Deficits. [x] Progressing: [] Met: [] Not Met: [] Adjusted  3. Patient will demonstrate an increase in Strength to 5/5 to allow for proper functional mobility as indicated by patients Functional Deficits. [x] Progressing: [] Met: [] Not Met: [] Adjusted  4. Patient will return to 90% functional activities without increased symptoms or restriction. [x] Progressing: [] Met: [] Not Met: [] Adjusted  5.  Pt will be able to return to playing siddhartha ball w/o restrictions.(patient specific functional goal)    [x] Progressing: [] Met: [] Not Met: [] Adjusted    Overall Progression Towards Functional goals/ Treatment Progress Update:  [x] Patient is progressing as expected towards functional goals listed. [x] Progression is slowed due to complexities/Impairments listed. - joint stiffness, possible RC dysfunction  [] Progression has been slowed due to co-morbidities. [] Plan just implemented, too soon to assess goals progression <30days   [] Goals require adjustment due to lack of progress  [] Patient is not progressing as expected and requires additional follow up with physician  [] Other    ASSESSMENT:      Treatment/Activity Tolerance:  [x] Patient tolerated treatment well [] Patient limited by fatique  [] Patient limited by pain  [] Patient limited by other medical complications  [x] Other: increased weight in side lying abduciton to 2# today without any shrugging notice but moderate fatigue. Form breakdown on BoW exercise on final set of 30 in abduction position. . Biggest limitation remains IR stiffness and rotator cuff deficiency with shrug sign present with against gravity forward raising. Slow improvement in IR motion. Continue PT to restore full PROM into shoulder IR and full pain-free AROM to right shoulder as well as full RC strength in order to return patient safely to PLOF. Prognosis: [x] Good [] Fair  [] Poor    Patient Requires Follow-up: [x] Yes  [] No    PLAN: 2x/week for 4 weeks 9/22/22  [x] Continue per plan of care [] Alter current plan (see comments)  [] Plan of care initiated [] Hold pending MD visit [] Discharge      Electronically signed by: Maty George, PT, DPT, OCS    Note: If patient does not return for scheduled/ recommended follow up visits, this note will serve as a discharge from care along with most recent update on progress.

## 2022-10-10 ENCOUNTER — HOSPITAL ENCOUNTER (OUTPATIENT)
Dept: PHYSICAL THERAPY | Age: 57
Setting detail: THERAPIES SERIES
Discharge: HOME | End: 2022-10-10
Payer: COMMERCIAL

## 2022-10-10 PROCEDURE — 97112 NEUROMUSCULAR REEDUCATION: CPT

## 2022-10-10 PROCEDURE — 97140 MANUAL THERAPY 1/> REGIONS: CPT

## 2022-10-10 PROCEDURE — 97530 THERAPEUTIC ACTIVITIES: CPT

## 2022-10-10 PROCEDURE — 97110 THERAPEUTIC EXERCISES: CPT

## 2022-10-10 NOTE — FLOWSHEET NOTE
[address] [facility], [place]           Physical Therapy Daily Treatment Note      Date:  10/10/2022    Patient Name:  Antwan Reilly    :  1965  MRN: [id number]  Restrictions/Precautions: prox humeral fx 22  Medical/Treatment Diagnosis Information:  Diagnosis: X63.144N (ICD-10-CM) - Closed fracture of proximal end of right humerus, unspecified fracture morphology, initial encounter; Z98.890, Z87.81 (ICD-10-CM) - S/P ORIF (open reduction internal fixation) fracture      DOS: 22       OPEN REDUCTION INTERNAL FIXATION PROXIMAL HUMERUS, RIGHT   Treating Diagnosis: dec ROM in R UE, weakness in R UE, pain in R shoulder     Insurance/Certification information:  PT Insurance Information: [facility]  Physician Information:   Dr. Merline Curls  Has the plan of care been signed (Y/N):        [x]  Yes  []  No     Date of Patient follow up with Physician: 10/25/22      Is this a Progress Report:     []  Yes  [x]  No        If Yes:  Date Range for reporting period:  Beginnin22  Ending:     Progress report will be due (10 Rx or 30 days whichever is less):       Recertification will be due (POC Duration  / 90 days whichever is less): 10/22/22        Visit # Insurance Allowable Auth Required   32 60 []  Yes [x]  No        Functional Scale:    OUTCOME MEASURE DATE SCORE   FOTO Shoulder 22 37   FOTO Shoulder 22 55   FOTO Shoulder 22 64   FOTO Shoulder 22 65   FOTO Shoulder 22 70   FOTO Shoulder 22 70   FOTO Shoulder 22 76   FOTO Shoulder 22 71           Latex Allergy:  [x]NO      []YES  Preferred Language for Healthcare:   [x]English       []other:     Pain level:  0/10     SUBJECTIVE:  5 months PO. Doing well today. No pain after last visit and no new concerns. Shoulder did fine over the weekend.      OBJECTIVE:     22  ROM PROM AROM  Comment     L R      L R    Flexion   160° with OH cane 150° 130° with shrug     Abduction  125° 165° 120° with shrug     ER @ 90 87 80°cold T4 C7     IR (GH) @ 90 45 26°  35° full IR T6 L2        9/22/22  Strength L R Comment   Flexion 5 5    Abduction 5 4+    ER 5 5     IR 5 5    Supraspinatus 5 4+    Champagne Toast   5     Lower Trap         Mid Trap         Rhomboids         Biceps         Triceps         Horizontal Abduction         Horizontal Adduction         Lats             Occupation/School: ; [employer];  Living Status/Prior Level of Function: Independent with ADLs and IADLs, unlimited prior to injury in R shoulder;    RESTRICTIONS/PRECAUTIONS: Prox humeral fracture on 5/3/22 w surg on 5/6/22- Aggressive PROM; begin strengthening ~6/29/22  Exercises/Interventions:   Exercise/Equipment Resistance/Repetitions Comments   Cardio/Warm-up     Bike          Stretching/PROM     HEP      IR behind back  3x30\" Belt up the back At wall to block shoulder  from tipping forward   Sleeper Stretch 3x30\"    Cross Body Adduction 3x30\" supine      Wall slides 10x10\" Flexion, scaption       Pec Stretch Doorway 3x30\"          Wrist flex/ext           STRENGTH     Isometrics     Retraction          Weight shift     HEP   HEP         Biceps     Triceps          PRE's           UK Flexion 5' 0# 30 deg incline at table   Abduction 3x10; 2# Pain free AROM sidelying      Internal Rotation     I     EXT        Reverse Flys        Prone T's 3x10, Bilateral, off EOB; 1# 10\" hold on last rep of each set   Prone row + ER           Serratus foam roll up wall 2x10 with green loop around wrists       Triceps                Cable Column/Theraband     External Rotation 3x5 Red at 90/90    Internal Rotation 10x10\" Blue at 90/90    Abduction at wall 2x10x5\" holds;  Blue  Shoulder blade squeeze, lift arms to about 45 deg abduction and hold   Lats     HEP   Flex     HEP   BIC     HEP   PNF          Neuromuscular Re-ed     Dynamic Stability                                    Manual/Modalities      PROM 5' IR [facility] Joint Mobs 5' Posterior, lateral and long-axis traction; Grade III/IV     Therapeutic Exercise and NMR EXR  [x] (24318) Provided verbal/tactile cueing for activities related to strengthening, flexibility, endurance, ROM  for improvements in scapular, scapulothoracic and UE control with self care, reaching, carrying, lifting, house/yardwork, driving/computer work.    [] (24519) Provided verbal/tactile cueing for activities related to improving balance, coordination, kinesthetic sense, posture, motor skill, proprioception  to assist with  scapular, scapulothoracic and UE control with self care, reaching, carrying, lifting, house/yardwork, driving/computer work. Therapeutic Activities:    [x] (53713 or 52062) Provided verbal/tactile cueing for activities related to improving balance, coordination, kinesthetic sense, posture, motor skill, proprioception and motor activation to allow for proper function of scapular, scapulothoracic and UE control with self care, carrying, lifting, driving/computer work. Home Exercise Program:    [x] (44836) Reviewed/Progressed HEP activities related to strengthening, flexibility, endurance, ROM of scapular, scapulothoracic and UE control with self care, reaching, carrying, lifting, house/yardwork, driving/computer work     Written HEP est    Access Code: [email or web address]  URL: [email or web address]  Date: 09/22/2022  Prepared by:  Sundar Solorio    Exercises  Cross Body Shoulder Stretch at [address] - 3 x daily - 7 x weekly - 5 reps - 30 hold  Sleeper Stretch - 3 x daily - 7 x weekly - 5 sets - 30\" hold  Standing Shoulder Internal Rotation Stretch with Towel - 3 x daily - 7 x weekly - 5 reps - 30 hold  Supine Shoulder Internal Rotation Stretch, Hand Behind Back - 3 x daily - 7 x weekly - 5 reps - 10-30 hold  Doorway Pec Stretch at 90 Degrees Abduction - 2 x daily - 7 x weekly - 3 sets - 30\" hold  Shoulder Flexion Wall Slide with Towel - 2 x daily - 7 x weekly - 10 reps - 10 hold  Scaption Wall Slide with Towel - 2 x daily - 7 x weekly - 10 sets - 10\" hold  Supine Shoulder Flexion Extension AAROM with Dowel - 2 x daily - 7 x weekly - 10 reps - 10 hold  Sidelying Shoulder External Rotation - 1 x daily - 2-3 x weekly - 3 sets - 10 reps  Prone Shoulder Horizontal Abduction with External Rotation - 1 x daily - 2-3 x weekly - 3 sets - 10 reps  Standing Shoulder Internal Rotation with Anchored Resistance - 1 x daily - 2-3 x weekly - 3 sets - 10 reps  Standing Wall [employer] with Mini Swiss Ball - 1 x daily - 2-3 x weekly - 3 sets - 10 reps  Standing Wall Ball Circles in Scaption with Mini Swiss Ball - 1 x daily - 2-3 x weekly - 3 sets - 10 reps  Wall Push Up with Plus - 1 x daily - 2-3 x weekly - 3 sets - 10 reps  Standing Shoulder External Rotation at 90 Degrees Abduction with Dumbbell - 1 x daily - 2-3 x weekly - 3 sets - 10 reps  Supine Shoulder Flexion Extension Full Range AROM - 1 x daily - 7 x weekly - 3 to 5 min  Sidelying Shoulder Abduction Palm Forward - 1 x daily - 7 x weekly - 3 sets - 10 reps          [x] (73500) Reviewed/Progressed HEP activities related to improving balance, coordination, kinesthetic sense, posture, motor skill, proprioception of scapular, scapulothoracic and UE control with self care, reaching, carrying, lifting, house/yardwork, driving/computer work      Manual Treatments:  PROM / STM / Oscillations-Mobs:  G-I, II, III, IV (PA's, Inf., Post.)  [x] (80276) Provided manual therapy to mobilize soft tissue/joints of cervical/CT, scapular GHJ and UE for the purpose of modulating pain, promoting relaxation,  increasing ROM, reducing/eliminating soft tissue swelling/inflammation/restriction, improving soft tissue extensibility and allowing for proper ROM for normal function with self care, reaching, carrying, lifting, house/yardwork, driving/computer work      Modalities:  CP 15'     [] GAME READY (VASO)- for significant edema, swelling, pain control. Charges:  Timed Code Treatment Minutes: 61'   Total Treatment Minutes: 75'   830-945    [] EVAL (LOW) 455 1011 (typically 20 minutes face-to-face)  [] EVAL (MOD) 90747 (typically 30 minutes face-to-face)  [] EVAL (HIGH) 35577 (typically 45 minutes face-to-face)  [] RE-EVAL   [x] HC(94576) x 1  [] IONTO  [x] NMR (37489) x  1  [] VASO  [x] Manual (41358) x 1    [] Other:  [x] TA (42050) x 1   [] Mech Traction (07745)  [] ES(attended) (67169)      [] ES (un) (46218):     GOALS:  Patient stated goal: regain full strength and function in R shoulder  [x] Progressing: [] Met: [] Not Met: [] Adjusted    Therapist goals for Patient:   Short Term Goals: To be achieved in: 2 weeks  1. Independent in HEP and progression per patient tolerance, in order to prevent re-injury. [] Progressing: [x] Met: [] Not Met: [] Adjusted  2. Patient will have a decrease in pain to facilitate improvement in movement, function, and ADLs as indicated by Functional Deficits. [] Progressing: [x] Met: [] Not Met: [] Adjusted    Long Term Goals: To be achieved in: 12 weeks  1. Disability index score of 67 or more for the FOTO to assist with reaching prior level of function. [] Progressing: [x] Met: [] Not Met: [] Adjusted  2. Patient will demonstrate increased AROM to 170+ elevation, 90 ER, and reach mid back to allow for proper joint functioning as indicated by patients Functional Deficits. [x] Progressing: [] Met: [] Not Met: [] Adjusted  3. Patient will demonstrate an increase in Strength to 5/5 to allow for proper functional mobility as indicated by patients Functional Deficits. [x] Progressing: [] Met: [] Not Met: [] Adjusted  4. Patient will return to 90% functional activities without increased symptoms or restriction. [x] Progressing: [] Met: [] Not Met: [] Adjusted  5.  Pt will be able to return to playing siddhartha ball w/o restrictions.(patient specific functional goal)    [x] Progressing: [] Met: [] Not Met: [] Adjusted    Overall Progression Towards Functional goals/ Treatment Progress Update:  [x] Patient is progressing as expected towards functional goals listed. [x] Progression is slowed due to complexities/Impairments listed. - joint stiffness, possible RC dysfunction  [] Progression has been slowed due to co-morbidities. [] Plan just implemented, too soon to assess goals progression <30days   [] Goals require adjustment due to lack of progress  [] Patient is not progressing as expected and requires additional follow up with physician  [] Other    ASSESSMENT:      Treatment/Activity Tolerance:  [x] Patient tolerated treatment well [] Patient limited by fatique  [] Patient limited by pain  [] Patient limited by other medical complications  [x] Other: Shoulder IR does seem to be slowly improving. Limited by posterior capsular tightness. [place] feels stretch Anteriorly until AP pressure applied by PT to [place] which then focuses stretch posteriorly in shoulder. Challenged by addition of serratus foam roll ups on wall today but no pain noted. Up to 5' on K flexion at 30 deg incline but does start shrugging with increased fatigue. Continue PT to restore full PROM into shoulder IR and full pain-free AROM to right shoulder as well as full RC strength in order to return patient safely to PLOF. Prognosis: [x] Good [] Fair  [] Poor    Patient Requires Follow-up: [x] Yes  [] No    PLAN: 2x/week for 4 weeks 9/22/22  [x] Continue per plan of care [] Alter current plan (see comments)  [] Plan of care initiated [] Hold pending MD visit [] Discharge      Electronically signed by: Esther Maynard, PT, DPT, OCS    Note: If patient does not return for scheduled/ recommended follow up visits, this note will serve as a discharge from care along with most recent update on progress.

## 2022-10-14 ENCOUNTER — HOSPITAL ENCOUNTER (OUTPATIENT)
Dept: PHYSICAL THERAPY | Age: 57
Setting detail: THERAPIES SERIES
Discharge: HOME | End: 2022-10-14
Payer: COMMERCIAL

## 2022-10-14 PROCEDURE — 97140 MANUAL THERAPY 1/> REGIONS: CPT

## 2022-10-14 PROCEDURE — 97110 THERAPEUTIC EXERCISES: CPT

## 2022-10-14 PROCEDURE — 97112 NEUROMUSCULAR REEDUCATION: CPT

## 2022-10-14 NOTE — FLOWSHEET NOTE
[address] [facility], [place]           Physical Therapy Daily Treatment Note      Date:  10/14/2022    Patient Name:  Shakila Reyes    :  1965  MRN: [id number]  Restrictions/Precautions: prox humeral fx 22  Medical/Treatment Diagnosis Information:  Diagnosis: U05.183P (ICD-10-CM) - Closed fracture of proximal end of right humerus, unspecified fracture morphology, initial encounter; Z98.890, Z87.81 (ICD-10-CM) - S/P ORIF (open reduction internal fixation) fracture      DOS: 22       OPEN REDUCTION INTERNAL FIXATION PROXIMAL HUMERUS, RIGHT   Treating Diagnosis: dec ROM in R UE, weakness in R UE, pain in R shoulder     Insurance/Certification information:  PT Insurance Information: [facility]  Physician Information:   Dr. Alanis Alonso  Has the plan of care been signed (Y/N):        [x]  Yes  []  No     Date of Patient follow up with Physician: 10/25/22      Is this a Progress Report:     []  Yes  [x]  No        If Yes:  Date Range for reporting period:  Beginnin22  Ending:     Progress report will be due (10 Rx or 30 days whichever is less): 81      Recertification will be due (POC Duration  / 90 days whichever is less): 10/22/22        Visit # Insurance Allowable Auth Required   33 60 []  Yes [x]  No        Functional Scale:    OUTCOME MEASURE DATE SCORE   FOTO Shoulder 22 37   FOTO Shoulder 22 55   FOTO Shoulder 22 64   FOTO Shoulder 22 65   FOTO Shoulder 22 70   FOTO Shoulder 22 70   FOTO Shoulder 22 76   FOTO Shoulder 22 71           Latex Allergy:  [x]NO      []YES  Preferred Language for Healthcare:   [x]English       []other:     Pain level:  0/10     SUBJECTIVE:  5 months PO. Doing well today. A bit sore Wednesday after doing a lot of stretches / PT program but resolved by next day.       OBJECTIVE:     22  ROM PROM AROM  Comment     L R      L R    Flexion   160° with OH cane 150° 130° with shrug Abduction  125° 165° 120° with shrug     ER @ 90 87 80°cold T4 C7     IR (GH) @ 90 45 26°  35° full IR T6 L2        9/22/22  Strength L R Comment   Flexion 5 5    Abduction 5 4+    ER 5 5     IR 5 5    Supraspinatus 5 4+    Champagne Toast   5     Lower Trap         Mid Trap         Rhomboids         Biceps         Triceps         Horizontal Abduction         Horizontal Adduction         Lats             Occupation/School: ; [employer];  Living Status/Prior Level of Function: Independent with ADLs and IADLs, unlimited prior to injury in R shoulder;    RESTRICTIONS/PRECAUTIONS: Prox humeral fracture on 5/3/22 w surg on 5/6/22- Aggressive PROM; begin strengthening ~6/29/22  Exercises/Interventions:   Exercise/Equipment Resistance/Repetitions Comments   Cardio/Warm-up     Bike          Stretching/PROM     HEP      IR behind back  3x30\" Belt up the back At wall to block shoulder  from tipping forward   Sleeper Stretch 3x30\"    Cross Body Adduction 3x30\" supine      Wall slides 10x10\" Flexion, scaption       Pec Stretch Doorway 3x30\"          Wrist flex/ext           STRENGTH     Isometrics     Retraction          Weight shift     HEP   HEP         Biceps     Triceps          PRE's           UK Flexion 3' 0# 60 deg incline at table   Abduction 3x10; 2# Pain free AROM sidelying   External Rotation 3x10, 2#    Internal Rotation     I     EXT        Reverse Flys        Prone row + ER           Serratus foam roll up wall 2x10 with green loop around wrists       Triceps                Cable Column/Theraband     External Rotation 3x5 Red at 90/90    Internal Rotation 10x10\" Black at 90/90    Abduction at wall 2x10x5\" holds;  Blue  Shoulder blade squeeze, lift arms to about 45 deg abduction and hold   Lats     HEP   Flex     HEP   BIC     HEP   PNF          Neuromuscular Re-ed     Dynamic Stability                                    Manual/Modalities      PROM 5' IR     [address] [facility] Joint Mobs 5' Posterior, lateral and long-axis traction; Grade III/IV     Therapeutic Exercise and NMR EXR  [x] (65103) Provided verbal/tactile cueing for activities related to strengthening, flexibility, endurance, ROM  for improvements in scapular, scapulothoracic and UE control with self care, reaching, carrying, lifting, house/yardwork, driving/computer work.    [] (05163) Provided verbal/tactile cueing for activities related to improving balance, coordination, kinesthetic sense, posture, motor skill, proprioception  to assist with  scapular, scapulothoracic and UE control with self care, reaching, carrying, lifting, house/yardwork, driving/computer work. Therapeutic Activities:    [x] (04653 or 18307) Provided verbal/tactile cueing for activities related to improving balance, coordination, kinesthetic sense, posture, motor skill, proprioception and motor activation to allow for proper function of scapular, scapulothoracic and UE control with self care, carrying, lifting, driving/computer work. Home Exercise Program:    [x] (89342) Reviewed/Progressed HEP activities related to strengthening, flexibility, endurance, ROM of scapular, scapulothoracic and UE control with self care, reaching, carrying, lifting, house/yardwork, driving/computer work     Written HEP est    Access Code: [email or web address]  URL: [email or web address]  Date: 09/22/2022  Prepared by:  Sundar Solorio    Exercises  Cross Body Shoulder Stretch at [address] - 3 x daily - 7 x weekly - 5 reps - 30 hold  Sleeper Stretch - 3 x daily - 7 x weekly - 5 sets - 30\" hold  Standing Shoulder Internal Rotation Stretch with Towel - 3 x daily - 7 x weekly - 5 reps - 30 hold  Supine Shoulder Internal Rotation Stretch, Hand Behind Back - 3 x daily - 7 x weekly - 5 reps - 10-30 hold  Doorway Pec Stretch at 90 Degrees Abduction - 2 x daily - 7 x weekly - 3 sets - 30\" hold  Shoulder Flexion Wall Slide with Towel - 2 x daily - 7 x weekly - 10 reps - 10 hold  Scaption Wall Slide with Towel - 2 x daily - 7 x weekly - 10 sets - 10\" hold  Supine Shoulder Flexion Extension AAROM with Dowel - 2 x daily - 7 x weekly - 10 reps - 10 hold  Sidelying Shoulder External Rotation - 1 x daily - 2-3 x weekly - 3 sets - 10 reps  Prone Shoulder Horizontal Abduction with External Rotation - 1 x daily - 2-3 x weekly - 3 sets - 10 reps  Standing Shoulder Internal Rotation with Anchored Resistance - 1 x daily - 2-3 x weekly - 3 sets - 10 reps  Standing Wall [employer] with Mini Swiss Ball - 1 x daily - 2-3 x weekly - 3 sets - 10 reps  Standing Wall Ball Circles in Scaption with Mini Swiss Ball - 1 x daily - 2-3 x weekly - 3 sets - 10 reps  Wall Push Up with Plus - 1 x daily - 2-3 x weekly - 3 sets - 10 reps  Standing Shoulder External Rotation at 90 Degrees Abduction with Dumbbell - 1 x daily - 2-3 x weekly - 3 sets - 10 reps  Supine Shoulder Flexion Extension Full Range AROM - 1 x daily - 7 x weekly - 3 to 5 min  Sidelying Shoulder Abduction Palm Forward - 1 x daily - 7 x weekly - 3 sets - 10 reps          [x] (20501) Reviewed/Progressed HEP activities related to improving balance, coordination, kinesthetic sense, posture, motor skill, proprioception of scapular, scapulothoracic and UE control with self care, reaching, carrying, lifting, house/yardwork, driving/computer work      Manual Treatments:  PROM / STM / Oscillations-Mobs:  G-I, II, III, IV (PA's, Inf., Post.)  [x] (37349) Provided manual therapy to mobilize soft tissue/joints of cervical/CT, scapular GHJ and UE for the purpose of modulating pain, promoting relaxation,  increasing ROM, reducing/eliminating soft tissue swelling/inflammation/restriction, improving soft tissue extensibility and allowing for proper ROM for normal function with self care, reaching, carrying, lifting, house/yardwork, driving/computer work      Modalities:  CP 15'     [] GAME READY (VASO)- for significant edema, swelling, pain control.        Charges:  Timed Code Treatment Minutes: 39' Total Treatment Minutes: 60'   845-945    [] EVAL (LOW) 22498 (typically 20 minutes face-to-face)  [] EVAL (MOD) 38954 (typically 30 minutes face-to-face)  [] EVAL (HIGH) 39790 (typically 45 minutes face-to-face)  [] RE-EVAL   [x] FS(82777) x 1  [] IONTO  [x] NMR (52006) x  1  [] VASO  [x] Manual (19011) x 1    [] Other:  [] TA (03087) x    [] Mech Traction (90975)  [] ES(attended) (23409)      [] ES (un) (65131):     GOALS:  Patient stated goal: regain full strength and function in R shoulder  [x] Progressing: [] Met: [] Not Met: [] Adjusted    Therapist goals for Patient:   Short Term Goals: To be achieved in: 2 weeks  1. Independent in HEP and progression per patient tolerance, in order to prevent re-injury. [] Progressing: [x] Met: [] Not Met: [] Adjusted  2. Patient will have a decrease in pain to facilitate improvement in movement, function, and ADLs as indicated by Functional Deficits. [] Progressing: [x] Met: [] Not Met: [] Adjusted    Long Term Goals: To be achieved in: 12 weeks  1. Disability index score of 67 or more for the FOTO to assist with reaching prior level of function. [] Progressing: [x] Met: [] Not Met: [] Adjusted  2. Patient will demonstrate increased AROM to 170+ elevation, 90 ER, and reach mid back to allow for proper joint functioning as indicated by patients Functional Deficits. [x] Progressing: [] Met: [] Not Met: [] Adjusted  3. Patient will demonstrate an increase in Strength to 5/5 to allow for proper functional mobility as indicated by patients Functional Deficits. [x] Progressing: [] Met: [] Not Met: [] Adjusted  4. Patient will return to 90% functional activities without increased symptoms or restriction. [x] Progressing: [] Met: [] Not Met: [] Adjusted  5.  Pt will be able to return to playing siddhartha ball w/o restrictions.(patient specific functional goal)    [x] Progressing: [] Met: [] Not Met: [] Adjusted    Overall Progression Towards Functional goals/ Treatment Progress Update:  [x] Patient is progressing as expected towards functional goals listed. [x] Progression is slowed due to complexities/Impairments listed. - joint stiffness, possible RC dysfunction  [] Progression has been slowed due to co-morbidities. [] Plan just implemented, too soon to assess goals progression <30days   [] Goals require adjustment due to lack of progress  [] Patient is not progressing as expected and requires additional follow up with physician  [] Other    ASSESSMENT:      Treatment/Activity Tolerance:  [x] Patient tolerated treatment well [] Patient limited by fatique  [] Patient limited by pain  [] Patient limited by other medical complications  [x] Other: Shoulder IR does seem to be slowly improving. Limited by posterior capsular tightness. Progressed UK flexion to 60 deg incline with fatigue after 3 min but no pain or shrug. Continue PT to restore full PROM into shoulder IR and full pain-free AROM to right shoulder as well as full RC strength in order to return patient safely to PLOF. Prognosis: [x] Good [] Fair  [] Poor    Patient Requires Follow-up: [x] Yes  [] No    PLAN: 2x/week for 4 weeks 9/22/22  [x] Continue per plan of care [] Alter current plan (see comments)  [] Plan of care initiated [] Hold pending MD visit [] Discharge      Electronically signed by: Olamide Rand, PT, DPT, OCS    Note: If patient does not return for scheduled/ recommended follow up visits, this note will serve as a discharge from care along with most recent update on progress.

## 2022-10-17 ENCOUNTER — HOSPITAL ENCOUNTER (OUTPATIENT)
Dept: PHYSICAL THERAPY | Age: 57
Setting detail: THERAPIES SERIES
Discharge: HOME | End: 2022-10-17
Payer: COMMERCIAL

## 2022-10-17 PROCEDURE — 97530 THERAPEUTIC ACTIVITIES: CPT

## 2022-10-17 PROCEDURE — 97110 THERAPEUTIC EXERCISES: CPT

## 2022-10-17 PROCEDURE — 97140 MANUAL THERAPY 1/> REGIONS: CPT

## 2022-10-17 PROCEDURE — 97112 NEUROMUSCULAR REEDUCATION: CPT

## 2022-10-17 NOTE — FLOWSHEET NOTE
[address] [facility], [place]           Physical Therapy Daily Treatment Note      Date:  10/17/2022    Patient Name:  Desiree Ashley    :  1965  MRN: [id number]  Restrictions/Precautions: prox humeral fx 22  Medical/Treatment Diagnosis Information:  Diagnosis: O78.375R (ICD-10-CM) - Closed fracture of proximal end of right humerus, unspecified fracture morphology, initial encounter; Z98.890, Z87.81 (ICD-10-CM) - S/P ORIF (open reduction internal fixation) fracture      DOS: 22       OPEN REDUCTION INTERNAL FIXATION PROXIMAL HUMERUS, RIGHT   Treating Diagnosis: dec ROM in R UE, weakness in R UE, pain in R shoulder     Insurance/Certification information:  PT Insurance Information: R  Physician Information:   Dr. Silvestre Ribeiro  Has the plan of care been signed (Y/N):        [x]  Yes  []  No     Date of Patient follow up with Physician: 10/25/22      Is this a Progress Report:     []  Yes  [x]  No        If Yes:  Date Range for reporting period:  Beginnin22  Ending:     Progress report will be due (10 Rx or 30 days whichever is less):       Recertification will be due (POC Duration  / 90 days whichever is less): 10/22/22        Visit # Insurance Allowable Auth Required   34 60 []  Yes [x]  No        Functional Scale:    OUTCOME MEASURE DATE SCORE   FOTO Shoulder 22 37   FOTO Shoulder 22 55   FOTO Shoulder 22 64   FOTO Shoulder 22 65   FOTO Shoulder 22 70   FOTO Shoulder 22 70   FOTO Shoulder 22 76   FOTO Shoulder 22 71           Latex Allergy:  [x]NO      []YES  Preferred Language for Healthcare:   [x]English       []other:     Pain level:  0/10     SUBJECTIVE:  5.5 months PO. Doing well today. A bit sore this morning he thinks from some stuff around the house yesterday. No new concerns.       OBJECTIVE:     22  ROM PROM AROM  Comment     L R      L R    Flexion   160° with OH cane 150° 130° with shrug Abduction  125° 165° 120° with shrug     ER @ 90 87 80°cold T4 C7     IR (GH) @ 90 45 26°  35° full IR T6 L2        9/22/22  Strength L R Comment   Flexion 5 5    Abduction 5 4+    ER 5 5     IR 5 5    Supraspinatus 5 4+    Champagne Toast   5     Lower Trap         Mid Trap         Rhomboids         Biceps         Triceps         Horizontal Abduction         Horizontal Adduction         Lats             Occupation/School: ; [employer];  Living Status/Prior Level of Function: Independent with ADLs and IADLs, unlimited prior to injury in R shoulder;    RESTRICTIONS/PRECAUTIONS: Prox humeral fracture on 5/3/22 w surg on 5/6/22- Aggressive PROM; begin strengthening ~6/29/22  Exercises/Interventions:   Exercise/Equipment Resistance/Repetitions Comments   Cardio/Warm-up     Bike          Stretching/PROM     HEP      IR behind back  3x30\" Belt up the back At wall to block shoulder  from tipping forward   Sleeper Stretch 3x30\"    Cross Body Adduction 3x30\" supine      Wall slides 10x10\" Flexion, scaption       Pec Stretch Doorway 3x30\"          Wrist flex/ext           STRENGTH     Isometrics     Retraction          Weight shift     HEP   HEP         Biceps     Triceps          PRE's           UK Flexion 3' 0# 60 deg incline at table   Abduction 3x10; 2# Pain free AROM sidelying   External Rotation 3x10, 3#    Internal Rotation     I     EXT        Reverse Flys        Prone row + ER           Serratus foam roll up wall 3x10 with green loop around wrists       Triceps                Cable Column/Theraband     External Rotation 3x5 Red at 90/90    Internal Rotation 10x10\" Silver at 90/90    Lats     HEP   Flex     HEP   BIC     HEP   PNF          Neuromuscular Re-ed     Dynamic Stability     Body blade IR/ER at  side; 3x20\"                                Manual/Modalities      PROM 5' IR     [address] Joint Mobs 5' Posterior, lateral and long-axis traction; Grade III/IV     Therapeutic Exercise and NMR EXR  [x] (97444) Provided verbal/tactile cueing for activities related to strengthening, flexibility, endurance, ROM  for improvements in scapular, scapulothoracic and UE control with self care, reaching, carrying, lifting, house/yardwork, driving/computer work.    [] (21129) Provided verbal/tactile cueing for activities related to improving balance, coordination, kinesthetic sense, posture, motor skill, proprioception  to assist with  scapular, scapulothoracic and UE control with self care, reaching, carrying, lifting, house/yardwork, driving/computer work. Therapeutic Activities:    [x] (82680 or 88468) Provided verbal/tactile cueing for activities related to improving balance, coordination, kinesthetic sense, posture, motor skill, proprioception and motor activation to allow for proper function of scapular, scapulothoracic and UE control with self care, carrying, lifting, driving/computer work. Home Exercise Program:    [x] (38250) Reviewed/Progressed HEP activities related to strengthening, flexibility, endurance, ROM of scapular, scapulothoracic and UE control with self care, reaching, carrying, lifting, house/yardwork, driving/computer work     Written HEP est    Access Code: [email or web address]  URL: [email or web address]  Date: 09/22/2022  Prepared by:  Sundar Solorio    Exercises  Cross Body Shoulder Stretch at [address] [place] - 3 x daily - 7 x weekly - 5 reps - 30 hold  Sleeper Stretch - 3 x daily - 7 x weekly - 5 sets - 30\" hold  Standing Shoulder Internal Rotation Stretch with Towel - 3 x daily - 7 x weekly - 5 reps - 30 hold  Supine Shoulder Internal Rotation Stretch, Hand Behind Back - 3 x daily - 7 x weekly - 5 reps - 10-30 hold  Doorway Pec Stretch at 90 Degrees Abduction - 2 x daily - 7 x weekly - 3 sets - 30\" hold  Shoulder Flexion Wall Slide with Towel - 2 x daily - 7 x weekly - 10 reps - 10 hold  Scaption Wall Slide with Towel - 2 x daily - 7 x weekly - 10 sets - 10\" hold  Supine Shoulder Flexion Extension AAROM with Dowel - 2 x daily - 7 x weekly - 10 reps - 10 hold  Sidelying Shoulder External Rotation - 1 x daily - 2-3 x weekly - 3 sets - 10 reps  Prone Shoulder Horizontal Abduction with External Rotation - 1 x daily - 2-3 x weekly - 3 sets - 10 reps  Standing Shoulder Internal Rotation with Anchored Resistance - 1 x daily - 2-3 x weekly - 3 sets - 10 reps  Standing Wall [employer] with Mini Swiss Ball - 1 x daily - 2-3 x weekly - 3 sets - 10 reps  Standing Wall Ball Circles in Scaption with Mini Swiss Ball - 1 x daily - 2-3 x weekly - 3 sets - 10 reps  Wall Push Up with Plus - 1 x daily - 2-3 x weekly - 3 sets - 10 reps  Standing Shoulder External Rotation at 90 Degrees Abduction with Dumbbell - 1 x daily - 2-3 x weekly - 3 sets - 10 reps  Supine Shoulder Flexion Extension Full Range AROM - 1 x daily - 7 x weekly - 3 to 5 min  Sidelying Shoulder Abduction Palm Forward - 1 x daily - 7 x weekly - 3 sets - 10 reps          [x] (56038) Reviewed/Progressed HEP activities related to improving balance, coordination, kinesthetic sense, posture, motor skill, proprioception of scapular, scapulothoracic and UE control with self care, reaching, carrying, lifting, house/yardwork, driving/computer work      Manual Treatments:  PROM / STM / Oscillations-Mobs:  G-I, II, III, IV (PA's, Inf., Post.)  [x] (46606) Provided manual therapy to mobilize soft tissue/joints of cervical/CT, scapular GHJ and UE for the purpose of modulating pain, promoting relaxation,  increasing ROM, reducing/eliminating soft tissue swelling/inflammation/restriction, improving soft tissue extensibility and allowing for proper ROM for normal function with self care, reaching, carrying, lifting, house/yardwork, driving/computer work      Modalities:  CP 15'     [] GAME READY (VASO)- for significant edema, swelling, pain control.        Charges:  Timed Code Treatment Minutes: 61'   Total Treatment Minutes: 75'   830-945    [] EVAL (LOW) 455 1011 (typically 20 minutes face-to-face)  [] EVAL (MOD) 26810 (typically 30 minutes face-to-face)  [] EVAL (HIGH) 40633 (typically 45 minutes face-to-face)  [] RE-EVAL   [x] IW(83279) x 1  [] IONTO  [x] NMR (63552) x  1  [] VASO  [x] Manual (70315) x 1    [] Other:  [x] TA (29454) x 1   [] Mech Traction (22913)  [] ES(attended) (82405)      [] ES (un) (59708):     GOALS:  Patient stated goal: regain full strength and function in R shoulder  [x] Progressing: [] Met: [] Not Met: [] Adjusted    Therapist goals for Patient:   Short Term Goals: To be achieved in: 2 weeks  1. Independent in HEP and progression per patient tolerance, in order to prevent re-injury. [] Progressing: [x] Met: [] Not Met: [] Adjusted  2. Patient will have a decrease in pain to facilitate improvement in movement, function, and ADLs as indicated by Functional Deficits. [] Progressing: [x] Met: [] Not Met: [] Adjusted    Long Term Goals: To be achieved in: 12 weeks  1. Disability index score of 67 or more for the FOTO to assist with reaching prior level of function. [] Progressing: [x] Met: [] Not Met: [] Adjusted  2. Patient will demonstrate increased AROM to 170+ elevation, 90 ER, and reach mid back to allow for proper joint functioning as indicated by patients Functional Deficits. [x] Progressing: [] Met: [] Not Met: [] Adjusted  3. Patient will demonstrate an increase in Strength to 5/5 to allow for proper functional mobility as indicated by patients Functional Deficits. [x] Progressing: [] Met: [] Not Met: [] Adjusted  4. Patient will return to 90% functional activities without increased symptoms or restriction. [x] Progressing: [] Met: [] Not Met: [] Adjusted  5.  Pt will be able to return to playing siddhartha ball w/o restrictions.(patient specific functional goal)    [x] Progressing: [] Met: [] Not Met: [] Adjusted    Overall Progression Towards Functional goals/ Treatment Progress Update:  [x] Patient is progressing as expected towards functional goals listed. [x] Progression is slowed due to complexities/Impairments listed. - joint stiffness, possible RC dysfunction  [] Progression has been slowed due to co-morbidities. [] Plan just implemented, too soon to assess goals progression <30days   [] Goals require adjustment due to lack of progress  [] Patient is not progressing as expected and requires additional follow up with physician  [] Other    ASSESSMENT:      Treatment/Activity Tolerance:  [x] Patient tolerated treatment well [] Patient limited by fatique  [] Patient limited by pain  [] Patient limited by other medical complications  [x] Other: Shoulder IR does seem to be slowly improving. Limited by posterior capsular tightness. Planning on progress note at [facility] to determine change in ROM specifically IR. Sees MD next week. Continue PT to restore full PROM into shoulder IR and full pain-free AROM to right shoulder as well as full RC strength in order to return patient safely to PLOF. Prognosis: [x] Good [] Fair  [] Poor    Patient Requires Follow-up: [x] Yes  [] No    PLAN: 2x/week for 4 weeks 9/22/22  [x] Continue per plan of care [] Alter current plan (see comments)  [] Plan of care initiated [] Hold pending MD visit [] Discharge      Electronically signed by: Hector Rendon, PT, DPT, OCS    Note: If patient does not return for scheduled/ recommended follow up visits, this note will serve as a discharge from care along with most recent update on progress.

## 2022-10-21 ENCOUNTER — HOSPITAL ENCOUNTER (OUTPATIENT)
Dept: PHYSICAL THERAPY | Age: 57
Setting detail: THERAPIES SERIES
Discharge: HOME | End: 2022-10-21
Payer: COMMERCIAL

## 2022-10-21 PROCEDURE — 97110 THERAPEUTIC EXERCISES: CPT

## 2022-10-21 PROCEDURE — 97140 MANUAL THERAPY 1/> REGIONS: CPT

## 2022-10-21 PROCEDURE — 97530 THERAPEUTIC ACTIVITIES: CPT

## 2022-10-21 NOTE — PLAN OF CARE
[facility] [address] [place] [address]        Physical Therapy Re-Certification Plan of Care    Dear  Dr. Fred Emanuel,    We had the pleasure of treating the following patient for physical therapy services at [address]. A summary of our findings can be found in the updated assessment below. This includes our plan of care. If you have any questions or concerns regarding these findings, please do not hesitate to contact me at [phone]. Thank you for the referral.     Physician Signature:________________________________Date:__________________  By signing above (or electronic signature), therapists plan is approved by physician      Overall Response to Treatment:   []Patient is responding well to treatment and improvement is noted with regards  to goals   []Patient should continue to improve in reasonable time if they continue HEP   [x]Patient has plateaued and is no longer responding to skilled PT intervention    []Patient is getting worse and would benefit from return to referring MD   []Patient unable to adhere to initial POC   [x]Other: Patient has not made any improvement in AROM flexion or abduction in past month despite continud intense physical therapy. He is limited to about 130 degrees active elevation with shrug sign present despite progression in rotator cuff strengthening and UK deltoid program. He tests 5/5 in shoulder flexion, abduction, IR and ER without pain. Passively his motion also has reached a plateau in all planes of motion including IR kept at 25 degrees for isolated GH and 35 degrees gross/total IR despite heavy focus on manual IR stretching for past month. Functional scores plateaued on FOTO Shoulder. He is independent in HEP and due to not making further progress in ROM it was dicussed possible transition to either GAP or HEP. He sees surgeon on Monday and will  return to PT next Friday to discuss next steps.     Date range of Visits: 22-10/21/22      Total Visits: 35      Physical Therapy Daily Treatment Note      Date:  10/21/2022    Patient Name:  Margo Niño    :  1965  MRN: [id number]  Restrictions/Precautions: prox humeral fx 22  Medical/Treatment Diagnosis Information:  Diagnosis: X68.117L (ICD-10-CM) - Closed fracture of proximal end of right humerus, unspecified fracture morphology, initial encounter; Z98.890, Z87.81 (ICD-10-CM) - S/P ORIF (open reduction internal fixation) fracture      DOS: 22       OPEN REDUCTION INTERNAL FIXATION PROXIMAL HUMERUS, RIGHT   Treating Diagnosis: dec ROM in R UE, weakness in R UE, pain in R shoulder     Insurance/Certification information:  PT Insurance Information: R  Physician Information:   Dr. Jatin Roy  Has the plan of care been signed (Y/N):        [x]  Yes  []  No     Date of Patient follow up with Physician: 10/25/22      Is this a Progress Report:     [x]  Yes  []  No        If Yes:  Date Range for reporting period:  Beginnin22  Ending: 10/21/22    Progress report will be due (10 Rx or 30 days whichever is less):       Recertification will be due (POC Duration  / 90 days whichever is less): 22        Visit # Insurance Allowable Auth Required   35 60 []  Yes [x]  No        Functional Scale:    OUTCOME MEASURE DATE SCORE   FOTO Shoulder 22 37   FOTO Shoulder 22 55   FOTO Shoulder 22 64   FOTO Shoulder 22 65   FOTO Shoulder 22 70   FOTO Shoulder 22 70   FOTO Shoulder 22 76   FOTO Shoulder 22 71   FOTO Shoulder 10/21/22 76           Latex Allergy:  [x]NO      []YES  Preferred Language for Healthcare:   [x]English       []other:     Pain level:  0/10     SUBJECTIVE:  5.5 months PO. Doing well today. Low back sore on Tuesday morning but does not think it is from PT. No new concerns or questions with shoulder.       OBJECTIVE:     10/21/22  ROM PROM AROM  Comment     L R    L R    Flexion   160° with OH cane 150° 130° with shrug  (unchanged from last month)     Abduction  125° 165° 120° with shrug  (unchanged from last month)     ER @ 90 87 80°cold T4 C7 (unchanged from last month)     IR (GH) @ 90 45 25°  35° full IR T6 L4 (was L2 last month)        10/21/22  Strength L R Comment   Flexion 5 5    Abduction 5 5    ER 5 5     IR 5 5    Supraspinatus 5 5    Champagne Toast   5     Lower Trap         Mid Trap         Rhomboids         Biceps         Triceps         Horizontal Abduction         Horizontal Adduction         Lats             Occupation/School: ; [employer] and walking;  Living Status/Prior Level of Function: Independent with ADLs and IADLs, unlimited prior to injury in R shoulder;    RESTRICTIONS/PRECAUTIONS: Prox humeral fracture on 5/3/22 w surg on 5/6/22- Aggressive PROM; begin strengthening ~6/29/22  Exercises/Interventions:   Exercise/Equipment Resistance/Repetitions Comments   Cardio/Warm-up     Bike          Stretching/PROM     HEP      IR behind back  3x30\" Belt up the back At wall to block shoulder  from tipping forward   Sleeper Stretch 3x30\"    Cross Body Adduction 3x30\" supine      Wall slides 10x10\" Flexion, scaption       Pec Stretch Doorway 3x30\"          Wrist flex/ext           STRENGTH     Isometrics     Retraction          Weight shift     HEP   HEP         Biceps     Triceps          PRE's           Internal Rotation     I     EXT        Reverse Flys        Prone row + ER                 Triceps                Cable Column/Theraband           Lats     HEP   Flex     HEP   BIC     HEP   PNF          Neuromuscular Re-ed     Dynamic Stability                                    Manual/Modalities      PROM 5' IR     [address] Joint Mobs 5' Posterior, lateral and long-axis traction; Grade III/IV     Therapeutic Exercise and NMR EXR  [x] (30891) Provided verbal/tactile cueing for activities related to strengthening, flexibility, endurance, ROM  for improvements in scapular, scapulothoracic and UE control with self care, reaching, carrying, lifting, house/yardwork, driving/computer work.    [] (37877) Provided verbal/tactile cueing for activities related to improving balance, coordination, kinesthetic sense, posture, motor skill, proprioception  to assist with  scapular, scapulothoracic and UE control with self care, reaching, carrying, lifting, house/yardwork, driving/computer work. Therapeutic Activities:    [x] (29736 or 20929) Provided verbal/tactile cueing for activities related to improving balance, coordination, kinesthetic sense, posture, motor skill, proprioception and motor activation to allow for proper function of scapular, scapulothoracic and UE control with self care, carrying, lifting, driving/computer work. Home Exercise Program:    [x] (25319) Reviewed/Progressed HEP activities related to strengthening, flexibility, endurance, ROM of scapular, scapulothoracic and UE control with self care, reaching, carrying, lifting, house/yardwork, driving/computer work     Written HEP est    Access Code: [email or web address]  URL: [email or web address]  Date: 09/22/2022  Prepared by:  Sundar Solorio    Exercises  Cross Body Shoulder Stretch at Roland Meally - 3 x daily - 7 x weekly - 5 reps - 30 hold  Sleeper Stretch - 3 x daily - 7 x weekly - 5 sets - 30\" hold  Standing Shoulder Internal Rotation Stretch with Towel - 3 x daily - 7 x weekly - 5 reps - 30 hold  Supine Shoulder Internal Rotation Stretch, Hand Behind Back - 3 x daily - 7 x weekly - 5 reps - 10-30 hold  Doorway Pec Stretch at 90 Degrees Abduction - 2 x daily - 7 x weekly - 3 sets - 30\" hold  Shoulder Flexion Wall Slide with Towel - 2 x daily - 7 x weekly - 10 reps - 10 hold  Scaption Wall Slide with Towel - 2 x daily - 7 x weekly - 10 sets - 10\" hold  Supine Shoulder Flexion Extension AAROM with Dowel - 2 x daily - 7 x weekly - 10 reps - 10 hold  Sidelying Shoulder External Rotation - 1 x daily - 2-3 x weekly - 3 sets - 10 reps  Prone Shoulder Horizontal Abduction with External Rotation - 1 x daily - 2-3 x weekly - 3 sets - 10 reps  Standing Shoulder Internal Rotation with Anchored Resistance - 1 x daily - 2-3 x weekly - 3 sets - 10 reps  Standing Wall [employer] with Mini Swiss Ball - 1 x daily - 2-3 x weekly - 3 sets - 10 reps  Standing Wall Ball Circles in Scaption with Mini Swiss Ball - 1 x daily - 2-3 x weekly - 3 sets - 10 reps  Wall Push Up with Plus - 1 x daily - 2-3 x weekly - 3 sets - 10 reps  Standing Shoulder External Rotation at 90 Degrees Abduction with Dumbbell - 1 x daily - 2-3 x weekly - 3 sets - 10 reps  Supine Shoulder Flexion Extension Full Range AROM - 1 x daily - 7 x weekly - 3 to 5 min  Sidelying Shoulder Abduction Palm Forward - 1 x daily - 7 x weekly - 3 sets - 10 reps          [x] (36609) Reviewed/Progressed HEP activities related to improving balance, coordination, kinesthetic sense, posture, motor skill, proprioception of scapular, scapulothoracic and UE control with self care, reaching, carrying, lifting, house/yardwork, driving/computer work      Manual Treatments:  PROM / STM / Oscillations-Mobs:  G-I, II, III, IV (PA's, Inf., Post.)  [x] (47025) Provided manual therapy to mobilize soft tissue/joints of cervical/CT, scapular GHJ and UE for the purpose of modulating pain, promoting relaxation,  increasing ROM, reducing/eliminating soft tissue swelling/inflammation/restriction, improving soft tissue extensibility and allowing for proper ROM for normal function with self care, reaching, carrying, lifting, house/yardwork, driving/computer work      Modalities: Declined     [] GAME READY (VASO)- for significant edema, swelling, pain control.        Charges:  Timed Code Treatment Minutes: 39'   Total Treatment Minutes: 39'   845-930    [] EVAL (LOW) 455 [id number] (typically 20 minutes face-to-face)  [] EVAL (MOD) 35113 (typically 30 minutes face-to-face)  [] EVAL (HIGH) 86087 (typically 45 minutes face-to-face)  [] RE-EVAL   [x] HW(13976) x 1  [] IONTO  [] NMR (04832) x    [] VASO  [x] Manual (17102) x 1    [] Other:  [x] TA (40263) x 1   [] Mech Traction (46363)  [] ES(attended) (71313)      [] ES (un) (00437):     GOALS:  Patient stated goal: regain full strength and function in R shoulder  [x] Progressing: [] Met: [] Not Met: [] Adjusted    Therapist goals for Patient:   Short Term Goals: To be achieved in: 2 weeks  1. Independent in HEP and progression per patient tolerance, in order to prevent re-injury. [] Progressing: [x] Met: [] Not Met: [] Adjusted  2. Patient will have a decrease in pain to facilitate improvement in movement, function, and ADLs as indicated by Functional Deficits. [] Progressing: [x] Met: [] Not Met: [] Adjusted    Long Term Goals: To be achieved in: 12 weeks  1. Disability index score of 67 or more for the FOTO to assist with reaching prior level of function. [] Progressing: [x] Met: [] Not Met: [] Adjusted  2. Patient will demonstrate increased AROM to 170+ elevation, 90 ER, and reach mid back to allow for proper joint functioning as indicated by patients Functional Deficits. [] Progressing: [] Met: [x] Not Met: [] Adjusted  3. Patient will demonstrate an increase in Strength to 5/5 to allow for proper functional mobility as indicated by patients Functional Deficits. [] Progressing: [x] Met: [] Not Met: [] Adjusted  4. Patient will return to 90% functional activities without increased symptoms or restriction. [] Progressing: [x] Met: [] Not Met: [] Adjusted  5. Pt will be able to return to playing siddhartha ball w/o restrictions.(patient specific functional goal)    [] Progressing: [] Met: [x] Not Met: [] Adjusted    Overall Progression Towards Functional goals/ Treatment Progress Update:  [] Patient is progressing as expected towards functional goals listed. [x] Progression is slowed due to complexities/Impairments listed.   - joint stiffness, possible RC dysfunction  [] Progression has been slowed due to co-morbidities. [] Plan just implemented, too soon to assess goals progression <30days   [] Goals require adjustment due to lack of progress  [] Patient is not progressing as expected and requires additional follow up with physician  [] Other    ASSESSMENT:      Treatment/Activity Tolerance:  [x] Patient tolerated treatment well [] Patient limited by fatique  [] Patient limited by pain  [] Patient limited by other medical complications  [x] Other: See above. Prognosis: [x] Good [] Fair  [] Poor    Patient Requires Follow-up: [x] Yes  [] No    PLAN: Hold pending MD visit 10/21/22  [] Continue per plan of care [] Alter current plan (see comments)  [] Plan of care initiated [x] Hold pending MD visit [] Discharge      Electronically signed by: Murali Stubbs, PT, DPT, OCS    Note: If patient does not return for scheduled/ recommended follow up visits, this note will serve as a discharge from care along with most recent update on progress.

## 2022-10-24 ENCOUNTER — APPOINTMENT (OUTPATIENT)
Dept: PHYSICAL THERAPY | Age: 57
End: 2022-10-24
Payer: COMMERCIAL

## 2022-10-25 ENCOUNTER — OFFICE VISIT (OUTPATIENT)
Dept: ORTHOPEDIC SURGERY | Age: 57
End: 2022-10-25
Payer: COMMERCIAL

## 2022-10-25 VITALS — WEIGHT: 248 LBS | HEIGHT: 77 IN | BODY MASS INDEX: 29.28 KG/M2

## 2022-10-25 DIAGNOSIS — Z87.81 S/P ORIF (OPEN REDUCTION INTERNAL FIXATION) FRACTURE: Primary | ICD-10-CM

## 2022-10-25 DIAGNOSIS — Z98.890 S/P ORIF (OPEN REDUCTION INTERNAL FIXATION) FRACTURE: Primary | ICD-10-CM

## 2022-10-25 PROCEDURE — 99213 OFFICE O/P EST LOW 20 MIN: CPT | Performed by: ORTHOPAEDIC SURGERY

## 2022-10-25 NOTE — PROGRESS NOTES
[facility]  [id number]  October 25, 2022    Chief Complaint   Patient presents with    Follow-up     Post ORIF Rt Humerus  DOS 5/6/22       History: The patient is here for follow-up regarding his right shoulder. He is now 5 months status post open reduction and internal fixation of the right proximal humerus. He reports no pain in the right shoulder. He is making significant progress. He continues to note some stiffness. He continues to go to physical therapy. The patient's  past medical history, medications, allergies,  family history, social history, and have been reviewed, and dated and are recorded in the chart. Pertinent items are noted in HPI. Review of systems reviewed from Pertinent History Form dated on 5/13/22 and available in the patient's chart under the Media tab. Vitals:  Ht 6' 5\" (1.956 m)   Wt 248 lb (112.5 kg)   BMI 29.41 kg/m²     Physical: On examination today, the patient is alert and oriented x 3. The incision is clean and dry. There is no sign of infection. He is neurovascularly intact distally. He extends his right elbow to approximately 0 degrees short of full extension. He flexes the right elbow to 150 degrees. He flexes and extends the right wrist without difficulty. We abducted the right shoulder to approximately 165 degrees without much pain. He forward flexes the right shoulder to 165 degrees. We internally rotated the right shoulder to approximately L4. He externally rotates to approximately 50 degrees. External rotation of the left shoulder is approximately 80 degrees. X-rays: 3 views of the right shoulder obtained in the office last visit were extensively reviewed. The hardware is in good position. The fracture is well aligned. There has been no further displacement. There has been a significant amount of callus formation. The fracture is completely healed.     Impression: Status post open reduction and internal fixation of right proximal humerus fracture    Plan: At this time, we will continue our current treatment. The patient works in IT at [employer]. The patient may continue his home exercise program.  He will gradually resume regular activities. It is advisable that the patient see the physical therapist came every 2 weeks for the next 2 months.

## 2022-10-28 ENCOUNTER — HOSPITAL ENCOUNTER (OUTPATIENT)
Dept: PHYSICAL THERAPY | Age: 57
Setting detail: THERAPIES SERIES
Discharge: HOME | End: 2022-10-28
Payer: COMMERCIAL

## 2022-10-28 PROCEDURE — 97530 THERAPEUTIC ACTIVITIES: CPT

## 2022-10-28 PROCEDURE — 97110 THERAPEUTIC EXERCISES: CPT

## 2022-10-28 PROCEDURE — 97112 NEUROMUSCULAR REEDUCATION: CPT

## 2022-10-28 NOTE — FLOWSHEET NOTE
[address] [facility], [place]           Physical Therapy Daily Treatment Note      Date:  10/28/2022    Patient Name:  Shelia Snellen    :  1965  MRN: [id number]  Restrictions/Precautions: prox humeral fx 22  Medical/Treatment Diagnosis Information:  Diagnosis: S42.201A (ICD-10-CM) - Closed fracture of proximal end of right humerus, unspecified fracture morphology, initial encounter; Z98.890, Z87.81 (ICD-10-CM) - S/P ORIF (open reduction internal fixation) fracture      DOS: 22       OPEN REDUCTION INTERNAL FIXATION PROXIMAL HUMERUS, RIGHT   Treating Diagnosis: dec ROM in R UE, weakness in R UE, pain in R shoulder     Insurance/Certification information:  PT Insurance Information: [facility]  Physician Information:   Dr. Jose Lund  Has the plan of care been signed (Y/N):        [x]  Yes  []  No     Date of Patient follow up with Physician: PRN      Is this a Progress Report:     []  Yes  [x]  No        If Yes:  Date Range for reporting period:  Beginnin22  Ending:     Progress report will be due (10 Rx or 30 days whichever is less):       Recertification will be due (POC Duration  / 90 days whichever is less): 22        Visit # Insurance Allowable Auth Required   36 60 []  Yes [x]  No        Functional Scale:    OUTCOME MEASURE DATE SCORE   FOTO Shoulder 22 37   FOTO Shoulder 22 55   FOTO Shoulder 22 64   FOTO Shoulder 22 65   FOTO Shoulder 22 70   FOTO Shoulder 22 70   FOTO Shoulder 22 76   FOTO Shoulder 22 71   FOTO Shoulder 10/21/22 76           Latex Allergy:  [x]NO      []YES  Preferred Language for Healthcare:   [x]English       []other:     Pain level:  0/10     SUBJECTIVE:  5.5 months PO. Doing well today. No new concerns. Day to day is feeling better. Saw MD this week and is progressing well and expect it to \"just take time\".        OBJECTIVE:     10/21/22  ROM PROM AROM  Comment     L R    L R Flexion   160° with OH cane 150° 130° with shrug  (unchanged from last month)     Abduction  125° 165° 120° with shrug  (unchanged from last month)     ER @ 90 87 80°cold T4 C7 (unchanged from last month)     IR (GH) @ 90 45 25°  35° full IR T6 L4 (was L2 last month)        10/21/22  Strength L R Comment   Flexion 5 5    Abduction 5 5    ER 5 5     IR 5 5    Supraspinatus 5 5    Champagne Toast   5     Lower Trap         Mid Trap         Rhomboids         Biceps         Triceps         Horizontal Abduction         Horizontal Adduction         Lats             Occupation/School: ; [employer] and walking;  Living Status/Prior Level of Function: Independent with ADLs and IADLs, unlimited prior to injury in R shoulder;    RESTRICTIONS/PRECAUTIONS: Prox humeral fracture on 5/3/22 w surg on 5/6/22- Aggressive PROM; begin strengthening ~6/29/22  Exercises/Interventions:   Exercise/Equipment Resistance/Repetitions Comments   Cardio/Warm-up     Bike          Stretching/PROM     HEP      IR behind back  3x30\" Belt up the back At wall to block shoulder  from tipping forward   Sleeper Stretch 3x30\"    Cross Body Adduction 3x30\" supine      Wall slides 10x10\" Flexion, scaption       Pec Stretch Doorway 3x30\"          Wrist flex/ext           STRENGTH     Isometrics     Retraction          Weight shift     HEP   HEP         Biceps     Triceps          PRE's           Internal Rotation     I     EXT        Reverse Flys        Prone row + ER                 Triceps                Cable Column/Theraband     External Rotation 3x10; Green    Internal Rotation 3x10; Silver    Abduction at wall 10x10\" holds;  Blue  Shoulder blade squeeze, lift arms to about 45 deg abduction and hold   Lats     HEP   Flex     HEP   BIC     HEP   PNF 3x10; Red D2 Supine        Neuromuscular Re-ed     Dynamic Stability           Wall Clock 2 rounds each arm: 3/2/1/12 o'clock Green loop at wrists                       Manual/Modalities Therapeutic Exercise and NMR EXR  [x] (97861) Provided verbal/tactile cueing for activities related to strengthening, flexibility, endurance, ROM  for improvements in scapular, scapulothoracic and UE control with self care, reaching, carrying, lifting, house/yardwork, driving/computer work.    [] (41575) Provided verbal/tactile cueing for activities related to improving balance, coordination, kinesthetic sense, posture, motor skill, proprioception  to assist with  scapular, scapulothoracic and UE control with self care, reaching, carrying, lifting, house/yardwork, driving/computer work. Therapeutic Activities:    [x] (58478 or 39640) Provided verbal/tactile cueing for activities related to improving balance, coordination, kinesthetic sense, posture, motor skill, proprioception and motor activation to allow for proper function of scapular, scapulothoracic and UE control with self care, carrying, lifting, driving/computer work. Home Exercise Program:    [x] (13273) Reviewed/Progressed HEP activities related to strengthening, flexibility, endurance, ROM of scapular, scapulothoracic and UE control with self care, reaching, carrying, lifting, house/yardwork, driving/computer work     Written HEP est  Access Code: [email or web address]  URL: [email or web address]  Date: 10/28/2022  Prepared by:  Sundar Solorio    Exercises  Cross Body Shoulder Stretch at [place] - 3 x daily - 7 x weekly - 5 reps - 30 hold  Sleeper Stretch - 3 x daily - 7 x weekly - 5 sets - 30\" hold  Standing Shoulder Internal Rotation Stretch with Towel - 3 x daily - 7 x weekly - 5 reps - 30 hold  Supine Shoulder Internal Rotation Stretch, Hand Behind Back - 3 x daily - 7 x weekly - 5 reps - 10-30 hold  Doorway Pec Stretch at 90 Degrees Abduction - 2 x daily - 7 x weekly - 3 sets - 30\" hold  Shoulder Flexion Wall Slide with Towel - 2 x daily - 7 x weekly - 10 reps - 10 hold  Scaption Wall Slide with Towel - 2 x daily - 7 x weekly - 10 sets - 10\" hold  Supine Shoulder Flexion Extension AAROM with Dowel - 2 x daily - 7 x weekly - 10 reps - 10 hold  Standing Shoulder Single Arm PNF D2 Flexion with Resistance - 1 x daily - 2-3 x weekly - 3 sets - 10 reps  Standing Shoulder Internal Rotation with Anchored Resistance - 1 x daily - 2-3 x weekly - 3 sets - 10 reps  Shoulder External Rotation with Anchored Resistance - 1 x daily - 2-3 x weekly - 3 sets - 10 reps  Scapular Retraction with Resistance - 1 x daily - 2-3 x weekly - 3 sets - 10 reps  Shoulder Extension with Resistance - 1 x daily - 2-3 x weekly - 3 sets - 10 reps  Standing Wall [employer] with Mini Swiss Ball - 1 x daily - 2-3 x weekly - 3 sets - 10 reps  Standing Wall Ball Circles in Scaption with Mini Swiss Ball - 1 x daily - 2-3 x weekly - 3 sets - 10 reps  Wall Push Up with Plus - 1 x daily - 2-3 x weekly - 3 sets - 10 reps  Standing Shoulder Abduction with Resistance - 1 x daily - 2-3 x weekly - 1-2 sets - 10 reps - 10 hold  Wall Clock with Theraband - 1 x daily - 2-3 x weekly - 2-3 sets - 4 reps            [x] (01215) Reviewed/Progressed HEP activities related to improving balance, coordination, kinesthetic sense, posture, motor skill, proprioception of scapular, scapulothoracic and UE control with self care, reaching, carrying, lifting, house/yardwork, driving/computer work      Manual Treatments:  PROM / STM / Oscillations-Mobs:  G-I, II, III, IV (PA's, Inf., Post.)  [x] (48970) Provided manual therapy to mobilize soft tissue/joints of cervical/CT, scapular GHJ and UE for the purpose of modulating pain, promoting relaxation,  increasing ROM, reducing/eliminating soft tissue swelling/inflammation/restriction, improving soft tissue extensibility and allowing for proper ROM for normal function with self care, reaching, carrying, lifting, house/yardwork, driving/computer work      Modalities: Declined     [] GAME READY (VASO)- for significant edema, swelling, pain control.        Charges:  Timed Code Treatment Minutes: 39'   Total Treatment Minutes: 39'   850-940    [] EVAL (LOW) 455 1011 (typically 20 minutes face-to-face)  [] EVAL (MOD) 00434 (typically 30 minutes face-to-face)  [] EVAL (HIGH) 38014 (typically 45 minutes face-to-face)  [] RE-EVAL   [x] ZI(45336) x 1  [] IONTO  [x] NMR (87610) x 1   [] VASO  [] Manual (70168) x    [] Other:  [x] TA (41687) x 1   [] Mech Traction (46603)  [] ES(attended) (51742)      [] ES (un) (52396):     GOALS:  Patient stated goal: regain full strength and function in R shoulder  [x] Progressing: [] Met: [] Not Met: [] Adjusted    Therapist goals for Patient:   Short Term Goals: To be achieved in: 2 weeks  1. Independent in HEP and progression per patient tolerance, in order to prevent re-injury. [] Progressing: [x] Met: [] Not Met: [] Adjusted  2. Patient will have a decrease in pain to facilitate improvement in movement, function, and ADLs as indicated by Functional Deficits. [] Progressing: [x] Met: [] Not Met: [] Adjusted    Long Term Goals: To be achieved in: 12 weeks  1. Disability index score of 67 or more for the FOTO to assist with reaching prior level of function. [] Progressing: [x] Met: [] Not Met: [] Adjusted  2. Patient will demonstrate increased AROM to 170+ elevation, 90 ER, and reach mid back to allow for proper joint functioning as indicated by patients Functional Deficits. [] Progressing: [] Met: [x] Not Met: [] Adjusted  3. Patient will demonstrate an increase in Strength to 5/5 to allow for proper functional mobility as indicated by patients Functional Deficits. [] Progressing: [x] Met: [] Not Met: [] Adjusted  4. Patient will return to 90% functional activities without increased symptoms or restriction. [] Progressing: [x] Met: [] Not Met: [] Adjusted  5.  Pt will be able to return to playing siddhartha ball w/o restrictions.(patient specific functional goal)    [] Progressing: [] Met: [x] Not Met: [] Adjusted    Overall Progression Towards Functional goals/ Treatment Progress Update:  [] Patient is progressing as expected towards functional goals listed. [x] Progression is slowed due to complexities/Impairments listed. - joint stiffness, possible RC dysfunction  [] Progression has been slowed due to co-morbidities. [] Plan just implemented, too soon to assess goals progression <30days   [] Goals require adjustment due to lack of progress  [] Patient is not progressing as expected and requires additional follow up with physician  [] Other    ASSESSMENT:      Treatment/Activity Tolerance:  [x] Patient tolerated treatment well [] Patient limited by fatique  [] Patient limited by pain  [] Patient limited by other medical complications  [x] Other: Tolerated visit well. Focused on updating HEP with emphasis on band work as patient has an easier time using bands at home versus dumbbells. He has good confidence and understanding of HEP. He will work independently for 3 weeks and return for progress note and possible discharge at that time. He is in agreement with this plan. Prognosis: [x] Good [] Fair  [] Poor    Patient Requires Follow-up: [x] Yes  [] No    PLAN: Hold pending MD visit 10/21/22  [] Continue per plan of care [] Alter current plan (see comments)  [] Plan of care initiated [x] Hold pending MD visit [] Discharge      Electronically signed by: Jono Tejeda, PT, DPT, OCS    Note: If patient does not return for scheduled/ recommended follow up visits, this note will serve as a discharge from care along with most recent update on progress.

## 2022-10-31 ENCOUNTER — APPOINTMENT (OUTPATIENT)
Dept: PHYSICAL THERAPY | Age: 57
End: 2022-10-31
Payer: COMMERCIAL

## 2022-11-18 ENCOUNTER — HOSPITAL ENCOUNTER (OUTPATIENT)
Dept: PHYSICAL THERAPY | Age: 57
Setting detail: THERAPIES SERIES
Discharge: HOME | End: 2022-11-18
Payer: COMMERCIAL

## 2022-11-18 PROCEDURE — 97110 THERAPEUTIC EXERCISES: CPT

## 2022-11-18 PROCEDURE — 97530 THERAPEUTIC ACTIVITIES: CPT

## 2022-11-18 NOTE — PLAN OF CARE
[facility] [address]          Physical Therapy Discharge Summary    Dear  Dr. Rio Fletcher,    We had the pleasure of treating the following patient for physical therapy services at [address] [place] [address]. A summary of our findings can be found in the discharge summary below. If you have any questions or concerns regarding these findings, please do not hesitate to contact me at the office phone number above. Thank you for the referral.     Physician Signature:________________________________Date:__________________  By signing above (or electronic signature), therapists plan is approved by physician      Overall Response to Treatment:   []Patient is responding well to treatment and improvement is noted with regards  to goals   []Patient should continue to improve in reasonable time if they continue HEP   []Patient has plateaued and is no longer responding to skilled PT intervention    []Patient is getting worse and would benefit from return to referring MD   []Patient unable to adhere to initial POC   [x]Other: Progressing well to date. Has made improvement in all planes of ROM actively and passively since last follow up in October. Strength remains 5 out of 5 in all positions without pain. Functionally he is independent and reporting greater ease with OH positions / activities. Patient has demonstrated a consistent understanding of their HEP and how to progress themself independently. Patient is to be discharged from PT as skilled care is no longer needed. Patient is instructed to call PT with any questions or concerns moving forward. Patient is agreeable with this plan.       Date range of Visits: 22-22      Total Visits: 37      Physical Therapy Daily Treatment Note      Date:  2022    Patient Name:  Charity Villarreal    :  1965  MRN: [id number]  Restrictions/Precautions: prox humeral fx 22  Medical/Treatment Diagnosis Information:  Diagnosis: S42.201A (ICD-10-CM) - Closed fracture of proximal end of right humerus, unspecified fracture morphology, initial encounter; Z98.890, Z87.81 (ICD-10-CM) - S/P ORIF (open reduction internal fixation) fracture      DOS: 22       OPEN REDUCTION INTERNAL FIXATION PROXIMAL HUMERUS, RIGHT   Treating Diagnosis: dec ROM in R UE, weakness in R UE, pain in R shoulder     Insurance/Certification information:  PT Insurance Information: [employer]  Physician Information:   Dr. Wiley Barnhart  Has the plan of care been signed (Y/N):        [x]  Yes  []  No     Date of Patient follow up with Physician: PRN      Is this a Progress Report:     [x]  Yes  []  No        If Yes:  Date Range for reporting period:  Beginnin22  Endin22    Progress report will be due (10 Rx or 30 days whichever is less): N/A Discharge      Recertification will be due (POC Duration  / 90 days whichever is less): N/A Discharge        Visit # Insurance Allowable Auth Required   37 60 []  Yes [x]  No        Functional Scale:    OUTCOME MEASURE DATE SCORE   FOTO Shoulder 22 37   FOTO Shoulder 22 55   FOTO Shoulder 22 64   FOTO Shoulder 22 65   FOTO Shoulder 22 70   FOTO Shoulder 22 70   FOTO Shoulder 22 76   FOTO Shoulder 22 71   FOTO Shoulder 10/21/22 76   FOTO Shoulder 22 75           Latex Allergy:  [x]NO      []YES  Preferred Language for Healthcare:   [x]English       []other:     Pain level:  0/10     SUBJECTIVE:  6.5 months PO. Doing well today. Shoulder still feels good. Strength wise he feels great, motion is okay but not huge changes. Does feel it is slowly progressing. No concerns or setbacks. Reports he feels ready to transition to fully independent HEP.       OBJECTIVE:     22  ROM PROM AROM  Comment     L R    L R    Flexion   145° with shrug     Abduction  130° with shrug     ER @ 90 87 85°  T1     IR (GH) @ 90 45 38°  51° full IR L1        22  Strength L R Comment   Flexion 5 5    Abduction 5 5    ER 5 5     IR 5 5    Supraspinatus 5 5    Champagne Toast   5     Lower Trap         Mid Trap         Rhomboids         Biceps         Triceps         Horizontal Abduction         Horizontal Adduction         Lats             Occupation/School: ; [employer] and walking;  Living Status/Prior Level of Function: Independent with ADLs and IADLs, unlimited prior to injury in R shoulder;    RESTRICTIONS/PRECAUTIONS: Prox humeral fracture on 5/3/22 w surg on 5/6/22- Aggressive PROM; begin strengthening ~6/29/22  Exercises/Interventions:   Exercise/Equipment Resistance/Repetitions Comments   Cardio/Warm-up     Bike          Stretching/PROM     HEP         Wall slides 10x10\" Flexion, scaption                Wrist flex/ext           STRENGTH     Isometrics     Retraction          Weight shift     HEP   HEP         Biceps     Triceps          PRE's           Internal Rotation     I     EXT        Reverse Flys        Prone row + ER           Serratus foam roll up wall 3x10 with green loop around wrists Pillow case      Triceps                Cable Column/Theraband     External Rotation 3x10; Red at 90/90    Internal Rotation 3x10; Red at 90/90    Lats     HEP   Flex     HEP   BIC     HEP        Neuromuscular Re-ed     Dynamic Stability           Wall Clock 2 rounds each arm: 3/2/1/12 o'clock Green loop at wrists                       Manual/Modalities        Therapeutic Exercise and NMR EXR  [x] (28975) Provided verbal/tactile cueing for activities related to strengthening, flexibility, endurance, ROM  for improvements in scapular, scapulothoracic and UE control with self care, reaching, carrying, lifting, house/yardwork, driving/computer work.    [] (90856) Provided verbal/tactile cueing for activities related to improving balance, coordination, kinesthetic sense, posture, motor skill, proprioception  to assist with  scapular, scapulothoracic and UE control with self care, reaching, carrying, lifting, house/yardwork, driving/computer work. Therapeutic Activities:    [x] (09045 or 93092) Provided verbal/tactile cueing for activities related to improving balance, coordination, kinesthetic sense, posture, motor skill, proprioception and motor activation to allow for proper function of scapular, scapulothoracic and UE control with self care, carrying, lifting, driving/computer work. Home Exercise Program:    [x] (91467) Reviewed/Progressed HEP activities related to strengthening, flexibility, endurance, ROM of scapular, scapulothoracic and UE control with self care, reaching, carrying, lifting, house/yardwork, driving/computer work     Written HEP est  Access Code: [email or web address]  URL: [email or web address]  Date: 11/18/2022  Prepared by:  Sundar Solorio    Exercises  Cross Body Shoulder Stretch at [address] - 3 x daily - 7 x weekly - 5 reps - 30 hold  Sleeper Stretch - 3 x daily - 7 x weekly - 5 sets - 30\" hold  Standing Shoulder Internal Rotation Stretch with Towel - 3 x daily - 7 x weekly - 5 reps - 30 hold  Supine Shoulder Internal Rotation Stretch, Hand Behind Back - 3 x daily - 7 x weekly - 5 reps - 10-30 hold  Doorway Pec Stretch at 90 Degrees Abduction - 2 x daily - 7 x weekly - 3 sets - 30\" hold  Shoulder Flexion Wall Slide with Towel - 2 x daily - 7 x weekly - 10 reps - 10 hold  Scaption Wall Slide with Towel - 2 x daily - 7 x weekly - 10 sets - 10\" hold  Supine Shoulder Flexion Extension AAROM with Dowel - 2 x daily - 7 x weekly - 10 reps - 10 hold  Standing Shoulder Single Arm PNF D2 Flexion with Resistance - 1 x daily - 2-3 x weekly - 3 sets - 10 reps  Standing Shoulder Internal Rotation with Anchored Resistance - 1 x daily - 2-3 x weekly - 3 sets - 10 reps  Shoulder External Rotation with Anchored Resistance - 1 x daily - 2-3 x weekly - 3 sets - 10 reps  Scapular Retraction with Resistance - 1 x daily - 2-3 x weekly - 3 sets - 10 reps  Shoulder Extension with Resistance - 1 x daily - 2-3 x weekly - 3 sets - 10 reps  Standing Wall [employer] with Mini Swiss Ball - 1 x daily - 2-3 x weekly - 3 sets - 10 reps  Standing Wall Ball Circles in Scaption with Mini Swiss Ball - 1 x daily - 2-3 x weekly - 3 sets - 10 reps  Wall Push Up with Plus - 1 x daily - 2-3 x weekly - 3 sets - 10 reps  Standing Shoulder Abduction with Resistance - 1 x daily - 2-3 x weekly - 1-2 sets - 10 reps - 10 hold  Wall Clock with Theraband - 1 x daily - 2-3 x weekly - 2-3 sets - 4 reps  Serratus Activation at Wall with Foam Roll and Resistance Band - 1 x daily - 2-3 x weekly - 3 sets - 10 reps  Standing Single Arm Shoulder External Rotation in Abduction with Anchored Resistance - 1 x daily - 2-3 x weekly - 3 sets - 10 reps  Standing Single Arm Shoulder Internal Rotation in Abduction with Anchored Resistance - 1 x daily - 2-3 x weekly - 3 sets - 10 reps          [x] (77543) Reviewed/Progressed HEP activities related to improving balance, coordination, kinesthetic sense, posture, motor skill, proprioception of scapular, scapulothoracic and UE control with self care, reaching, carrying, lifting, house/yardwork, driving/computer work      Manual Treatments:  PROM / STM / Oscillations-Mobs:  G-I, II, III, IV (PA's, Inf., Post.)  [x] (36209) Provided manual therapy to mobilize soft tissue/joints of cervical/CT, scapular GHJ and UE for the purpose of modulating pain, promoting relaxation,  increasing ROM, reducing/eliminating soft tissue swelling/inflammation/restriction, improving soft tissue extensibility and allowing for proper ROM for normal function with self care, reaching, carrying, lifting, house/yardwork, driving/computer work      Modalities: Declined     [] GAME READY (VASO)- for significant edema, swelling, pain control.        Charges:  Timed Code Treatment Minutes: 30'   Total Treatment Minutes: 30'   850-920    [] EVAL (LOW) 455 2649 (typically 20 minutes face-to-face)  [] EVAL (MOD) 40316 (typically 30 minutes face-to-face)  [] EVAL (HIGH) 12481 (typically 45 minutes face-to-face)  [] RE-EVAL   [x] PZ(53124) x 1  [] IONTO  [] NMR (85790) x    [] VASO  [] Manual (46129) x    [] Other:  [x] TA (49704) x 1   [] Mech Traction (13750)  [] ES(attended) (41244)      [] ES (un) (08671):     GOALS:  Patient stated goal: regain full strength and function in R shoulder  [] Progressing: [x] Met: [] Not Met: [] Adjusted    Therapist goals for Patient:   Short Term Goals: To be achieved in: 2 weeks  1. Independent in HEP and progression per patient tolerance, in order to prevent re-injury. [] Progressing: [x] Met: [] Not Met: [] Adjusted  2. Patient will have a decrease in pain to facilitate improvement in movement, function, and ADLs as indicated by Functional Deficits. [] Progressing: [x] Met: [] Not Met: [] Adjusted    Long Term Goals: To be achieved in: 12 weeks  1. Disability index score of 67 or more for the FOTO to assist with reaching prior level of function. [] Progressing: [x] Met: [] Not Met: [] Adjusted  2. Patient will demonstrate increased AROM to 170+ elevation, 90 ER, and reach mid back to allow for proper joint functioning as indicated by patients Functional Deficits. [x] Progressing: [] Met: [] Not Met: [] Adjusted  3. Patient will demonstrate an increase in Strength to 5/5 to allow for proper functional mobility as indicated by patients Functional Deficits. [] Progressing: [x] Met: [] Not Met: [] Adjusted  4. Patient will return to 90% functional activities without increased symptoms or restriction. [] Progressing: [x] Met: [] Not Met: [] Adjusted  5. Pt will be able to return to playing siddhartha ball w/o restrictions.(patient specific functional goal)    [x] Progressing: [] Met: [] Not Met: [] Adjusted    Overall Progression Towards Functional goals/ Treatment Progress Update:  [] Patient is progressing as expected towards functional goals listed.     [x] Progression is slowed due to complexities/Impairments listed. - joint stiffness, possible RC dysfunction  [] Progression has been slowed due to co-morbidities. [] Plan just implemented, too soon to assess goals progression <30days   [] Goals require adjustment due to lack of progress  [] Patient is not progressing as expected and requires additional follow up with physician  [] Other    ASSESSMENT:      Treatment/Activity Tolerance:  [x] Patient tolerated treatment well [] Patient limited by fatique  [] Patient limited by pain  [] Patient limited by other medical complications  [x] Other: See above. Prognosis: [x] Good [] Fair  [] Poor    Patient Requires Follow-up: [] Yes  [x] No    PLAN: Discharge to [facility] 11/18/22  [] Continue per plan of care [] Alter current plan (see comments)  [] Plan of care initiated [] Hold pending MD visit [x] Discharge      Electronically signed by: Rosemary Mercer, PT, DPT, OCS    Note: If patient does not return for scheduled/ recommended follow up visits, this note will serve as a discharge from care along with most recent update on progress.

## 2022-11-28 ENCOUNTER — HOSPITAL ENCOUNTER (OUTPATIENT)
Dept: CT IMAGING | Age: 57
Discharge: HOME | End: 2022-11-28
Payer: COMMERCIAL

## 2022-11-28 DIAGNOSIS — R91.1 PULMONARY NODULE: ICD-10-CM

## 2022-11-28 PROCEDURE — 71250 CT THORAX DX C-: CPT

## (undated) DEVICE — SUTURE VCRL + SZ 2-0 L27IN ABSRB CLR CT-1 1/2 CIR TAPERCUT VCP259H

## (undated) DEVICE — SPONGE GZ W4XL4IN COT 12 PLY TYP VII WVN C FLD DSGN

## (undated) DEVICE — HYPODERMIC SAFETY NEEDLE: Brand: MAGELLAN

## (undated) DEVICE — STOCKINETTE,IMPERVIOUS,12X48,STERILE: Brand: MEDLINE

## (undated) DEVICE — SUTURE MCRYL + SZ 4-0 L18IN ABSRB UD L19MM PS-2 3/8 CIR MCP496G

## (undated) DEVICE — HANDPIECE SET WITH HIGH FLOW TIP AND SUCTION TUBE: Brand: INTERPULSE

## (undated) DEVICE — GOWN,SIRUS,POLYRNF,BRTHSLV,XL,30/CS: Brand: MEDLINE

## (undated) DEVICE — C-ARM: Brand: UNBRANDED

## (undated) DEVICE — Z INACTIVE USE 2660664 SOLUTION IRRIG 3000ML 0.9% SOD CHL USP UROMATIC PLAS CONT

## (undated) DEVICE — SUTURE STRATAFIX SPRL SZ 3-0 L12IN ABSRB UD FS-1 L30X30CM SXMP2B410

## (undated) DEVICE — TOTAL BASIC: Brand: MEDLINE INDUSTRIES, INC.

## (undated) DEVICE — DRAPE,U/SHT,SPLIT,FILM,60X84,STERILE: Brand: MEDLINE

## (undated) DEVICE — DRAPE,REIN 53X77,STERILE: Brand: MEDLINE

## (undated) DEVICE — INTENDED TO SUPPORT AND MAINTAIN THE POSITION OF AN ANESTHETIZED PATIENT DURING SURGERY: Brand: ERIN BEACH CHAIR FACE MASK

## (undated) DEVICE — SPONGE LAP W18XL18IN WHT COT 4 PLY FLD STRUNG RADPQ DISP ST

## (undated) DEVICE — BIT DRL DIA2.7MM CANN QUIK CPL

## (undated) DEVICE — GLOVE SURG SZ 85 L12IN FNGR THK94MIL STD WHT LTX FREE

## (undated) DEVICE — 3M™ STERI-DRAPE™ INSTRUMENT POUCH 1018: Brand: STERI-DRAPE™

## (undated) DEVICE — GLOVE ORANGE PI 8 1/2   MSG9085

## (undated) DEVICE — 3M™ COBAN™ NL STERILE NON-LATEX SELF-ADHERENT WRAP, 2086S, 6 IN X 5 YD (15 CM X 4,5 M), 12 ROLLS/CASE: Brand: 3M™ COBAN™

## (undated) DEVICE — BIT DRL L100MM DIA2.5MM FOR SM FRAG UNIV LOK SYS

## (undated) DEVICE — SUTURE VCRL + 1 L27IN ABSRB UD CT-1 L36MM 1/2 CIR TAPR PNT VCP261H

## (undated) DEVICE — DRAPE,SPLIT ,77X120: Brand: MEDLINE

## (undated) DEVICE — SYRINGE MED 10ML LUERLOCK TIP W/O SFTY DISP

## (undated) DEVICE — MAJOR SET UP: Brand: MEDLINE INDUSTRIES, INC.

## (undated) DEVICE — 1010 S-DRAPE TOWEL DRAPE 10/BX: Brand: STERI-DRAPE™

## (undated) DEVICE — IMPLANTABLE DEVICE: Type: IMPLANTABLE DEVICE | Site: ARM | Status: NON-FUNCTIONAL

## (undated) DEVICE — SOLUTION IV IRRIG POUR BRL 0.9% SODIUM CHL 2F7124

## (undated) DEVICE — 3M™ STERI-DRAPE™ U-DRAPE 1015: Brand: STERI-DRAPE™

## (undated) DEVICE — 4-PORT MANIFOLD: Brand: NEPTUNE 2

## (undated) DEVICE — BIT DRL L205MM DIA2.7MM STD QUIK CONN W/O STP N RADLUC